# Patient Record
Sex: MALE | Race: WHITE | NOT HISPANIC OR LATINO | URBAN - METROPOLITAN AREA
[De-identification: names, ages, dates, MRNs, and addresses within clinical notes are randomized per-mention and may not be internally consistent; named-entity substitution may affect disease eponyms.]

---

## 2017-07-06 ENCOUNTER — INPATIENT (INPATIENT)
Facility: HOSPITAL | Age: 67
LOS: 1 days | Discharge: HOME | End: 2017-07-08
Attending: HOSPITALIST

## 2017-07-06 DIAGNOSIS — R56.9 UNSPECIFIED CONVULSIONS: ICD-10-CM

## 2017-07-11 DIAGNOSIS — F17.210 NICOTINE DEPENDENCE, CIGARETTES, UNCOMPLICATED: ICD-10-CM

## 2017-07-11 DIAGNOSIS — N40.0 BENIGN PROSTATIC HYPERPLASIA WITHOUT LOWER URINARY TRACT SYMPTOMS: ICD-10-CM

## 2017-07-11 DIAGNOSIS — G47.33 OBSTRUCTIVE SLEEP APNEA (ADULT) (PEDIATRIC): ICD-10-CM

## 2017-07-11 DIAGNOSIS — I10 ESSENTIAL (PRIMARY) HYPERTENSION: ICD-10-CM

## 2017-07-11 DIAGNOSIS — E11.9 TYPE 2 DIABETES MELLITUS WITHOUT COMPLICATIONS: ICD-10-CM

## 2017-07-11 DIAGNOSIS — Z86.73 PERSONAL HISTORY OF TRANSIENT ISCHEMIC ATTACK (TIA), AND CEREBRAL INFARCTION WITHOUT RESIDUAL DEFICITS: ICD-10-CM

## 2017-07-11 DIAGNOSIS — G40.909 EPILEPSY, UNSPECIFIED, NOT INTRACTABLE, WITHOUT STATUS EPILEPTICUS: ICD-10-CM

## 2017-07-11 DIAGNOSIS — Z79.84 LONG TERM (CURRENT) USE OF ORAL HYPOGLYCEMIC DRUGS: ICD-10-CM

## 2017-07-11 DIAGNOSIS — I47.1 SUPRAVENTRICULAR TACHYCARDIA: ICD-10-CM

## 2017-07-11 DIAGNOSIS — Z90.49 ACQUIRED ABSENCE OF OTHER SPECIFIED PARTS OF DIGESTIVE TRACT: ICD-10-CM

## 2019-01-22 NOTE — ASU PATIENT PROFILE, ADULT - PMH
Anxiety    Cerebrovascular accident (CVA), unspecified mechanism    Hypertension, unspecified type    Neuropathy    Nonintractable epilepsy without status epilepticus, unspecified epilepsy type    Type 2 diabetes mellitus with complication, without long-term current use of insulin

## 2019-01-23 ENCOUNTER — OUTPATIENT (OUTPATIENT)
Dept: OUTPATIENT SERVICES | Facility: HOSPITAL | Age: 69
LOS: 1 days | Discharge: HOME | End: 2019-01-23

## 2019-01-23 VITALS
OXYGEN SATURATION: 97 % | SYSTOLIC BLOOD PRESSURE: 174 MMHG | DIASTOLIC BLOOD PRESSURE: 87 MMHG | HEIGHT: 66 IN | WEIGHT: 242.95 LBS | TEMPERATURE: 96 F | RESPIRATION RATE: 17 BRPM | HEART RATE: 52 BPM

## 2019-01-23 VITALS — HEART RATE: 65 BPM | RESPIRATION RATE: 18 BRPM | DIASTOLIC BLOOD PRESSURE: 86 MMHG | SYSTOLIC BLOOD PRESSURE: 162 MMHG

## 2019-01-23 DIAGNOSIS — Z98.890 OTHER SPECIFIED POSTPROCEDURAL STATES: Chronic | ICD-10-CM

## 2019-01-23 LAB — GLUCOSE BLDC GLUCOMTR-MCNC: 122 MG/DL — HIGH (ref 70–99)

## 2019-01-23 NOTE — ASU PREOP CHECKLIST - ANTIBIOTIC
After Visit Summary   8/8/2018    Iris Lewis    MRN: [id number]           Patient Information     Date Of Birth          1940        Visit Information        Provider Department      8/8/2018 10:30 AM  24 ATC;  ONCOLOGY INFUSION [facility]        Today's Diagnoses     Malignant neoplasm of head of pancreas (H)    -  1      Care Instructions    Contact Numbers  [facility]: [phone]    After Hours:  [phone]  Triage: [phone]     Please call the [facility] Triage line if you experience a temperature greater than or equal to 100.5, shaking chills, have uncontrolled nausea, vomiting and/or diarrhea, dizziness, shortness of breath, chest pain, bleeding, unexplained bruising, or if you have any other new/concerning symptoms, questions or concerns.      If it is after hours, weekends, or holidays, please call the [phone] to speak to a nurse.      If you are having any concerning symptoms or wish to speak to a provider before your next infusion visit, please call your care coordinator or triage to notify them so we can adequately serve you.      If you need a refill on a narcotic prescription or other medication, please call triage before your infusion appointment.               August 2018 Sunday Monday Tuesday Wednesday Thursday Friday Saturday                  1     2     3     4       5     6     [facility] LAB DRAW    8:30 AM   (15 min.)   [facility] DRAW   [facility] Lab Draw     [facility] ONC INFUSION 180    9:00 AM   (180 min.)    ONCOLOGY INFUSION   [facility] 7     8     [facility] RETURN    9:15 AM   (30 min.)   José Antonio Ann MD   [facility] ONC INFUSION 60   10:30 AM   (60 min.)    ONCOLOGY INFUSION   [facility] 9     10     11       12     13     14     15     16     17     18       19     20     [facility] DRAW   10:00 AM   (15 min.)   [facility] LAB  DRAW   [facility] Lab Draw     P ONC INFUSION 180   10:30 AM   (180 min.)   UC ONCOLOGY INFUSION   [facility] 21     22     23     24     CT CHEST ABDOMEN PELVIS WWO    9:40 AM   (20 min.)   UCCT2   [facility] CT 25       26     27     [facility] DRAW   10:15 AM   (15 min.)   [facility] DRAW   [facility] Lab Draw     [facility] RETURN   10:30 AM   (30 min.)   Vinny Yu MD   [facility]     [facility] ONC INFUSION 180   11:30 AM   (180 min.)   UC ONCOLOGY INFUSION   [facility] 28     29     30     31 September 2018 Sunday Monday Tuesday Wednesday Thursday Friday Saturday                                 1       2     3     4     5     6     7     8       9     10     11     12     13     14     15       16     17     18     19     20     21     22       23     24     25     26     27     28     29       30                                                Lab Results:  No results found for this or any previous visit (from the past 12 hour(s)).            Follow-ups after your visit        Your next 10 appointments already scheduled     Aug 20, 2018 10:00 AM CDT   [facility] Lab Draw with  [facility] DRAW   [facility] ([facility]    [address]  [address]  [place] [address] [phone]            Aug 20, 2018 10:30 AM CDT   Infusion 180 with UC ONCOLOGY INFUSION, UC 22 ATC   [facility] ([facility])    [address]  [address]  [place] [address] [phone]            Aug 24, 2018  9:40 AM CDT   CT CHEST ABDOMEN PELVIS W/O & W CONTRAST with UCCT2   [facility] CT ([facility])    [address]  1st Floor  [place] [address] [phone]           Please bring any scans or X-rays taken at other hospitals, if similar tests were done.  Also bring a list of your medicines, including vitamins, minerals and over-the-counter drugs. It is safest to leave personal items at home.  Be sure to tell your doctor:   If you have any allergies.   If there s any chance you are pregnant.   If you are breastfeeding.  How to prepare:   Do not eat or drink for 2 hours before your exam. If you need to take medicine, you may take it with small sips of water. (We may ask you to take liquid medicine as well.)   Please wear loose clothing, such as a sweat suit or jogging clothes. Avoid snaps, zippers and other metal. We may ask you to undress and put on a hospital gown.  Please arrive 30 minutes early for your CT. Once in the department you might be asked to drink water 15-20 minutes prior to your exam.  If indicated you may be asked to drink an oral contrast in advance of your CT.  If this is the case, the imaging team will let you know or be in contact with you prior to your appointment  Patients over 70 or patients with diabetes or kidney problems:   If you haven t had a blood test (creatinine test) within the last 30 days, the Cardiologist/Radiologist may require you to get this test prior to your exam.  If you have diabetes:   Continue to take your metformin medication on the day of your exam  If you have any questions, please call the Imaging Department where you will have your exam.            Aug 27, 2018 10:15 AM CDT   [facility] Lab Draw with [facility] LAB DRAW   [facility] Lab Draw ([facility])    [address]  [address]  [place] [address]   [phone]            Aug 27, 2018 10:45 AM CDT   (Arrive by 10:30 AM)   Return Visit with Vinny Yu MD   [facility] ([facility])    [address]  [address]  [place] [address]   [phone]            Aug 27, 2018 11:30 AM CDT   Infusion 180 with  ONCOLOGY INFUSION,  31 ATC   [facility]  Clinic ([facility])    [address]  [address]  [place] [address] [phone]              Who to contact     If you have questions or need follow up information about today's clinic visit or your schedule please contact [facility] directly at [phone].  Normal or non-critical lab and imaging results will be communicated to you by MyChart, letter or phone within 4 business days after the clinic has received the results. If you do not hear from us within 7 days, please contact the clinic through [employer] or phone. If you have a critical or abnormal lab result, we will notify you by phone as soon as possible.  Submit refill requests through [employer] or call your pharmacy and they will forward the refill request to us. Please allow 3 business days for your refill to be completed.          Additional Information About Your Visit        [employer] Information     [employer] gives you secure access to your electronic health record. If you see a primary care provider, you can also send messages to your care team and make appointments. If you have questions, please call your primary care clinic.  If you do not have a primary care provider, please call [phone] and they will assist you.        Care EveryWhere ID     This is your Care EveryWhere ID. This could be used by other organizations to access your [place] medical records  [id number]         Blood Pressure from Last 3 Encounters:   08/08/18 118/68   08/06/18 121/74   07/26/18 120/62    Weight from Last 3 Encounters:   08/08/18 45.9 kg (101 lb 3 oz)   08/06/18 44.3 kg (97 lb 9.6 oz)   07/24/18 43.6 kg (96 lb 1.6 oz)              Today, you had the following     No orders found for display       Primary Care Provider Office Phone # Fax #    Neri Gipson [phone]       [address] [address]        Equal Access to Services     NESSA NICHOLS AH: Shaun simms  madison Martinez, waronnyda luqadaha, qaybta kaalerickson correia, lisa shresthasamira fernanda. So [facility] [phone].    ATENCIÓN: Si habla español, tiene a nava disposición servicios gratuitos de asistencia lingüística. Indu al [phone].    We comply with applicable federal civil rights laws and [place] laws. We do not discriminate on the basis of race, color, national origin, age, disability, sex, sexual orientation, or gender identity.            Thank you!     Thank you for choosing [facility] CLINIC  for your care. Our goal is always to provide you with excellent care. Hearing back from our patients is one way we can continue to improve our services. Please take a few minutes to complete the written survey that you may receive in the mail after your visit with us. Thank you!             Your Updated Medication List - Protect others around you: Learn how to safely use, store and throw away your medicines at www.disposemymeds.org.          This list is accurate as of 8/8/18  4:26 PM.  Always use your most recent med list.                   Brand Name Dispense Instructions for use Diagnosis    albuterol 108 (90 Base) MCG/ACT Inhaler    PROAIR HFA/PROVENTIL HFA/VENTOLIN HFA    1 Inhaler    Inhale 2 puffs into the lungs 4 times daily    Mild asthma, unspecified whether complicated, unspecified whether persistent       amylase-lipase-protease 29234 units Cpep    CREON    180 capsule    Take 2 capsules (72,000 Units) by mouth 3 times daily (with meals)    Malignant neoplasm of head of pancreas (H)       BREO ELLIPTA 100-25 MCG/INH oral inhaler   Generic drug:  fluticasone-vilanterol           CALCIUM PO      Take by mouth every morning Form/strength unknown. Powder formulation. Add to water and fruits/vegetables daily to promote bone health.        CARTIA  MG 24 hr capsule   Generic drug:  diltiazem     10 capsule    TK 1 C PO QD    Benign essential hypertension       cholecalciferol  1000 UNIT tablet    vitamin D3     Take 1 tablet by mouth 2 times daily        cyanocolbalamin 500 MCG tablet    vitamin  B-12     Take 500 mcg by mouth every morning        folic acid 400 MCG tablet    FOLVITE     Take 1 tablet by mouth every morning        hydrOXYzine 25 MG tablet    ATARAX    60 tablet    Take 1-2 tablets (25-50 mg) by mouth every 6 hours as needed for itching (adjuvant pain)    Obstructive jaundice       levothyroxine 125 MCG tablet    SYNTHROID/LEVOTHROID    90 tablet    Take 1 tablet (125 mcg) by mouth daily    Hypothyroidism, unspecified type       loperamide 2 MG capsule    IMODIUM    60 capsule    2 caps at 1st sign of diarrhea & 1 cap every 2hrs until 12hrs diarrhea free. During night, 2 caps at bedtime & 2 caps every 4hrs until AM    Malignant neoplasm of head of pancreas (H)       LORazepam 0.5 MG tablet    ATIVAN    30 tablet    Take 1 tablet (0.5 mg) by mouth every 4 hours as needed (Anxiety, Nausea/Vomiting or Sleep)    Malignant neoplasm of head of pancreas (H)       magic mouthwash suspension (diphenhydrAMINE, lidocaine, aluminum-magnesium & simethicone) Susp compounding kit     237 mL    Swish and swallow 5-10 mLs in mouth every 6 hours as needed for mouth sores    Malignant neoplasm of head of pancreas (H)       nystatin 125571 UNIT/ML suspension    MYCOSTATIN    473 mL    Take 5 mLs (500,000 Units) by mouth 4 times daily    Malignant neoplasm of head of pancreas (H), History of pulmonary embolism       omeprazole 40 MG capsule    priLOSEC          * ondansetron 4 MG tablet    ZOFRAN          * ondansetron 8 MG tablet    ZOFRAN    30 tablet    Take 1 tablet (8 mg) by mouth every 8 hours as needed (nausea/vomiting)    Malignant neoplasm of head of pancreas (H)       order for DME     2 Units    Equipment being ordered: Compression Stockings. Please dispense 2 pairs of knee high 20-30 mmHg    Lymphedema       pantoprazole 20 MG EC tablet    PROTONIX    30 tablet    Take 1 tablet (20  mg) by mouth daily    Other acute gastritis without hemorrhage       * prochlorperazine 5 MG tablet    COMPAZINE    30 tablet    Take 1 tablet (5 mg) by mouth every 6 hours as needed for nausea or vomiting    Malignant neoplasm of head of pancreas (H)       * prochlorperazine 10 MG tablet    COMPAZINE    30 tablet    Take 1 tablet (10 mg) by mouth every 6 hours as needed for nausea or vomiting    Malignant neoplasm of head of pancreas (H)       rivaroxaban ANTICOAGULANT 20 MG Tabs tablet    XARELTO    30 tablet    Take 1 tablet (20 mg) by mouth daily (with dinner)    Malignant neoplasm of head of pancreas (H), History of pulmonary embolism       * timolol maleate 0.5 % opthalmic solution    ISTALOL    1 Bottle    Place 1 drop into both eyes every morning Before placing contact lenses    Cataract, unspecified cataract type, unspecified laterality       * timolol 0.5 % ophthalmic solution    TIMOPTIC     INT 1 GTT OU IN THE MORNING        traMADol 50 MG tablet    ULTRAM    60 tablet    Take 1-2 tablets ( mg) by mouth every 4 hours as needed for breakthrough pain Maximum of 8 tablets daily.    Malignant neoplasm of head of pancreas (H)       TYLENOL PO      Take 325 mg by mouth every 4 hours as needed for mild pain or fever        * Notice:  This list has 6 medication(s) that are the same as other medications prescribed for you. Read the directions carefully, and ask your doctor or other care provider to review them with you.       n/a

## 2019-02-01 DIAGNOSIS — Z86.73 PERSONAL HISTORY OF TRANSIENT ISCHEMIC ATTACK (TIA), AND CEREBRAL INFARCTION WITHOUT RESIDUAL DEFICITS: ICD-10-CM

## 2019-02-01 DIAGNOSIS — H25.11 AGE-RELATED NUCLEAR CATARACT, RIGHT EYE: ICD-10-CM

## 2019-02-01 DIAGNOSIS — E11.40 TYPE 2 DIABETES MELLITUS WITH DIABETIC NEUROPATHY, UNSPECIFIED: ICD-10-CM

## 2019-02-01 DIAGNOSIS — F41.9 ANXIETY DISORDER, UNSPECIFIED: ICD-10-CM

## 2019-02-01 DIAGNOSIS — I10 ESSENTIAL (PRIMARY) HYPERTENSION: ICD-10-CM

## 2019-02-07 PROBLEM — F41.9 ANXIETY DISORDER, UNSPECIFIED: Chronic | Status: ACTIVE | Noted: 2019-01-23

## 2019-02-07 PROBLEM — I10 ESSENTIAL (PRIMARY) HYPERTENSION: Chronic | Status: ACTIVE | Noted: 2019-01-23

## 2019-02-07 PROBLEM — E11.8 TYPE 2 DIABETES MELLITUS WITH UNSPECIFIED COMPLICATIONS: Chronic | Status: ACTIVE | Noted: 2019-01-23

## 2019-02-07 PROBLEM — G62.9 POLYNEUROPATHY, UNSPECIFIED: Chronic | Status: ACTIVE | Noted: 2019-01-23

## 2019-02-20 ENCOUNTER — OUTPATIENT (OUTPATIENT)
Dept: OUTPATIENT SERVICES | Facility: HOSPITAL | Age: 69
LOS: 1 days | Discharge: HOME | End: 2019-02-20

## 2019-02-20 VITALS
DIASTOLIC BLOOD PRESSURE: 88 MMHG | OXYGEN SATURATION: 98 % | HEIGHT: 66 IN | RESPIRATION RATE: 16 BRPM | HEART RATE: 57 BPM | SYSTOLIC BLOOD PRESSURE: 160 MMHG | WEIGHT: 242.07 LBS | TEMPERATURE: 96 F

## 2019-02-20 VITALS — DIASTOLIC BLOOD PRESSURE: 93 MMHG | SYSTOLIC BLOOD PRESSURE: 183 MMHG

## 2019-02-20 DIAGNOSIS — H26.9 UNSPECIFIED CATARACT: Chronic | ICD-10-CM

## 2019-02-20 DIAGNOSIS — Z98.890 OTHER SPECIFIED POSTPROCEDURAL STATES: Chronic | ICD-10-CM

## 2019-02-20 LAB — GLUCOSE BLDC GLUCOMTR-MCNC: 128 MG/DL — HIGH (ref 70–99)

## 2019-03-05 DIAGNOSIS — G47.33 OBSTRUCTIVE SLEEP APNEA (ADULT) (PEDIATRIC): ICD-10-CM

## 2019-03-05 DIAGNOSIS — E11.9 TYPE 2 DIABETES MELLITUS WITHOUT COMPLICATIONS: ICD-10-CM

## 2019-03-05 DIAGNOSIS — Z79.84 LONG TERM (CURRENT) USE OF ORAL HYPOGLYCEMIC DRUGS: ICD-10-CM

## 2019-03-05 DIAGNOSIS — I10 ESSENTIAL (PRIMARY) HYPERTENSION: ICD-10-CM

## 2019-03-05 DIAGNOSIS — Z79.02 LONG TERM (CURRENT) USE OF ANTITHROMBOTICS/ANTIPLATELETS: ICD-10-CM

## 2019-03-05 DIAGNOSIS — Z79.82 LONG TERM (CURRENT) USE OF ASPIRIN: ICD-10-CM

## 2019-03-05 DIAGNOSIS — H25.12 AGE-RELATED NUCLEAR CATARACT, LEFT EYE: ICD-10-CM

## 2019-05-14 PROBLEM — Z00.00 ENCOUNTER FOR PREVENTIVE HEALTH EXAMINATION: Status: ACTIVE | Noted: 2019-05-14

## 2019-07-22 ENCOUNTER — APPOINTMENT (OUTPATIENT)
Dept: NEUROLOGY | Facility: CLINIC | Age: 69
End: 2019-07-22
Payer: MEDICARE

## 2019-07-22 VITALS
SYSTOLIC BLOOD PRESSURE: 140 MMHG | DIASTOLIC BLOOD PRESSURE: 70 MMHG | HEIGHT: 66 IN | TEMPERATURE: 96.3 F | OXYGEN SATURATION: 96 % | HEART RATE: 79 BPM | BODY MASS INDEX: 38.57 KG/M2 | WEIGHT: 240 LBS

## 2019-07-22 PROCEDURE — 99214 OFFICE O/P EST MOD 30 MIN: CPT

## 2019-07-22 NOTE — HISTORY OF PRESENT ILLNESS
[FreeTextEntry1] : Ric is here for followup he categorically denies having had any episodes of seizure however he says that things are not good. He says not having a job created a condition that is financially causing a lot of stress. He says he feels more depressed unfortunately he is not motivated to make changes as well. He is not using his CPAP and that also does not help with his level of energy and motivation.\par He denies having headache he saw his primary physician last month he did not see a cardiologist he did not loose weight and when he speaks in long sentences he appears to slightly short of breath.\par He did not have all but occasionally has difficulty walking on unpaid surface.\par He denies having significant neck pain or back pain.

## 2019-07-22 NOTE — PHYSICAL EXAM
[FreeTextEntry1] : Ric appears sleepy and slightly tired well-groomed. He is a slightly hard of hearing and occasionally slow in processing.\par He is able to follow for a step commands he crosses the midline well. Cranial nerve exam is normal motor exam shows no drift. The range of motion for the neck is normal Romberg is negative his gait is a stable however slightly hesitant and clumsy.

## 2019-07-22 NOTE — ASSESSMENT
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 He does also have history of obstructive sleep apnea hypertension and dyslipidemia. He is suffering from depression as well.\par We had discussed issues related to his mood and obstructive sleep apnea and their interaction and the impact on his seizure disorder. I have convinced him as he leaves off his toes go back to pulmonary and make an appointment for discussing his difficulties with using CPAP machine. I also gave him a referral for [facility] mental health I will see him in 4 months feet levels.

## 2019-11-05 ENCOUNTER — APPOINTMENT (OUTPATIENT)
Dept: NEUROLOGY | Facility: CLINIC | Age: 69
End: 2019-11-05
Payer: MEDICARE

## 2019-11-05 VITALS
BODY MASS INDEX: 38.57 KG/M2 | DIASTOLIC BLOOD PRESSURE: 80 MMHG | TEMPERATURE: 98.8 F | HEART RATE: 60 BPM | WEIGHT: 240 LBS | OXYGEN SATURATION: 99 % | HEIGHT: 66 IN | SYSTOLIC BLOOD PRESSURE: 138 MMHG

## 2019-11-05 PROCEDURE — 99214 OFFICE O/P EST MOD 30 MIN: CPT

## 2019-11-05 NOTE — HISTORY OF PRESENT ILLNESS
[FreeTextEntry1] : Ric is here for followup. He is doing well in regard to his seizures however he did have one event that made him slightly upset. According to him couple of weeks ago he bent to [place] on a [place]. He did a lot of walking to take pictures then went to the bathroom on his out he thought he can reach to the meeting point with the access a ride from different direction and doing that he had to walk a lot and to some extent he got lost. He clearly remembers every portion of the has no amnesia and has no reports of being confused. Initially he was helped by a nice lady and got back home with his access a ride.\par He says during this walk he felt significantly weak in his back and also his legs it was not painful. From there he saw his primary and a PT x-ray of the cervical and lumbar spine that shows the degenerative disease. We had established that in the past that he does have significant spine disease mainly mechanical in the form of a scoliosis and also stenoses and I believe the presentation of this event was also secondary to stenoses.\par He lost some weight he tries to keep himself busy clearly he does continue to have his a sleep disorder. Fortunately in regard to her seizure is doing better he also reports having a tendency to tilt to the left when he walks that again in my opinion is mechanical and related to the spine.

## 2019-11-05 NOTE — PHYSICAL EXAM
Telephone Encounter by Shannan Smith RN at 11/07/17 08:43 AM     Author:  Shannan Smith RN Service:  (none) Author Type:  Registered Nurse     Filed:  11/07/17 08:47 AM Encounter Date:  11/6/2017 Status:  Signed     :  Shannan Smith RN (Registered Nurse)            Called and advised patient of Dr. CONG Cruz message and recommendations. Patient states he already has appointment scheduled with Urology. Will place order. Cancelled order for general surgery per Dr. CONG Cruz.[AM1.1M]     Electronically Signed by:    Shannan Smith RN , 11/7/2017[AM1.2T]        Revision History        User Key Date/Time User Provider Type Action    > AM1.2 11/07/17 08:47 AM Shannan Smith RN Registered Nurse Sign     AM1.1 11/07/17 08:43 AM Shannan Smith, RN Registered Nurse     M - Manual, T - Template             [FreeTextEntry1] : Alert and awake oriented x3 follows commands crosses the midline well.\par He does appear slightly sleepy and tired but to some extent and relatively better compared to the past. He isn't neglectful\par His cranial nerve exam is normal. The saccadic eye movement to the left is fragmented.\par There is no drift when he walks he does have a slight limp on the right side and drops his shoulder on the left.\par His gait is a stable Romberg is negative\par The carotid exam shows no bruit\par The Romberg is negative

## 2020-01-29 ENCOUNTER — RX RENEWAL (OUTPATIENT)
Age: 70
End: 2020-01-29

## 2020-10-20 ENCOUNTER — APPOINTMENT (OUTPATIENT)
Dept: NEUROLOGY | Facility: CLINIC | Age: 70
End: 2020-10-20
Payer: MEDICARE

## 2020-10-20 VITALS
HEIGHT: 66 IN | HEART RATE: 62 BPM | TEMPERATURE: 97.9 F | SYSTOLIC BLOOD PRESSURE: 145 MMHG | DIASTOLIC BLOOD PRESSURE: 84 MMHG | OXYGEN SATURATION: 94 % | BODY MASS INDEX: 39.05 KG/M2 | WEIGHT: 243 LBS

## 2020-10-20 PROCEDURE — 99072 ADDL SUPL MATRL&STAF TM PHE: CPT

## 2020-10-20 PROCEDURE — 99214 OFFICE O/P EST MOD 30 MIN: CPT

## 2020-10-21 NOTE — PROCEDURE
[FreeTextEntry1] : left hemispheric symptomatic partial epilepsy secondary to old stroke\par \par ADAIR\par R/O spinal stenosis\par \par Plan\par continue medication\par levels\par F/U\par MRI of the l-spine and consider T-Spine\par

## 2020-10-21 NOTE — PHYSICAL EXAM
[FreeTextEntry1] : A/A/Ox3\par less tired and sleepy compared to some other prior visits\par No significant psychomotor slowing\par good attention\par walks in with a cane with slight stooped /scoliotic posture\par able to walk without the cane but hesitant and slow.\par 3/3 recall\par good executive behavior\par no drift\par no dysmetria\par Decreased DTR UE\par present ankle and knee 2/4

## 2020-10-21 NOTE — HISTORY OF PRESENT ILLNESS
[FreeTextEntry1] : Jae is here for the F/U. He is very much at the baseline except one  event  that happened yesterday. He rode on a bus  . came off and tried to walk a block to his home. felt weak in the legs to the point that could not walk and asked people to help him . He says he had no strength in the legs and at the same time was having pain in the lower back. No particular radiating pain to the legs.No change in the MS. No weakness in the UE.\par He says occasionally he has this pattern of symptoms price milder .He does have difficulty emptying his bladder in the morning has to try couple of times\par He gained few pounds.\par He now wears his C-Pap regularly and believes it is helping him significantly.\par No seizures.No events suggestive of seizure\par

## 2020-10-28 ENCOUNTER — APPOINTMENT (OUTPATIENT)
Dept: UROLOGY | Facility: CLINIC | Age: 70
End: 2020-10-28
Payer: MEDICARE

## 2020-10-28 DIAGNOSIS — Z78.9 OTHER SPECIFIED HEALTH STATUS: ICD-10-CM

## 2020-10-28 PROCEDURE — 99072 ADDL SUPL MATRL&STAF TM PHE: CPT

## 2020-10-28 PROCEDURE — 99205 OFFICE O/P NEW HI 60 MIN: CPT

## 2020-12-02 ENCOUNTER — APPOINTMENT (OUTPATIENT)
Dept: UROLOGY | Facility: CLINIC | Age: 70
End: 2020-12-02
Payer: MEDICARE

## 2020-12-02 PROCEDURE — 51798 US URINE CAPACITY MEASURE: CPT

## 2020-12-02 PROCEDURE — 99213 OFFICE O/P EST LOW 20 MIN: CPT

## 2020-12-02 PROCEDURE — 99072 ADDL SUPL MATRL&STAF TM PHE: CPT

## 2021-01-05 ENCOUNTER — APPOINTMENT (OUTPATIENT)
Dept: UROLOGY | Facility: CLINIC | Age: 71
End: 2021-01-05
Payer: MEDICARE

## 2021-01-05 VITALS — WEIGHT: 243 LBS | TEMPERATURE: 97.6 F | HEIGHT: 66 IN | BODY MASS INDEX: 39.05 KG/M2

## 2021-01-05 PROCEDURE — 99213 OFFICE O/P EST LOW 20 MIN: CPT

## 2021-01-05 PROCEDURE — 51798 US URINE CAPACITY MEASURE: CPT

## 2021-01-05 PROCEDURE — 99072 ADDL SUPL MATRL&STAF TM PHE: CPT

## 2021-01-08 ENCOUNTER — RX RENEWAL (OUTPATIENT)
Age: 71
End: 2021-01-08

## 2021-01-20 ENCOUNTER — RX RENEWAL (OUTPATIENT)
Age: 71
End: 2021-01-20

## 2021-02-12 ENCOUNTER — APPOINTMENT (OUTPATIENT)
Dept: UROLOGY | Facility: CLINIC | Age: 71
End: 2021-02-12
Payer: SELF-PAY

## 2021-02-12 VITALS — HEIGHT: 66 IN | BODY MASS INDEX: 39.05 KG/M2 | TEMPERATURE: 97.2 F | WEIGHT: 243 LBS

## 2021-02-12 PROCEDURE — 99072 ADDL SUPL MATRL&STAF TM PHE: CPT

## 2021-02-12 PROCEDURE — 51741 ELECTRO-UROFLOWMETRY FIRST: CPT

## 2021-02-12 PROCEDURE — 51784 ANAL/URINARY MUSCLE STUDY: CPT

## 2021-02-12 PROCEDURE — 51728 CYSTOMETROGRAM W/VP: CPT

## 2021-02-24 ENCOUNTER — RX RENEWAL (OUTPATIENT)
Age: 71
End: 2021-02-24

## 2021-04-02 ENCOUNTER — RX RENEWAL (OUTPATIENT)
Age: 71
End: 2021-04-02

## 2021-04-02 ENCOUNTER — APPOINTMENT (OUTPATIENT)
Dept: UROLOGY | Facility: CLINIC | Age: 71
End: 2021-04-02
Payer: MEDICARE

## 2021-04-02 VITALS — HEIGHT: 66 IN | BODY MASS INDEX: 39.37 KG/M2 | WEIGHT: 245 LBS | TEMPERATURE: 97.8 F

## 2021-04-02 PROCEDURE — 99213 OFFICE O/P EST LOW 20 MIN: CPT

## 2021-04-02 PROCEDURE — 99072 ADDL SUPL MATRL&STAF TM PHE: CPT

## 2021-04-12 ENCOUNTER — APPOINTMENT (OUTPATIENT)
Dept: NEUROLOGY | Facility: CLINIC | Age: 71
End: 2021-04-12
Payer: MEDICARE

## 2021-04-12 VITALS
SYSTOLIC BLOOD PRESSURE: 140 MMHG | WEIGHT: 250 LBS | HEIGHT: 66 IN | TEMPERATURE: 97.8 F | HEART RATE: 89 BPM | BODY MASS INDEX: 40.18 KG/M2 | DIASTOLIC BLOOD PRESSURE: 80 MMHG | OXYGEN SATURATION: 97 %

## 2021-04-12 PROCEDURE — 99072 ADDL SUPL MATRL&STAF TM PHE: CPT

## 2021-04-12 PROCEDURE — 99214 OFFICE O/P EST MOD 30 MIN: CPT

## 2021-04-12 NOTE — ASSESSMENT
[FreeTextEntry1] : Left MCA stroke\par Left partial epilepsy\par ADAIR\par DM\par DLD\par Gait disorder   --mainly mechanical\par \par Plan\par weight loss\par PT\par Hydrotherapy\par Asked him to ask his son to call me. Hopefully plans could be reinforced by them

## 2021-04-12 NOTE — HISTORY OF PRESENT ILLNESS
[FreeTextEntry1] : Ric is here for the F/U.\par He is doing well with the seizures. continues to use the cane and mainly complaining of his gait and fear of falling.\par Fortunately did not have a fall..\par He is using the C-PAP to the best that he can.\par He is not specifically complaining of back or cervical pain.\par No significant sensory symptoms like dysesthesia, numbness or tingling in his feet\par He feels much more safe by using the cane\par He gained about 5 pounds\par He is really very much stationary and watching TV  . Not exercising.\par He does have significant urinary issues . His last PSA is normal. His urologist recently saw him.\par He continues with his keppra and TLP\par His last blood test showed high cholesterol\par He did get his L-Spine MRI that shows multilevel disease with scolisis\par \par \par

## 2021-04-12 NOTE — PHYSICAL EXAM
[FreeTextEntry1] : A/A/Ox 3\par follows 4 step commands.\par slightly tired /sleepy\par good attention and normal language\par good range of motion of the neck\par no drift\par + scoliosis\par limps on the left\par stable gait

## 2021-05-25 ENCOUNTER — APPOINTMENT (OUTPATIENT)
Dept: UROLOGY | Facility: CLINIC | Age: 71
End: 2021-05-25

## 2021-05-26 ENCOUNTER — RX RENEWAL (OUTPATIENT)
Age: 71
End: 2021-05-26

## 2021-06-09 ENCOUNTER — RX RENEWAL (OUTPATIENT)
Age: 71
End: 2021-06-09

## 2021-06-28 ENCOUNTER — RX RENEWAL (OUTPATIENT)
Age: 71
End: 2021-06-28

## 2021-08-25 ENCOUNTER — RX RENEWAL (OUTPATIENT)
Age: 71
End: 2021-08-25

## 2021-08-25 ENCOUNTER — APPOINTMENT (OUTPATIENT)
Dept: UROLOGY | Facility: CLINIC | Age: 71
End: 2021-08-25
Payer: MEDICARE

## 2021-08-25 VITALS — HEIGHT: 66 IN | WEIGHT: 240 LBS | BODY MASS INDEX: 38.57 KG/M2

## 2021-08-25 PROCEDURE — 99213 OFFICE O/P EST LOW 20 MIN: CPT

## 2021-09-16 ENCOUNTER — NON-APPOINTMENT (OUTPATIENT)
Age: 71
End: 2021-09-16

## 2021-09-17 ENCOUNTER — NON-APPOINTMENT (OUTPATIENT)
Age: 71
End: 2021-09-17

## 2021-10-01 ENCOUNTER — INPATIENT (INPATIENT)
Facility: HOSPITAL | Age: 71
LOS: 10 days | Discharge: HOME HEALTH | End: 2021-10-12
Attending: INTERNAL MEDICINE | Admitting: INTERNAL MEDICINE
Payer: MEDICARE

## 2021-10-01 VITALS
HEART RATE: 80 BPM | RESPIRATION RATE: 20 BRPM | WEIGHT: 279.99 LBS | TEMPERATURE: 98 F | HEIGHT: 66 IN | SYSTOLIC BLOOD PRESSURE: 215 MMHG | DIASTOLIC BLOOD PRESSURE: 102 MMHG

## 2021-10-01 DIAGNOSIS — R39.15 URGENCY OF URINATION: ICD-10-CM

## 2021-10-01 DIAGNOSIS — R26.9 UNSPECIFIED ABNORMALITIES OF GAIT AND MOBILITY: ICD-10-CM

## 2021-10-01 DIAGNOSIS — H26.9 UNSPECIFIED CATARACT: Chronic | ICD-10-CM

## 2021-10-01 DIAGNOSIS — E11.40 TYPE 2 DIABETES MELLITUS WITH DIABETIC NEUROPATHY, UNSPECIFIED: ICD-10-CM

## 2021-10-01 DIAGNOSIS — E78.5 HYPERLIPIDEMIA, UNSPECIFIED: ICD-10-CM

## 2021-10-01 DIAGNOSIS — N40.1 BENIGN PROSTATIC HYPERPLASIA WITH LOWER URINARY TRACT SYMPTOMS: ICD-10-CM

## 2021-10-01 DIAGNOSIS — G47.33 OBSTRUCTIVE SLEEP APNEA (ADULT) (PEDIATRIC): ICD-10-CM

## 2021-10-01 DIAGNOSIS — M51.16 INTERVERTEBRAL DISC DISORDERS WITH RADICULOPATHY, LUMBAR REGION: ICD-10-CM

## 2021-10-01 DIAGNOSIS — G40.109 LOCALIZATION-RELATED (FOCAL) (PARTIAL) SYMPTOMATIC EPILEPSY AND EPILEPTIC SYNDROMES WITH SIMPLE PARTIAL SEIZURES, NOT INTRACTABLE, WITHOUT STATUS EPILEPTICUS: ICD-10-CM

## 2021-10-01 DIAGNOSIS — I10 ESSENTIAL (PRIMARY) HYPERTENSION: ICD-10-CM

## 2021-10-01 DIAGNOSIS — R32 UNSPECIFIED URINARY INCONTINENCE: ICD-10-CM

## 2021-10-01 DIAGNOSIS — R53.1 WEAKNESS: ICD-10-CM

## 2021-10-01 DIAGNOSIS — Z86.73 PERSONAL HISTORY OF TRANSIENT ISCHEMIC ATTACK (TIA), AND CEREBRAL INFARCTION WITHOUT RESIDUAL DEFICITS: ICD-10-CM

## 2021-10-01 DIAGNOSIS — Z98.890 OTHER SPECIFIED POSTPROCEDURAL STATES: Chronic | ICD-10-CM

## 2021-10-01 DIAGNOSIS — M41.86 OTHER FORMS OF SCOLIOSIS, LUMBAR REGION: ICD-10-CM

## 2021-10-01 PROCEDURE — 99285 EMERGENCY DEPT VISIT HI MDM: CPT

## 2021-10-01 PROCEDURE — 99053 MED SERV 10PM-8AM 24 HR FAC: CPT

## 2021-10-01 NOTE — ED ADULT TRIAGE NOTE - CHIEF COMPLAINT QUOTE
BIBA from home with complaints of weakness and fever but never took his temperature at home. Patient currently vaccinated with Moderna since January.

## 2021-10-02 LAB
ALBUMIN SERPL ELPH-MCNC: 4 G/DL — SIGNIFICANT CHANGE UP (ref 3.5–5.2)
ALP SERPL-CCNC: 65 U/L — SIGNIFICANT CHANGE UP (ref 30–115)
ALT FLD-CCNC: 15 U/L — SIGNIFICANT CHANGE UP (ref 0–41)
ANION GAP SERPL CALC-SCNC: 16 MMOL/L — HIGH (ref 7–14)
APPEARANCE UR: CLEAR — SIGNIFICANT CHANGE UP
AST SERPL-CCNC: 15 U/L — SIGNIFICANT CHANGE UP (ref 0–41)
BACTERIA # UR AUTO: NEGATIVE — SIGNIFICANT CHANGE UP
BASOPHILS # BLD AUTO: 0.04 K/UL — SIGNIFICANT CHANGE UP (ref 0–0.2)
BASOPHILS NFR BLD AUTO: 0.5 % — SIGNIFICANT CHANGE UP (ref 0–1)
BILIRUB SERPL-MCNC: 0.4 MG/DL — SIGNIFICANT CHANGE UP (ref 0.2–1.2)
BILIRUB UR-MCNC: NEGATIVE — SIGNIFICANT CHANGE UP
BUN SERPL-MCNC: 13 MG/DL — SIGNIFICANT CHANGE UP (ref 10–20)
CALCIUM SERPL-MCNC: 8.8 MG/DL — SIGNIFICANT CHANGE UP (ref 8.5–10.1)
CHLORIDE SERPL-SCNC: 101 MMOL/L — SIGNIFICANT CHANGE UP (ref 98–110)
CO2 SERPL-SCNC: 21 MMOL/L — SIGNIFICANT CHANGE UP (ref 17–32)
COLOR SPEC: YELLOW — SIGNIFICANT CHANGE UP
CREAT SERPL-MCNC: 0.9 MG/DL — SIGNIFICANT CHANGE UP (ref 0.7–1.5)
D DIMER BLD IA.RAPID-MCNC: 64 NG/ML DDU — SIGNIFICANT CHANGE UP (ref 0–230)
DIFF PNL FLD: NEGATIVE — SIGNIFICANT CHANGE UP
EOSINOPHIL # BLD AUTO: 0.03 K/UL — SIGNIFICANT CHANGE UP (ref 0–0.7)
EOSINOPHIL NFR BLD AUTO: 0.4 % — SIGNIFICANT CHANGE UP (ref 0–8)
EPI CELLS # UR: 1 /HPF — SIGNIFICANT CHANGE UP (ref 0–5)
GLUCOSE BLDC GLUCOMTR-MCNC: 129 MG/DL — HIGH (ref 70–99)
GLUCOSE BLDC GLUCOMTR-MCNC: 139 MG/DL — HIGH (ref 70–99)
GLUCOSE BLDC GLUCOMTR-MCNC: 178 MG/DL — HIGH (ref 70–99)
GLUCOSE SERPL-MCNC: 213 MG/DL — HIGH (ref 70–99)
GLUCOSE UR QL: ABNORMAL
HCT VFR BLD CALC: 46.6 % — SIGNIFICANT CHANGE UP (ref 42–52)
HGB BLD-MCNC: 15.4 G/DL — SIGNIFICANT CHANGE UP (ref 14–18)
HYALINE CASTS # UR AUTO: 1 /LPF — SIGNIFICANT CHANGE UP (ref 0–7)
IMM GRANULOCYTES NFR BLD AUTO: 0.4 % — HIGH (ref 0.1–0.3)
KETONES UR-MCNC: SIGNIFICANT CHANGE UP
LEUKOCYTE ESTERASE UR-ACNC: NEGATIVE — SIGNIFICANT CHANGE UP
LYMPHOCYTES # BLD AUTO: 1.17 K/UL — LOW (ref 1.2–3.4)
LYMPHOCYTES # BLD AUTO: 13.8 % — LOW (ref 20.5–51.1)
MAGNESIUM SERPL-MCNC: 1.8 MG/DL — SIGNIFICANT CHANGE UP (ref 1.8–2.4)
MCHC RBC-ENTMCNC: 30.3 PG — SIGNIFICANT CHANGE UP (ref 27–31)
MCHC RBC-ENTMCNC: 33 G/DL — SIGNIFICANT CHANGE UP (ref 32–37)
MCV RBC AUTO: 91.6 FL — SIGNIFICANT CHANGE UP (ref 80–94)
MONOCYTES # BLD AUTO: 1.38 K/UL — HIGH (ref 0.1–0.6)
MONOCYTES NFR BLD AUTO: 16.3 % — HIGH (ref 1.7–9.3)
NEUTROPHILS # BLD AUTO: 5.84 K/UL — SIGNIFICANT CHANGE UP (ref 1.4–6.5)
NEUTROPHILS NFR BLD AUTO: 68.6 % — SIGNIFICANT CHANGE UP (ref 42.2–75.2)
NITRITE UR-MCNC: NEGATIVE — SIGNIFICANT CHANGE UP
NRBC # BLD: 0 /100 WBCS — SIGNIFICANT CHANGE UP (ref 0–0)
NT-PROBNP SERPL-SCNC: 1142 PG/ML — HIGH (ref 0–300)
PH UR: 6.5 — SIGNIFICANT CHANGE UP (ref 5–8)
PHOSPHATE SERPL-MCNC: 3 MG/DL — SIGNIFICANT CHANGE UP (ref 2.1–4.9)
PLATELET # BLD AUTO: 164 K/UL — SIGNIFICANT CHANGE UP (ref 130–400)
POTASSIUM SERPL-MCNC: 4 MMOL/L — SIGNIFICANT CHANGE UP (ref 3.5–5)
POTASSIUM SERPL-SCNC: 4 MMOL/L — SIGNIFICANT CHANGE UP (ref 3.5–5)
PROT SERPL-MCNC: 6.8 G/DL — SIGNIFICANT CHANGE UP (ref 6–8)
PROT UR-MCNC: ABNORMAL
RAPID RVP RESULT: SIGNIFICANT CHANGE UP
RBC # BLD: 5.09 M/UL — SIGNIFICANT CHANGE UP (ref 4.7–6.1)
RBC # FLD: 12.5 % — SIGNIFICANT CHANGE UP (ref 11.5–14.5)
RBC CASTS # UR COMP ASSIST: 1 /HPF — SIGNIFICANT CHANGE UP (ref 0–4)
SARS-COV-2 RNA SPEC QL NAA+PROBE: SIGNIFICANT CHANGE UP
SODIUM SERPL-SCNC: 138 MMOL/L — SIGNIFICANT CHANGE UP (ref 135–146)
SP GR SPEC: 1.02 — SIGNIFICANT CHANGE UP (ref 1.01–1.03)
TROPONIN T SERPL-MCNC: 0.02 NG/ML — HIGH
TROPONIN T SERPL-MCNC: 0.02 NG/ML — HIGH
TROPONIN T SERPL-MCNC: 0.03 NG/ML — CRITICAL HIGH
UROBILINOGEN FLD QL: SIGNIFICANT CHANGE UP
WBC # BLD: 8.49 K/UL — SIGNIFICANT CHANGE UP (ref 4.8–10.8)
WBC # FLD AUTO: 8.49 K/UL — SIGNIFICANT CHANGE UP (ref 4.8–10.8)
WBC UR QL: 1 /HPF — SIGNIFICANT CHANGE UP (ref 0–5)

## 2021-10-02 PROCEDURE — 71045 X-RAY EXAM CHEST 1 VIEW: CPT | Mod: 26

## 2021-10-02 PROCEDURE — 70450 CT HEAD/BRAIN W/O DYE: CPT | Mod: 26

## 2021-10-02 PROCEDURE — 93010 ELECTROCARDIOGRAM REPORT: CPT | Mod: 76

## 2021-10-02 PROCEDURE — 99223 1ST HOSP IP/OBS HIGH 75: CPT

## 2021-10-02 RX ORDER — LIDOCAINE HCL 20 MG/ML
10 VIAL (ML) INJECTION ONCE
Refills: 0 | Status: DISCONTINUED | OUTPATIENT
Start: 2021-10-02 | End: 2021-10-12

## 2021-10-02 RX ORDER — LEVETIRACETAM 250 MG/1
1 TABLET, FILM COATED ORAL
Qty: 0 | Refills: 0 | DISCHARGE

## 2021-10-02 RX ORDER — OXCARBAZEPINE 300 MG/1
300 TABLET, FILM COATED ORAL
Refills: 0 | Status: DISCONTINUED | OUTPATIENT
Start: 2021-10-02 | End: 2021-10-12

## 2021-10-02 RX ORDER — ATORVASTATIN CALCIUM 80 MG/1
40 TABLET, FILM COATED ORAL AT BEDTIME
Refills: 0 | Status: DISCONTINUED | OUTPATIENT
Start: 2021-10-02 | End: 2021-10-12

## 2021-10-02 RX ORDER — CLOPIDOGREL BISULFATE 75 MG/1
75 TABLET, FILM COATED ORAL DAILY
Refills: 0 | Status: DISCONTINUED | OUTPATIENT
Start: 2021-10-02 | End: 2021-10-04

## 2021-10-02 RX ORDER — AMLODIPINE BESYLATE 2.5 MG/1
5 TABLET ORAL ONCE
Refills: 0 | Status: COMPLETED | OUTPATIENT
Start: 2021-10-02 | End: 2021-10-02

## 2021-10-02 RX ORDER — LEVETIRACETAM 250 MG/1
0 TABLET, FILM COATED ORAL
Qty: 0 | Refills: 0 | DISCHARGE

## 2021-10-02 RX ORDER — GABAPENTIN 400 MG/1
0 CAPSULE ORAL
Qty: 0 | Refills: 0 | DISCHARGE

## 2021-10-02 RX ORDER — HEPARIN SODIUM 5000 [USP'U]/ML
5000 INJECTION INTRAVENOUS; SUBCUTANEOUS EVERY 8 HOURS
Refills: 0 | Status: DISCONTINUED | OUTPATIENT
Start: 2021-10-02 | End: 2021-10-12

## 2021-10-02 RX ORDER — METFORMIN HYDROCHLORIDE 850 MG/1
1 TABLET ORAL
Qty: 0 | Refills: 0 | DISCHARGE

## 2021-10-02 RX ORDER — GABAPENTIN 400 MG/1
400 CAPSULE ORAL ONCE
Refills: 0 | Status: COMPLETED | OUTPATIENT
Start: 2021-10-02 | End: 2021-10-02

## 2021-10-02 RX ORDER — METOPROLOL TARTRATE 50 MG
50 TABLET ORAL DAILY
Refills: 0 | Status: DISCONTINUED | OUTPATIENT
Start: 2021-10-02 | End: 2021-10-02

## 2021-10-02 RX ORDER — DEXTROSE 50 % IN WATER 50 %
25 SYRINGE (ML) INTRAVENOUS ONCE
Refills: 0 | Status: DISCONTINUED | OUTPATIENT
Start: 2021-10-02 | End: 2021-10-12

## 2021-10-02 RX ORDER — LEVETIRACETAM 250 MG/1
750 TABLET, FILM COATED ORAL
Refills: 0 | Status: DISCONTINUED | OUTPATIENT
Start: 2021-10-02 | End: 2021-10-12

## 2021-10-02 RX ORDER — GLUCAGON INJECTION, SOLUTION 0.5 MG/.1ML
1 INJECTION, SOLUTION SUBCUTANEOUS ONCE
Refills: 0 | Status: DISCONTINUED | OUTPATIENT
Start: 2021-10-02 | End: 2021-10-12

## 2021-10-02 RX ORDER — METOPROLOL TARTRATE 50 MG
0 TABLET ORAL
Qty: 0 | Refills: 0 | DISCHARGE

## 2021-10-02 RX ORDER — DEXTROSE 50 % IN WATER 50 %
15 SYRINGE (ML) INTRAVENOUS ONCE
Refills: 0 | Status: DISCONTINUED | OUTPATIENT
Start: 2021-10-02 | End: 2021-10-12

## 2021-10-02 RX ORDER — LEVETIRACETAM 250 MG/1
750 TABLET, FILM COATED ORAL DAILY
Refills: 0 | Status: DISCONTINUED | OUTPATIENT
Start: 2021-10-02 | End: 2021-10-02

## 2021-10-02 RX ORDER — METOPROLOL TARTRATE 50 MG
50 TABLET ORAL
Refills: 0 | Status: DISCONTINUED | OUTPATIENT
Start: 2021-10-02 | End: 2021-10-12

## 2021-10-02 RX ORDER — SODIUM CHLORIDE 9 MG/ML
1000 INJECTION, SOLUTION INTRAVENOUS
Refills: 0 | Status: DISCONTINUED | OUTPATIENT
Start: 2021-10-02 | End: 2021-10-12

## 2021-10-02 RX ORDER — OXYBUTYNIN CHLORIDE 5 MG
15 TABLET ORAL ONCE
Refills: 0 | Status: COMPLETED | OUTPATIENT
Start: 2021-10-02 | End: 2021-10-02

## 2021-10-02 RX ORDER — INSULIN LISPRO 100/ML
VIAL (ML) SUBCUTANEOUS
Refills: 0 | Status: DISCONTINUED | OUTPATIENT
Start: 2021-10-02 | End: 2021-10-12

## 2021-10-02 RX ORDER — LANOLIN ALCOHOL/MO/W.PET/CERES
5 CREAM (GRAM) TOPICAL AT BEDTIME
Refills: 0 | Status: DISCONTINUED | OUTPATIENT
Start: 2021-10-02 | End: 2021-10-12

## 2021-10-02 RX ORDER — DEXTROSE 50 % IN WATER 50 %
12.5 SYRINGE (ML) INTRAVENOUS ONCE
Refills: 0 | Status: DISCONTINUED | OUTPATIENT
Start: 2021-10-02 | End: 2021-10-12

## 2021-10-02 RX ORDER — ESCITALOPRAM OXALATE 10 MG/1
10 TABLET, FILM COATED ORAL DAILY
Refills: 0 | Status: DISCONTINUED | OUTPATIENT
Start: 2021-10-02 | End: 2021-10-12

## 2021-10-02 RX ORDER — ASPIRIN/CALCIUM CARB/MAGNESIUM 324 MG
81 TABLET ORAL DAILY
Refills: 0 | Status: DISCONTINUED | OUTPATIENT
Start: 2021-10-02 | End: 2021-10-12

## 2021-10-02 RX ADMIN — Medication 81 MILLIGRAM(S): at 15:34

## 2021-10-02 RX ADMIN — CLOPIDOGREL BISULFATE 75 MILLIGRAM(S): 75 TABLET, FILM COATED ORAL at 15:33

## 2021-10-02 RX ADMIN — ESCITALOPRAM OXALATE 10 MILLIGRAM(S): 10 TABLET, FILM COATED ORAL at 15:33

## 2021-10-02 RX ADMIN — ATORVASTATIN CALCIUM 40 MILLIGRAM(S): 80 TABLET, FILM COATED ORAL at 21:57

## 2021-10-02 RX ADMIN — Medication 15 MILLIGRAM(S): at 15:34

## 2021-10-02 RX ADMIN — HEPARIN SODIUM 5000 UNIT(S): 5000 INJECTION INTRAVENOUS; SUBCUTANEOUS at 15:34

## 2021-10-02 RX ADMIN — GABAPENTIN 400 MILLIGRAM(S): 400 CAPSULE ORAL at 15:33

## 2021-10-02 RX ADMIN — Medication 15 MILLIGRAM(S): at 17:31

## 2021-10-02 RX ADMIN — Medication 50 MILLIGRAM(S): at 17:32

## 2021-10-02 RX ADMIN — LEVETIRACETAM 750 MILLIGRAM(S): 250 TABLET, FILM COATED ORAL at 17:31

## 2021-10-02 RX ADMIN — AMLODIPINE BESYLATE 5 MILLIGRAM(S): 2.5 TABLET ORAL at 07:33

## 2021-10-02 RX ADMIN — OXCARBAZEPINE 300 MILLIGRAM(S): 300 TABLET, FILM COATED ORAL at 17:31

## 2021-10-02 RX ADMIN — HEPARIN SODIUM 5000 UNIT(S): 5000 INJECTION INTRAVENOUS; SUBCUTANEOUS at 21:53

## 2021-10-02 RX ADMIN — Medication 20 MILLIGRAM(S): at 17:31

## 2021-10-02 NOTE — H&P ADULT - NSHPLABSRESULTS_GEN_ALL_CORE
15.4   8.49  )-----------( 164      ( 02 Oct 2021 01:09 )             46.6       10-02    138  |  101  |  13  ----------------------------<  213<H>  4.0   |  21  |  0.9    Ca    8.8      02 Oct 2021 01:09  Phos  3.0     10-02  Mg     1.8     10-    TPro  6.8  /  Alb  4.0  /  TBili  0.4  /  DBili  x   /  AST  15  /  ALT  15  /  AlkPhos  65  10-02              Urinalysis Basic - ( 02 Oct 2021 01:09 )    Color: Yellow / Appearance: Clear / S.021 / pH: x  Gluc: x / Ketone: Trace  / Bili: Negative / Urobili: <2 mg/dL   Blood: x / Protein: 100 mg/dL / Nitrite: Negative   Leuk Esterase: Negative / RBC: 1 /HPF / WBC 1 /HPF   Sq Epi: x / Non Sq Epi: 1 /HPF / Bacteria: Negative            Lactate Trend      CARDIAC MARKERS ( 02 Oct 2021 01:09 )  x     / 0.02 ng/mL / x     / x     / x            CAPILLARY BLOOD GLUCOSE

## 2021-10-02 NOTE — H&P ADULT - ATTENDING COMMENTS
Pt with partial memory loss, information obtained from chart review.    71 Y M with PMH of CVA, partial seizures ADAIR, DM, DLD, BPH, gait disorder (mainly mechanical) followed by neuro outpatient presenting to the ED for generalized weakness. Pt states over the past few weeks, he has been unable to "tie his shoelaces and put on his jeans, because it makes him feel SOB. Denies dyspnea when walking up the stairs or walking his dog. States that he feels a lot more wobbly than usual and has fallen multiple times, most recent episode last night at home.  States that he sees the neurologist Dr Bullock o/p. Says he is compliant with all his medications and lives with his son Rosalino. Has a cane for walking but does not use it.     On my interview, pt answers "I don't remember to most questions"    Denies chest pain, orthopnea, PND, SOB on exertion, abdominal pain, weight loss, loss of appetite, LOC, dizziness, syncope, trauma.     #Generalized weakness and gait disorder  more pronounced in lower extremities   reports having diarrhea a week ago   + DTR   s/p multiple falls   f/u PT consult   f/u CTH  f/u orthostatic vitals  would obtain LP if weakness worsening       #Episodic SOB  f/u rpt trops 0.02 on admission, denies chest pain   f/u rpt EKG   no b/l LE edema   f/u echo  f/u BNP         #Hx of CVA   No residual symptoms   cont ASA + Plavix     #Partial seizures  f/u keppra level  cont keppra @ home dose     #HTN   cont enalapril and metoprolol     #DM  f/u A1c in AM   cont SS    #ADAIR  cont CPAP @ night       #DLD  cont atorvastatin    #BPH  #Urge incontinence   cont oxybutinin and oxcarbazepine     *Resume all home medications except contraindicated    *Pt son Cale cna be reached at - [phone]    #DVT PPX : Heparin Suq      FULL CODE

## 2021-10-02 NOTE — H&P ADULT - NSHPPHYSICALEXAM_GEN_ALL_CORE
GENERAL: NAD, speaks in full sentences, no signs of respiratory distress  HEAD:  Atraumatic, Normocephalic  EYES: EOMI, PERRLA, conjunctiva and sclera clear  NECK: Supple, No JVD  CHEST/LUNG: Clear to auscultation bilaterally; No wheeze; No crackles; No accessory muscles used  HEART: Regular rate and rhythm; No murmurs;   ABDOMEN: Soft, Nontender, Nondistended; Bowel sounds present; No guarding  EXTREMITIES:  2+ Peripheral Pulses, No cyanosis or edema  PSYCH: AAOx3  NEUROLOGY: non-focal  SKIN: No rashes or lesions

## 2021-10-02 NOTE — ED PROVIDER NOTE - NS ED ROS FT
Eyes:  No visual changes, eye pain or discharge.  ENMT:  No hearing changes, pain, discharge or infections. No neck pain or stiffness.  Cardiac:  +SOB, no edema. No chest pain with exertion.  Respiratory:  No cough or respiratory distress. No hemoptysis.  GI:  No nausea, vomiting, diarrhea or abdominal pain.  :  No dysuria, frequency or burning.  MS:  No myalgia, muscle weakness, joint pain or back pain.  Neuro:  No headache or weakness.  No LOC.  Skin:  No skin rash.   Endocrine: No history of thyroid disease +diabetes.

## 2021-10-02 NOTE — ED PROVIDER NOTE - ATTENDING CONTRIBUTION TO CARE
70 yo m co gen weakness, fatigue, dec po intake. no cp or sob. no fever or chills. pt is poor historian.   pt in nad, ncat, perrl. ctab, rrr, ab soft, nt, nd. no focal def.    labs, imagign, supportive care   reassess

## 2021-10-02 NOTE — ED PROVIDER NOTE - OBJECTIVE STATEMENT
The patient is a 71 year old male with history of CVA,  DM, DLD, BPH, presents to the ED for generalized weakness. Pt states over the past few weeks, he has been feeling worsening shortness of breath. No fevers/chills, chest pain, nausea, vomiting, abdominal pain.

## 2021-10-02 NOTE — H&P ADULT - ASSESSMENT
Pt with partial memory loss, information obtained from chart review.    71 Y M with PMH of CVA, partial seizures ADAIR, DM, DLD, BPH, gait disorder (mainly mechanical) followed by neuro outpatient presenting to the ED for generalized weakness. Pt states over the past few weeks, he has been unable to "tie his shoelaces and put on his jeans, because it makes him feel SOB. Denies dyspnea when walking up the stairs or walking his dog. States that he feels a lot more wobbly than usual and has fallen multiple times, most recent episode last night at home.  States that he sees the neurologist Dr Bullock o/p. Says he is compliant with all his medications and lives with his son Rosalino.    On my interview, pt answers "I don't remember to most questions"    Denies chest pain, orthopnea, PND, SOB on exertion, abdominal pain, weight loss, loss of appetite, LOC, syncope, trauma.     #Generalized weakness and gait disorder  -s/p multiple falls   -f/u PT consult   -f/u CTH      #Episodic SOB  -f/u rpt trops 0.02 on admission, denies chest pain   -f/u rpt EKG   -no b/l LE edema   -f/u echo  -f/u BNP       #Hx of CVA   No residual symptoms   cont ASA + Plavix     #Partial seizures  -f/u keppra level  -cont keppra @ home dose     #HTN   cont enalapril and metoprolol     #DM  f/u A1c in AM   cont SS    #ADAIR  cont CPAP @ night       #DLD  cont atorvastatin    #BPH  #Urge incontinence   cont oxybutinin     *Resume all home medications except contraindicated    *Unable to reach number on chart, pt does not recall his sons phone number    #DVT PPX : Heparin Suq  #Diet:DASH/TLC  #GI PPX: Not indicated  #Activity:IAT  FULL CODE  Pt with partial memory loss, information obtained from chart review.    71 Y M with PMH of CVA, partial seizures ADAIR, DM, DLD, BPH, gait disorder (mainly mechanical) followed by neuro outpatient presenting to the ED for generalized weakness. Pt states over the past few weeks, he has been unable to "tie his shoelaces and put on his jeans, because it makes him feel SOB. Denies dyspnea when walking up the stairs or walking his dog. States that he feels a lot more wobbly than usual and has fallen multiple times, most recent episode last night at home.  States that he sees the neurologist Dr Bullock o/p. Says he is compliant with all his medications and lives with his son Rosalino. Has a cane for walking but does not use it.     On my interview, pt answers "I don't remember to most questions"    Denies chest pain, orthopnea, PND, SOB on exertion, abdominal pain, weight loss, loss of appetite, LOC, dizziness, syncope, trauma.     #Generalized weakness and gait disorder  -s/p multiple falls   -f/u PT consult   -f/u CTH  -f/u orthostatic vitals      #Episodic SOB  -f/u rpt trops 0.02 on admission, denies chest pain   -f/u rpt EKG   -no b/l LE edema   -f/u echo  -f/u BNP         #Hx of CVA   No residual symptoms   cont ASA + Plavix     #Partial seizures  -f/u keppra level  -cont keppra @ home dose     #HTN   cont enalapril and metoprolol     #DM  f/u A1c in AM   cont SS    #ADAIR  cont CPAP @ night       #DLD  cont atorvastatin    #BPH  #Urge incontinence   cont oxybutinin and oxcarbazepine     *Resume all home medications except contraindicated    *Pt son Cale cna be reached at - [phone]    #DVT PPX : Heparin Suq  #Diet:DASH/TLC  #GI PPX: Not indicated  #Activity:IAT  FULL CODE

## 2021-10-02 NOTE — ED PROVIDER NOTE - CLINICAL SUMMARY MEDICAL DECISION MAKING FREE TEXT BOX
pt with gen weakness.  labs, imaging, ekg, supportive care, reassessment.  slight bump in trop. admit to hosp for further eval.

## 2021-10-02 NOTE — ED PROVIDER NOTE - PHYSICAL EXAMINATION
CONSTITUTIONAL: laying in stretcher; in no acute distress.   SKIN: warm, dry  HEAD: Normocephalic; atraumatic.  EYES: normal sclera and conjunctiva   ENT: No nasal discharge; airway clear.  NECK: Supple; non tender.  CARD: S1, S2 normal; no murmurs, gallops, or rubs. Regular rate and rhythm.   RESP: No wheezes, rales or rhonchi.  ABD: soft ntnd  EXT: Normal ROM.  No clubbing, cyanosis or edema.   LYMPH: No acute cervical adenopathy.  NEURO: Alert, oriented, grossly unremarkable  PSYCH: Cooperative, appropriate.

## 2021-10-02 NOTE — ED ADULT NURSE NOTE - NSICDXPASTSURGICALHX_GEN_ALL_CORE_FT
PAST SURGICAL HISTORY:  Capsular cataract of right eye     History of surgery BROKEN LEG SURGERY

## 2021-10-02 NOTE — ED ADULT NURSE NOTE - OBJECTIVE STATEMENT
BIBA from home with complaints of weakness and fever but never took his temperature at home. Patient currently vaccinated with Moderna since January

## 2021-10-02 NOTE — H&P ADULT - HISTORY OF PRESENT ILLNESS
Pt with partial memory loss, information obtained from chart review.    71 Y M with PMH of CVA, partial seizures ADAIR, DM, DLD, BPH, gait disorder (mainly mechanical) followed by neuro outpatient presenting to the ED for generalized weakness. Pt states over the past few weeks, he has been unable to "tie his shoelaces and put on his jeans, because it makes him feel SOB. Denies dyspnea when walking up the stairs or walking his dog. States that he feels a lot more wobbly than usual and has fallen multiple times, most recent episode last night at home.  States that he sees the neurologist Dr Bullock o/p. Says he is compliant with all his medications and lives with his son Rosalino.    On my interview, pt answers "I don't remember to most questions"    Denies chest pain, orthopnea, PND, SOB on exertion, abdominal pain, weight loss, loss of appetite, LOC, syncope, trauma.     Vitals signifcant for elevated BP in the ED likely due to medication interruption. Labs significant for trop 0.02. EKG with LVH, RBBB, T wave inversions. Pt states he has seen a cardiologist before but does not recall who or when.

## 2021-10-02 NOTE — ED ADULT NURSE NOTE - NSICDXPASTMEDICALHX_GEN_ALL_CORE_FT
PAST MEDICAL HISTORY:  Anxiety     Cerebrovascular accident (CVA), unspecified mechanism     Hypertension, unspecified type     Neuropathy     Nonintractable epilepsy without status epilepticus, unspecified epilepsy type     Type 2 diabetes mellitus with complication, without long-term current use of insulin

## 2021-10-03 LAB
ALBUMIN SERPL ELPH-MCNC: 3.9 G/DL — SIGNIFICANT CHANGE UP (ref 3.5–5.2)
ALP SERPL-CCNC: 58 U/L — SIGNIFICANT CHANGE UP (ref 30–115)
ALT FLD-CCNC: 15 U/L — SIGNIFICANT CHANGE UP (ref 0–41)
ANION GAP SERPL CALC-SCNC: 20 MMOL/L — HIGH (ref 7–14)
AST SERPL-CCNC: 25 U/L — SIGNIFICANT CHANGE UP (ref 0–41)
BILIRUB SERPL-MCNC: 1 MG/DL — SIGNIFICANT CHANGE UP (ref 0.2–1.2)
BUN SERPL-MCNC: 12 MG/DL — SIGNIFICANT CHANGE UP (ref 10–20)
CALCIUM SERPL-MCNC: 8.7 MG/DL — SIGNIFICANT CHANGE UP (ref 8.5–10.1)
CHLORIDE SERPL-SCNC: 94 MMOL/L — LOW (ref 98–110)
CO2 SERPL-SCNC: 18 MMOL/L — SIGNIFICANT CHANGE UP (ref 17–32)
CREAT SERPL-MCNC: 0.8 MG/DL — SIGNIFICANT CHANGE UP (ref 0.7–1.5)
CULTURE RESULTS: SIGNIFICANT CHANGE UP
GLUCOSE BLDC GLUCOMTR-MCNC: 152 MG/DL — HIGH (ref 70–99)
GLUCOSE BLDC GLUCOMTR-MCNC: 166 MG/DL — HIGH (ref 70–99)
GLUCOSE BLDC GLUCOMTR-MCNC: 168 MG/DL — HIGH (ref 70–99)
GLUCOSE BLDC GLUCOMTR-MCNC: 179 MG/DL — HIGH (ref 70–99)
GLUCOSE SERPL-MCNC: 142 MG/DL — HIGH (ref 70–99)
HCT VFR BLD CALC: 44.5 % — SIGNIFICANT CHANGE UP (ref 42–52)
HGB BLD-MCNC: 14.9 G/DL — SIGNIFICANT CHANGE UP (ref 14–18)
MCHC RBC-ENTMCNC: 30.2 PG — SIGNIFICANT CHANGE UP (ref 27–31)
MCHC RBC-ENTMCNC: 33.5 G/DL — SIGNIFICANT CHANGE UP (ref 32–37)
MCV RBC AUTO: 90.1 FL — SIGNIFICANT CHANGE UP (ref 80–94)
NRBC # BLD: 0 /100 WBCS — SIGNIFICANT CHANGE UP (ref 0–0)
PLATELET # BLD AUTO: 150 K/UL — SIGNIFICANT CHANGE UP (ref 130–400)
POTASSIUM SERPL-MCNC: 4.1 MMOL/L — SIGNIFICANT CHANGE UP (ref 3.5–5)
POTASSIUM SERPL-SCNC: 4.1 MMOL/L — SIGNIFICANT CHANGE UP (ref 3.5–5)
PROT SERPL-MCNC: 6.7 G/DL — SIGNIFICANT CHANGE UP (ref 6–8)
RBC # BLD: 4.94 M/UL — SIGNIFICANT CHANGE UP (ref 4.7–6.1)
RBC # FLD: 12.4 % — SIGNIFICANT CHANGE UP (ref 11.5–14.5)
SODIUM SERPL-SCNC: 132 MMOL/L — LOW (ref 135–146)
SPECIMEN SOURCE: SIGNIFICANT CHANGE UP
TROPONIN T SERPL-MCNC: 0.01 NG/ML — SIGNIFICANT CHANGE UP
WBC # BLD: 9.61 K/UL — SIGNIFICANT CHANGE UP (ref 4.8–10.8)
WBC # FLD AUTO: 9.61 K/UL — SIGNIFICANT CHANGE UP (ref 4.8–10.8)

## 2021-10-03 PROCEDURE — 93306 TTE W/DOPPLER COMPLETE: CPT | Mod: 26

## 2021-10-03 PROCEDURE — 99233 SBSQ HOSP IP/OBS HIGH 50: CPT

## 2021-10-03 RX ORDER — HALOPERIDOL DECANOATE 100 MG/ML
2 INJECTION INTRAMUSCULAR ONCE
Refills: 0 | Status: COMPLETED | OUTPATIENT
Start: 2021-10-03 | End: 2021-10-03

## 2021-10-03 RX ADMIN — OXCARBAZEPINE 300 MILLIGRAM(S): 300 TABLET, FILM COATED ORAL at 17:33

## 2021-10-03 RX ADMIN — Medication 2: at 12:09

## 2021-10-03 RX ADMIN — ESCITALOPRAM OXALATE 10 MILLIGRAM(S): 10 TABLET, FILM COATED ORAL at 11:23

## 2021-10-03 RX ADMIN — ATORVASTATIN CALCIUM 40 MILLIGRAM(S): 80 TABLET, FILM COATED ORAL at 21:39

## 2021-10-03 RX ADMIN — Medication 2: at 17:32

## 2021-10-03 RX ADMIN — HEPARIN SODIUM 5000 UNIT(S): 5000 INJECTION INTRAVENOUS; SUBCUTANEOUS at 14:16

## 2021-10-03 RX ADMIN — OXCARBAZEPINE 300 MILLIGRAM(S): 300 TABLET, FILM COATED ORAL at 05:30

## 2021-10-03 RX ADMIN — Medication 20 MILLIGRAM(S): at 05:30

## 2021-10-03 RX ADMIN — HEPARIN SODIUM 5000 UNIT(S): 5000 INJECTION INTRAVENOUS; SUBCUTANEOUS at 21:39

## 2021-10-03 RX ADMIN — LEVETIRACETAM 750 MILLIGRAM(S): 250 TABLET, FILM COATED ORAL at 05:30

## 2021-10-03 RX ADMIN — Medication 15 MILLIGRAM(S): at 05:30

## 2021-10-03 RX ADMIN — HALOPERIDOL DECANOATE 2 MILLIGRAM(S): 100 INJECTION INTRAMUSCULAR at 18:19

## 2021-10-03 RX ADMIN — CLOPIDOGREL BISULFATE 75 MILLIGRAM(S): 75 TABLET, FILM COATED ORAL at 11:24

## 2021-10-03 RX ADMIN — Medication 5 MILLIGRAM(S): at 21:39

## 2021-10-03 RX ADMIN — Medication 2: at 08:21

## 2021-10-03 RX ADMIN — Medication 50 MILLIGRAM(S): at 17:33

## 2021-10-03 RX ADMIN — Medication 50 MILLIGRAM(S): at 05:30

## 2021-10-03 RX ADMIN — Medication 81 MILLIGRAM(S): at 11:23

## 2021-10-03 RX ADMIN — HEPARIN SODIUM 5000 UNIT(S): 5000 INJECTION INTRAVENOUS; SUBCUTANEOUS at 05:30

## 2021-10-03 RX ADMIN — Medication 20 MILLIGRAM(S): at 17:33

## 2021-10-03 RX ADMIN — LEVETIRACETAM 750 MILLIGRAM(S): 250 TABLET, FILM COATED ORAL at 17:33

## 2021-10-03 NOTE — PROGRESS NOTE ADULT - ATTENDING COMMENTS
71 Y M with PMH of CVA, partial seizures ADAIR, DM, DLD, BPH, gait disorder (mainly mechanical) followed by neuro outpatient presenting to the ED for generalized weakness. Pt states over the past few weeks, he has been unable to "tie his shoelaces and put on his jeans, because it makes him feel SOB. Denies dyspnea when walking up the stairs or walking his dog. States that he feels a lot more wobbly than usual and has fallen multiple times, most recent episode last night at home.  States that he sees the neurologist Dr Bullock o/p. Says he is compliant with all his medications and lives with his son Rosalino. Has a cane for walking but does not use it.     On my interview, pt answers "I don't remember to most questions"    Denies chest pain, orthopnea, PND, SOB on exertion, abdominal pain, weight loss, loss of appetite, LOC, dizziness, syncope, trauma.     #Generalized weakness and gait disorder  -s/p multiple falls   -f/u PT consult   -CTH Negative  orthostatic vitals - neg   Lumbar puncture to r/o GBS   Pt has a history of peripheral neuropathy but weakness significantly worse dale after having a diarrhea   f/u MRI Thoracolumbar spine    #Episodic SOB  -f/u rpt trops 0.02 > 0.02 > 0.03  -no b/l LE edema   echo - EF 50-55%, GIDD   BNP 11k  CXR in ED negative for acute cardiopulmonary disease       #Hx of CVA   No residual symptoms   cont ASA + Plavix     #Partial seizures  -f/u keppra level  -cont keppra @ home dose     #HTN   cont enalapril and metoprolol     #DM  f/u A1c   cont SS    #ADAIR  cont CPAP @ night       #DLD  cont atorvastatin    #BPH  #Urge incontinence   cont oxybutinin and oxcarbazepine     *Resume all home medications except contraindicated    *Pt son Cale cna be reached at - [phone]    #DVT PPX : Heparin Suq  FULL CODE    Progress Note Handoff:  Pending: LP and MRI   Dispo: Acute

## 2021-10-03 NOTE — PROGRESS NOTE ADULT - SUBJECTIVE AND OBJECTIVE BOX
ANN BLANCO 71y Male  MRN#: [id number]   CODE STATUS: Full code    Hospital Day: 1d    Pt is currently admitted with the primary diagnosis of generalized weakness    SUBJECTIVE  Hospital Course    Pt with partial memory loss, information obtained from chart review.    71 Y M with PMH of CVA, partial seizures ADAIR, DM, DLD, BPH, gait disorder (mainly mechanical) followed by neuro outpatient presenting to the ED for generalized weakness. Pt states over the past few weeks, he has been unable to "tie his shoelaces and put on his jeans, because it makes him feel SOB. Denies dyspnea when walking up the stairs or walking his dog. States that he feels a lot more wobbly than usual and has fallen multiple times, most recent episode last night at home.  States that he sees the neurologist Dr Bullock o/gómez. Says he is compliant with all his medications and lives with his son Rosalino.    On my interview, pt answers "I don't remember to most questions"    Denies chest pain, orthopnea, PND, SOB on exertion, abdominal pain, weight loss, loss of appetite, LOC, syncope, trauma.     Vitals significant for elevated BP in the ED likely due to medication interruption. Labs significant for trop 0.02. EKG with LVH, RBBB, T wave inversions. Pt states he has seen a cardiologist before but does not recall who or when.    Patient admitted to medicine for further evaluation and management     Overnight events   None  Subjective complaints   Weakness                                          ----------------------------------------------------------  OBJECTIVE  PAST MEDICAL & SURGICAL HISTORY  Nonintractable epilepsy without status epilepticus, unspecified epilepsy type    Neuropathy    Hypertension, unspecified type    Type 2 diabetes mellitus with complication, without long-term current use of insulin    Anxiety    Cerebrovascular accident (CVA), unspecified mechanism    History of surgery  BROKEN LEG SURGERY    Capsular cataract of right eye                                              -----------------------------------------------------------  ALLERGIES:  No Known Allergies                                            ------------------------------------------------------------    HOME MEDICATIONS  Home Medications:  aspirin 81 mg oral tablet: 1 tab(s) orally once a day (2019 07:03)  busPIRone 15 mg oral tablet: 1 tab(s) orally once a day (02 Oct 2021 12:34)  enalapril 20 mg oral tablet: 1 tab(s) orally 2 times a day (02 Oct 2021 13:00)  escitalopram 20 mg oral tablet: 1 tab(s) orally 2 times a day (02 Oct 2021 13:01)  gabapentin 400 mg oral tablet: 1 tab(s) orally once a day (02 Oct 2021 12:59)  Keppra 750 mg oral tablet: 1 tab(s) orally 2 times a day (02 Oct 2021 13:03)  Lipitor 40 mg oral tablet: 1 tab(s) orally once a day (2019 07:03)  metFORMIN 750 mg oral tablet, extended release: 1 tab(s) orally once a day (02 Oct 2021 12:31)  metoprolol succinate 50 mg oral tablet, extended release: 1 tab(s) orally once a day (02 Oct 2021 12:33)  OXcarbazepine 300 mg oral tablet, extended release: 1 tab(s) orally once a day (02 Oct 2021 13:04)  oxybutynin 15 mg/24 hr oral tablet, extended release: 1 tab(s) orally once a day (02 Oct 2021 12:34)  Plavix 75 mg oral tablet: 1 tab(s) orally once a day (2019 07:03)                           MEDICATIONS:  STANDING MEDICATIONS  aspirin  chewable 81 milliGRAM(s) Oral daily  atorvastatin 40 milliGRAM(s) Oral at bedtime  busPIRone 15 milliGRAM(s) Oral <User Schedule>  clopidogrel Tablet 75 milliGRAM(s) Oral daily  dextrose 40% Gel 15 Gram(s) Oral once  dextrose 5%. 1000 milliLiter(s) IV Continuous <Continuous>  dextrose 5%. 1000 milliLiter(s) IV Continuous <Continuous>  dextrose 50% Injectable 25 Gram(s) IV Push once  dextrose 50% Injectable 12.5 Gram(s) IV Push once  dextrose 50% Injectable 25 Gram(s) IV Push once  enalapril 20 milliGRAM(s) Oral two times a day  escitalopram 10 milliGRAM(s) Oral daily  glucagon  Injectable 1 milliGRAM(s) IntraMuscular once  heparin   Injectable 5000 Unit(s) SubCutaneous every 8 hours  insulin lispro (ADMELOG) corrective regimen sliding scale   SubCutaneous three times a day before meals  levETIRAcetam 750 milliGRAM(s) Oral two times a day  lidocaine 1% Injectable 10 milliLiter(s) Local Injection once  melatonin 5 milliGRAM(s) Oral at bedtime  metoprolol tartrate 50 milliGRAM(s) Oral two times a day  OXcarbazepine 300 milliGRAM(s) Oral two times a day    PRN MEDICATIONS                                            ------------------------------------------------------------  VITAL SIGNS: Last 24 Hours  T(C): 36.7 (03 Oct 2021 05:00), Max: 37 (02 Oct 2021 21:05)  T(F): 98.1 (03 Oct 2021 05:00), Max: 98.6 (02 Oct 2021 21:05)  HR: 95 (03 Oct 2021 05:00) (84 - 95)  BP: 176/83 (03 Oct 2021 06:47) (154/79 - 182/95)  BP(mean): --  RR: 18 (03 Oct 2021 06:47) (18 - 18)  SpO2: --      10-02-21 @ 07:01  -  10-03-21 @ 07:00  --------------------------------------------------------  IN: 0 mL / OUT: 500 mL / NET: -500 mL                                             --------------------------------------------------------------  LABS:                        15.4   8.49  )-----------( 164      ( 02 Oct 2021 01:09 )             46.6     10    138  |  101  |  13  ----------------------------<  213<H>  4.0   |  21  |  0.9    Ca    8.8      02 Oct 2021 01:09  Phos  3.0     10-02  Mg     1.8     10-02    TPro  6.8  /  Alb  4.0  /  TBili  0.4  /  DBili  x   /  AST  15  /  ALT  15  /  AlkPhos  65  10-02      Urinalysis Basic - ( 02 Oct 2021 01:09 )    Color: Yellow / Appearance: Clear / S.021 / pH: x  Gluc: x / Ketone: Trace  / Bili: Negative / Urobili: <2 mg/dL   Blood: x / Protein: 100 mg/dL / Nitrite: Negative   Leuk Esterase: Negative / RBC: 1 /HPF / WBC 1 /HPF   Sq Epi: x / Non Sq Epi: 1 /HPF / Bacteria: Negative        Troponin T, Serum: 0.03 ng/mL *HH* (10-02-21 @ 16:00)  Troponin T, Serum: 0.02 ng/mL *H* (10-02-21 @ 11:00)          CARDIAC MARKERS ( 02 Oct 2021 16:00 )  x     / 0.03 ng/mL / x     / x     / x      CARDIAC MARKERS ( 02 Oct 2021 11:00 )  x     / 0.02 ng/mL / x     / x     / x      CARDIAC MARKERS ( 02 Oct 2021 01:09 )  x     / 0.02 ng/mL / x     / x     / x                                                  -------------------------------------------------------------  RADIOLOGY:  < from: CT Head No Cont (10.02.21 @ 11:35) >  1.  No evidence of acute intracranial hemorrhage.    2.  Lacunar infarcts within the right frontal lobe and genu of the corpus callosum of indeterminate age.    3.  Chronic microvascular changes and a chronic lacunar infarct within the right corona radiata.    < end of copied text >  < from: Xray Chest 1 View- PORTABLE-Urgent (10.02.21 @ 03:49) >  No acute abnormality on frontal chest radiograph.    < end of copied text >                                            --------------------------------------------------------------    PHYSICAL EXAM:  HEAD:  Atraumatic, Normocephalic  EYES: EOMI, PERRLA, conjunctiva and sclera clear  NECK: Supple, No JVD  CHEST/LUNG: Clear to auscultation bilaterally; No wheeze; No crackles; No accessory muscles used  HEART: Regular rate and rhythm; No murmurs;   ABDOMEN: Soft, Nontender, Nondistended; Bowel sounds present; No guarding  EXTREMITIES:  2+ Peripheral Pulses, No cyanosis or edema  PSYCH: AAOx3  NEUROLOGY: non-focal  SKIN: No rashes or lesions                                             --------------------------------------------------------------    ASSESSMENT & PLAN    71 Y M with PMH of CVA, partial seizures ADAIR, DM, DLD, BPH, gait disorder (mainly mechanical) followed by neuro outpatient presenting to the ED for generalized weakness. Pt states over the past few weeks, he has been unable to "tie his shoelaces and put on his jeans, because it makes him feel SOB. Denies dyspnea when walking up the stairs or walking his dog. States that he feels a lot more wobbly than usual and has fallen multiple times, most recent episode last night at home.  States that he sees the neurologist Dr Bullock o/p. Says he is compliant with all his medications and lives with his son Rosalino. Has a cane for walking but does not use it.     On my interview, pt answers "I don't remember to most questions"    Denies chest pain, orthopnea, PND, SOB on exertion, abdominal pain, weight loss, loss of appetite, LOC, dizziness, syncope, trauma.     #Generalized weakness and gait disorder  -s/p multiple falls   -f/u PT consult   -CTH Negative  -f/u orthostatic vitals    #Episodic SOB  -f/u rpt trops 0.02 on admission, denies chest pain   -f/u rpt EKG   -no b/l LE edema   -f/u echo  -f/u BNP   - CXR in ED negative for acute cardiopulmonary disease       #Hx of CVA   No residual symptoms   cont ASA + Plavix     #Partial seizures  -f/u keppra level  -cont keppra @ home dose     #HTN   cont enalapril and metoprolol     #DM  f/u A1c in AM   cont SS    #ADAIR  cont CPAP @ night       #DLD  cont atorvastatin    #BPH  #Urge incontinence   cont oxybutinin and oxcarbazepine     *Resume all home medications except contraindicated    *Pt winnie Madsen cna be reached at - [phone]    #DVT PPX : Heparin Suq  #Diet:DASH/TLC  #GI PPX: Not indicated  #Activity:IAT  FULL CODE

## 2021-10-04 LAB
A1C WITH ESTIMATED AVERAGE GLUCOSE RESULT: 6.9 % — HIGH (ref 4–5.6)
A1C WITH ESTIMATED AVERAGE GLUCOSE RESULT: 7 % — HIGH (ref 4–5.6)
ALBUMIN SERPL ELPH-MCNC: 3.8 G/DL — SIGNIFICANT CHANGE UP (ref 3.5–5.2)
ALP SERPL-CCNC: 59 U/L — SIGNIFICANT CHANGE UP (ref 30–115)
ALT FLD-CCNC: 21 U/L — SIGNIFICANT CHANGE UP (ref 0–41)
ANION GAP SERPL CALC-SCNC: 15 MMOL/L — HIGH (ref 7–14)
APTT BLD: 33.8 SEC — SIGNIFICANT CHANGE UP (ref 27–39.2)
AST SERPL-CCNC: 32 U/L — SIGNIFICANT CHANGE UP (ref 0–41)
BASOPHILS # BLD AUTO: 0.03 K/UL — SIGNIFICANT CHANGE UP (ref 0–0.2)
BASOPHILS NFR BLD AUTO: 0.3 % — SIGNIFICANT CHANGE UP (ref 0–1)
BILIRUB SERPL-MCNC: 0.8 MG/DL — SIGNIFICANT CHANGE UP (ref 0.2–1.2)
BUN SERPL-MCNC: 18 MG/DL — SIGNIFICANT CHANGE UP (ref 10–20)
CALCIUM SERPL-MCNC: 8.9 MG/DL — SIGNIFICANT CHANGE UP (ref 8.5–10.1)
CHLORIDE SERPL-SCNC: 97 MMOL/L — LOW (ref 98–110)
CO2 SERPL-SCNC: 21 MMOL/L — SIGNIFICANT CHANGE UP (ref 17–32)
COVID-19 SPIKE DOMAIN AB INTERP: POSITIVE
COVID-19 SPIKE DOMAIN ANTIBODY RESULT: 171 U/ML — HIGH
CREAT SERPL-MCNC: 0.8 MG/DL — SIGNIFICANT CHANGE UP (ref 0.7–1.5)
EOSINOPHIL # BLD AUTO: 0.03 K/UL — SIGNIFICANT CHANGE UP (ref 0–0.7)
EOSINOPHIL NFR BLD AUTO: 0.3 % — SIGNIFICANT CHANGE UP (ref 0–8)
ESTIMATED AVERAGE GLUCOSE: 151 MG/DL — HIGH (ref 68–114)
ESTIMATED AVERAGE GLUCOSE: 154 MG/DL — HIGH (ref 68–114)
GLUCOSE BLDC GLUCOMTR-MCNC: 150 MG/DL — HIGH (ref 70–99)
GLUCOSE BLDC GLUCOMTR-MCNC: 178 MG/DL — HIGH (ref 70–99)
GLUCOSE BLDC GLUCOMTR-MCNC: 179 MG/DL — HIGH (ref 70–99)
GLUCOSE BLDC GLUCOMTR-MCNC: 197 MG/DL — HIGH (ref 70–99)
GLUCOSE SERPL-MCNC: 181 MG/DL — HIGH (ref 70–99)
HCT VFR BLD CALC: 44 % — SIGNIFICANT CHANGE UP (ref 42–52)
HCV AB S/CO SERPL IA: 0.04 COI — SIGNIFICANT CHANGE UP
HCV AB SERPL-IMP: SIGNIFICANT CHANGE UP
HGB BLD-MCNC: 15 G/DL — SIGNIFICANT CHANGE UP (ref 14–18)
IMM GRANULOCYTES NFR BLD AUTO: 0.5 % — HIGH (ref 0.1–0.3)
INR BLD: 1.11 RATIO — SIGNIFICANT CHANGE UP (ref 0.65–1.3)
LYMPHOCYTES # BLD AUTO: 1.03 K/UL — LOW (ref 1.2–3.4)
LYMPHOCYTES # BLD AUTO: 11.6 % — LOW (ref 20.5–51.1)
MAGNESIUM SERPL-MCNC: 2.1 MG/DL — SIGNIFICANT CHANGE UP (ref 1.8–2.4)
MCHC RBC-ENTMCNC: 30.3 PG — SIGNIFICANT CHANGE UP (ref 27–31)
MCHC RBC-ENTMCNC: 34.1 G/DL — SIGNIFICANT CHANGE UP (ref 32–37)
MCV RBC AUTO: 88.9 FL — SIGNIFICANT CHANGE UP (ref 80–94)
MONOCYTES # BLD AUTO: 1.57 K/UL — HIGH (ref 0.1–0.6)
MONOCYTES NFR BLD AUTO: 17.7 % — HIGH (ref 1.7–9.3)
NEUTROPHILS # BLD AUTO: 6.18 K/UL — SIGNIFICANT CHANGE UP (ref 1.4–6.5)
NEUTROPHILS NFR BLD AUTO: 69.6 % — SIGNIFICANT CHANGE UP (ref 42.2–75.2)
NRBC # BLD: 0 /100 WBCS — SIGNIFICANT CHANGE UP (ref 0–0)
PLATELET # BLD AUTO: 168 K/UL — SIGNIFICANT CHANGE UP (ref 130–400)
POTASSIUM SERPL-MCNC: 3.9 MMOL/L — SIGNIFICANT CHANGE UP (ref 3.5–5)
POTASSIUM SERPL-SCNC: 3.9 MMOL/L — SIGNIFICANT CHANGE UP (ref 3.5–5)
PROT SERPL-MCNC: 6.8 G/DL — SIGNIFICANT CHANGE UP (ref 6–8)
PROTHROM AB SERPL-ACNC: 12.8 SEC — SIGNIFICANT CHANGE UP (ref 9.95–12.87)
RBC # BLD: 4.95 M/UL — SIGNIFICANT CHANGE UP (ref 4.7–6.1)
RBC # FLD: 12.3 % — SIGNIFICANT CHANGE UP (ref 11.5–14.5)
SARS-COV-2 IGG+IGM SERPL QL IA: 171 U/ML — HIGH
SARS-COV-2 IGG+IGM SERPL QL IA: POSITIVE
SODIUM SERPL-SCNC: 133 MMOL/L — LOW (ref 135–146)
TSH SERPL-MCNC: 2.26 UIU/ML — SIGNIFICANT CHANGE UP (ref 0.27–4.2)
WBC # BLD: 8.88 K/UL — SIGNIFICANT CHANGE UP (ref 4.8–10.8)
WBC # FLD AUTO: 8.88 K/UL — SIGNIFICANT CHANGE UP (ref 4.8–10.8)

## 2021-10-04 PROCEDURE — 99232 SBSQ HOSP IP/OBS MODERATE 35: CPT

## 2021-10-04 RX ORDER — HYDRALAZINE HCL 50 MG
25 TABLET ORAL
Refills: 0 | Status: DISCONTINUED | OUTPATIENT
Start: 2021-10-04 | End: 2021-10-12

## 2021-10-04 RX ADMIN — Medication 81 MILLIGRAM(S): at 12:08

## 2021-10-04 RX ADMIN — Medication 50 MILLIGRAM(S): at 17:09

## 2021-10-04 RX ADMIN — Medication 20 MILLIGRAM(S): at 05:38

## 2021-10-04 RX ADMIN — ESCITALOPRAM OXALATE 10 MILLIGRAM(S): 10 TABLET, FILM COATED ORAL at 12:08

## 2021-10-04 RX ADMIN — OXCARBAZEPINE 300 MILLIGRAM(S): 300 TABLET, FILM COATED ORAL at 17:09

## 2021-10-04 RX ADMIN — HEPARIN SODIUM 5000 UNIT(S): 5000 INJECTION INTRAVENOUS; SUBCUTANEOUS at 21:38

## 2021-10-04 RX ADMIN — Medication 2: at 17:10

## 2021-10-04 RX ADMIN — CLOPIDOGREL BISULFATE 75 MILLIGRAM(S): 75 TABLET, FILM COATED ORAL at 12:08

## 2021-10-04 RX ADMIN — LEVETIRACETAM 750 MILLIGRAM(S): 250 TABLET, FILM COATED ORAL at 05:38

## 2021-10-04 RX ADMIN — HEPARIN SODIUM 5000 UNIT(S): 5000 INJECTION INTRAVENOUS; SUBCUTANEOUS at 13:20

## 2021-10-04 RX ADMIN — Medication 2: at 12:08

## 2021-10-04 RX ADMIN — Medication 50 MILLIGRAM(S): at 05:38

## 2021-10-04 RX ADMIN — OXCARBAZEPINE 300 MILLIGRAM(S): 300 TABLET, FILM COATED ORAL at 05:38

## 2021-10-04 RX ADMIN — Medication 2: at 07:53

## 2021-10-04 RX ADMIN — ATORVASTATIN CALCIUM 40 MILLIGRAM(S): 80 TABLET, FILM COATED ORAL at 21:38

## 2021-10-04 RX ADMIN — Medication 15 MILLIGRAM(S): at 05:38

## 2021-10-04 RX ADMIN — LEVETIRACETAM 750 MILLIGRAM(S): 250 TABLET, FILM COATED ORAL at 17:09

## 2021-10-04 RX ADMIN — Medication 5 MILLIGRAM(S): at 21:38

## 2021-10-04 RX ADMIN — Medication 25 MILLIGRAM(S): at 17:09

## 2021-10-04 RX ADMIN — Medication 20 MILLIGRAM(S): at 17:09

## 2021-10-04 RX ADMIN — HEPARIN SODIUM 5000 UNIT(S): 5000 INJECTION INTRAVENOUS; SUBCUTANEOUS at 05:38

## 2021-10-04 NOTE — PROGRESS NOTE ADULT - ATTENDING COMMENTS
71 Y M with PMH of CVA, partial seizures ADAIR, DM, DLD, BPH, gait disorder (mainly mechanical) followed by neuro outpatient presenting to the ED for generalized weakness. Pt states over the past few weeks, he has been unable to "tie his shoelaces and put on his jeans, because it makes him feel SOB. Denies dyspnea when walking up the stairs or walking his dog. States that he feels a lot more wobbly than usual and has fallen multiple times, most recent episode last night at home.  States that he sees the neurologist Dr Bullock o/p. Says he is compliant with all his medications and lives with his son Rosalino. Has a cane for walking but does not use it.     #Generalized weakness and gait disorder  #r/o Guillain-Barré  CTH Negative  Orthostatic NG  b/l LE weakness  recent hx of diarrhea  LP attempted 10/04 by Medical team as well as Dr. Brannon Neurology Attending- unsuccessful, IR consulted for LP  f/u B12, TSH, DR/MO  PT recs Rehab    #Episodic SOB- ACS ruled out  trops 0.02 on admission->0.03--> 0.01 / BNP 1142  Pt denies chest pain   rpt EKG   no b/l LE edema   TTE 10/03: Left ventricular ejection fraction, by visual estimation, is 50 to 55%   CXR in ED negative for acute cardiopulmonary disease       #Hx of CVA   No residual symptoms   cont ASA + Plavix     #Partial seizures  f/u keppra level AM  cont keppra @ home dose     #HTN   cont enalapril and metoprolol     #DM  A1c 6.9  -cont SS    #ADAIR  -cont CPAP @ night       #DLD  -cont atorvastatin    #BPH  #Urge incontinence   -cont oxybutinin and oxcarbazepine     *Pt son Cale cna be reached at - [phone]    #DVT PPX : Heparin Sq  FULL CODE     Progress Note handoff:   Pending: LP, vit B12, TSH follow up, neurology  Dispo: STR once work up is negative

## 2021-10-04 NOTE — PHYSICAL THERAPY INITIAL EVALUATION ADULT - SPECIFY REASON(S)
Chart reviewed. Pt. currently without activity orders. PT evaluation on hold pending OOB orders as appropriate. Will follow.
1100 am Attempted to see pt for PT however pt appears very somnolent , uanble to fully enagage/participate with PT. Pt. currently has a PCA sit in the room due to high risk for falls and confused MS.

## 2021-10-04 NOTE — PROGRESS NOTE ADULT - SUBJECTIVE AND OBJECTIVE BOX
Hospital Day:  2d    Subjective: Patient is a 71y old  Male who presents with a chief complaint of Generalized weakness (03 Oct 2021 08:10)    Hospital Course:    Pt with partial memory loss, information obtained from chart review.    71 Y M with PMH of CVA, partial seizures ADAIR, DM, DLD, BPH, gait disorder (mainly mechanical) followed by neuro outpatient presenting to the ED for generalized weakness. Pt states over the past few weeks, he has been unable to "tie his shoelaces and put on his jeans, because it makes him feel SOB. Denies dyspnea when walking up the stairs or walking his dog. States that he feels a lot more wobbly than usual and has fallen multiple times, most recent episode last night at home.  States that he sees the neurologist Dr Bullock o/gómez. Says he is compliant with all his medications and lives with his son Rosalino.  Denies chest pain, orthopnea, PND, SOB on exertion, abdominal pain, weight loss, loss of appetite, LOC, syncope, trauma.   Vitals significant for elevated BP in the ED likely due to medication interruption. Labs significant for trop 0.02. EKG with LVH, RBBB, T wave inversions. Pt states he has seen a cardiologist before but does not recall who or when.  Patient admitted to medicine for further evaluation and management     Pt seen and evaluated at bedside. Still admits to pain.    Complaints: weakness  Over the night Events: None    Past Medical Hx:   Nonintractable epilepsy without status epilepticus, unspecified epilepsy type    Neuropathy    Hypertension, unspecified type    Type 2 diabetes mellitus with complication, without long-term current use of insulin    Anxiety    Cerebrovascular accident (CVA), unspecified mechanism      Past Sx:  History of surgery    Capsular cataract of right eye      Allergies:  No Known Allergies    Current Meds:   Standng Meds:  aspirin  chewable 81 milliGRAM(s) Oral daily  atorvastatin 40 milliGRAM(s) Oral at bedtime  busPIRone 15 milliGRAM(s) Oral <User Schedule>  clopidogrel Tablet 75 milliGRAM(s) Oral daily  dextrose 40% Gel 15 Gram(s) Oral once  dextrose 5%. 1000 milliLiter(s) (50 mL/Hr) IV Continuous <Continuous>  dextrose 5%. 1000 milliLiter(s) (100 mL/Hr) IV Continuous <Continuous>  dextrose 50% Injectable 25 Gram(s) IV Push once  dextrose 50% Injectable 12.5 Gram(s) IV Push once  dextrose 50% Injectable 25 Gram(s) IV Push once  enalapril 20 milliGRAM(s) Oral two times a day  escitalopram 10 milliGRAM(s) Oral daily  glucagon  Injectable 1 milliGRAM(s) IntraMuscular once  heparin   Injectable 5000 Unit(s) SubCutaneous every 8 hours  hydrALAZINE 25 milliGRAM(s) Oral two times a day  insulin lispro (ADMELOG) corrective regimen sliding scale   SubCutaneous three times a day before meals  levETIRAcetam 750 milliGRAM(s) Oral two times a day  lidocaine 1% Injectable 10 milliLiter(s) Local Injection once  melatonin 5 milliGRAM(s) Oral at bedtime  metoprolol tartrate 50 milliGRAM(s) Oral two times a day  OXcarbazepine 300 milliGRAM(s) Oral two times a day    PRN Meds:      Vital Signs:   T(F): 99.3 (10-04-21 @ 12:30), Max: 99.3 (10-04-21 @ 12:30)  HR: 92 (10-04-21 @ 12:30) (64 - 99)  BP: 159/85 (10-04-21 @ 12:30) (159/85 - 176/84)  RR: 18 (10-04-21 @ 12:30) (18 - 18)  SpO2: 96% (10-04-21 @ 05:22) (96% - 96%)    Physical Exam:   GENERAL: NAD, Resting in bed  HEENT: NCAT  CHEST/LUNG: Clear to auscultation bilaterally; No wheezing or rubs.   HEART: Regular rate and rhythm; No murmurs, rubs, or gallops  ABDOMEN: Bowel sounds present; Soft, Nontender, Nondistended.   EXTREMITIES:  No clubbing, cyanosis, or edema  NERVOUS SYSTEM:  Alert & Oriented X3    FLUID BALANCE    10-02-21 @ 07:01  -  10-03-21 @ 07:00  --------------------------------------------------------  IN: 0 mL / OUT: 500 mL / NET: -500 mL    10-03-21 @ 07:01  -  10-04-21 @ 07:00  --------------------------------------------------------  IN: 0 mL / OUT: 300 mL / NET: -300 mL        Labs:                         15.0   8.88  )-----------( 168      ( 04 Oct 2021 11:44 )             44.0     Neutophil% 69.6, Lymphocyte% 11.6, Monocyte% 17.7, Bands% 0.5 10-04-21 @ 11:44    04 Oct 2021 11:44    133    |  97     |  18     ----------------------------<  181    3.9     |  21     |  0.8      Ca    8.9        04 Oct 2021 11:44  Mg     2.1       04 Oct 2021 11:44    TPro  6.8    /  Alb  3.8    /  TBili  0.8    /  DBili  x      /  AST  32     /  ALT  21     /  AlkPhos  59     04 Oct 2021 11:44       pTT    33.8             ----< 1.11 INR  (10-04-21 @ 11:44)    12.80        PT        Serum Pro-Brain Natriuretic Peptide: 1142 pg/mL (10-02-21 @ 11:00)    Troponin 0.01, CKMB --, CK -- 10-03-21 @ 20:00  Troponin 0.03, CKMB --, CK -- 10-02-21 @ 16:00  Troponin 0.02, CKMB --, CK -- 10-02-21 @ 11:00  Troponin 0.02, CKMB --, CK -- 10-02-21 @ 01:09        Urinalysis Basic - ( 02 Oct 2021 01:09 )    Color: Yellow / Appearance: Clear / S.021 / pH: x  Gluc: x / Ketone: Trace  / Bili: Negative / Urobili: <2 mg/dL   Blood: x / Protein: 100 mg/dL / Nitrite: Negative   Leuk Esterase: Negative / RBC: 1 /HPF / WBC 1 /HPF   Sq Epi: x / Non Sq Epi: 1 /HPF / Bacteria: Negative          D-Dimer Assay, Quantitative: 64 ng/mL DDU (10-02-21 @ 11:00)        Radiology:     < from: CT Head No Cont (10.02.21 @ 11:35) >  IMPRESSION:    1.  No evidence of acute intracranial hemorrhage.    2.  Lacunar infarcts within the right frontal lobe and genu of the corpus callosum of indeterminate age.    3.  Chronic microvascular changes and a chronic lacunar infarct within the right corona radiata.    --- End of Report ---      < end of copied text >    < from: Xray Chest 1 View- PORTABLE-Urgent (10.02.21 @ 03:49) >  Impression:    No acute abnormality on frontal chest radiograph.      < end of copied text >

## 2021-10-04 NOTE — PROGRESS NOTE ADULT - ASSESSMENT
71 Y M with PMH of CVA, partial seizures ADAIR, DM, DLD, BPH, gait disorder (mainly mechanical) followed by neuro outpatient presenting to the ED for generalized weakness. Pt states over the past few weeks, he has been unable to "tie his shoelaces and put on his jeans, because it makes him feel SOB. Denies dyspnea when walking up the stairs or walking his dog. States that he feels a lot more wobbly than usual and has fallen multiple times, most recent episode last night at home.  States that he sees the neurologist Dr Bullock o/p. Says he is compliant with all his medications and lives with his son Rosalino. Has a cane for walking but does not use it.     #Generalized weakness and gait disorder  #r/o Guillain-Barré  -CTH Negative  -Orthostatic NG  -b/l LE weakness  -recent hx of diarrhea  -LP attempted 10/04 by Medical team and Dr. Brannon Neurology Attending- unsuccessful, IR consulted   - f/u B12, TSH, DR/WA    #Episodic SOB- ACS ruled out  -trops 0.02 on admission->0.03--> 0.01 / BNP 1142  -Pt denies chest pain   -rpt EKG   -no b/l LE edema   -TTE 10/03: Left ventricular ejection fraction, by visual estimation, is 50 to 55%   -CXR in ED negative for acute cardiopulmonary disease       #Hx of CVA   -No residual symptoms   -cont ASA + Plavix     #Partial seizures  -f/u keppra level  -cont keppra @ home dose     #HTN   cont enalapril and metoprolol     #DM  f/u A1c in AM   cont SS    #ADAIR  cont CPAP @ night       #DLD  cont atorvastatin    #BPH  #Urge incontinence   cont oxybutinin and oxcarbazepine     *Resume all home medications except contraindicated    *Pt son Cale cna be reached at - [phone]    #DVT PPX : Heparin Suq  #Diet:DASH/TLC  #GI PPX: Not indicated  #Activity:IAT  FULL CODE      71 Y M with PMH of CVA, partial seizures ADAIR, DM, DLD, BPH, gait disorder (mainly mechanical) followed by neuro outpatient presenting to the ED for generalized weakness. Pt states over the past few weeks, he has been unable to "tie his shoelaces and put on his jeans, because it makes him feel SOB. Denies dyspnea when walking up the stairs or walking his dog. States that he feels a lot more wobbly than usual and has fallen multiple times, most recent episode last night at home.  States that he sees the neurologist Dr Bullock o/p. Says he is compliant with all his medications and lives with his son Rosalino. Has a cane for walking but does not use it.     #Generalized weakness and gait disorder  #r/o Guillain-Barré  -CTH Negative  -Orthostatic NG  -b/l LE weakness  -recent hx of diarrhea  -LP attempted 10/04 by Medical team as well as Dr. Brannon Neurology Attending- unsuccessful, IR consulted for LP  - f/u B12, TSH, DR/AR  - PT recs Rehab    #Episodic SOB- ACS ruled out  -trops 0.02 on admission->0.03--> 0.01 / BNP 1142  -Pt denies chest pain   -rpt EKG   -no b/l LE edema   -TTE 10/03: Left ventricular ejection fraction, by visual estimation, is 50 to 55%   -CXR in ED negative for acute cardiopulmonary disease       #Hx of CVA   -No residual symptoms   -cont ASA + Plavix     #Partial seizures  -f/u keppra level AM  -cont keppra @ home dose     #HTN   -cont enalapril and metoprolol     #DM  -f/u A1c   -cont SS    #ADAIR  -cont CPAP @ night       #DLD  -cont atorvastatin    #BPH  #Urge incontinence   -cont oxybutinin and oxcarbazepine     *Pt son Cale cna be reached at - [phone]    #DVT PPX : Heparin Sq  #Diet:DASH/TLC  #GI PPX: Not indicated  #Activity:IAT  FULL CODE   #Dispo: Pending LP, vit B12, TSH follow up     71 Y M with PMH of CVA, partial seizures ADAIR, DM, DLD, BPH, gait disorder (mainly mechanical) followed by neuro outpatient presenting to the ED for generalized weakness. Pt states over the past few weeks, he has been unable to "tie his shoelaces and put on his jeans, because it makes him feel SOB. Denies dyspnea when walking up the stairs or walking his dog. States that he feels a lot more wobbly than usual and has fallen multiple times, most recent episode last night at home.  States that he sees the neurologist Dr Bullock o/p. Says he is compliant with all his medications and lives with his son Rosalino. Has a cane for walking but does not use it.     #Generalized weakness and gait disorder  #r/o Guillain-Barré  -CTH Negative  -Orthostatic NG  -b/l LE weakness  -recent hx of diarrhea  -LP attempted 10/04 by Medical team as well as Dr. Brannon Neurology Attending- unsuccessful, IR consulted for LP  - f/u B12, TSH, DR/ME  - PT recs Rehab  - LP scheduled by IR on Friday 10/8, Plavix on hold 10/4, make sure u hold Heparin 24 hrs prior and order XR lumbar and CSF required labs for 10/8    #Episodic SOB- ACS ruled out  -trops 0.02 on admission->0.03--> 0.01 / BNP 1142  -Pt denies chest pain   -rpt EKG   -no b/l LE edema   -TTE 10/03: Left ventricular ejection fraction, by visual estimation, is 50 to 55%   -CXR in ED negative for acute cardiopulmonary disease       #Hx of CVA   -No residual symptoms   -cont ASA + Plavix     #Partial seizures  -f/u keppra level AM  -cont keppra @ home dose     #HTN   -cont enalapril and metoprolol     #DM  -f/u A1c   -cont SS    #ADAIR  -cont CPAP @ night       #DLD  -cont atorvastatin    #BPH  #Urge incontinence   -cont oxybutinin and oxcarbazepine     *Pt winnie Madsen cna be reached at - [phone]    #DVT PPX : Heparin Sq  #Diet:DASH/TLC  #GI PPX: Not indicated  #Activity:IAT  FULL CODE   #Dispo: Pending LP, vit B12, TSH follow up

## 2021-10-04 NOTE — PHYSICAL THERAPY INITIAL EVALUATION ADULT - GENERAL OBSERVATIONS, REHAB EVAL
110-145 pm Chart reviewed. Pt. seen semirecline in bed , in No apparent distress , appears very somnolent but arousable, inconsistently following instructions, oriented X 2 , PCA sit at bedside , pt's son also present.

## 2021-10-04 NOTE — PHYSICAL THERAPY INITIAL EVALUATION ADULT - GAIT TRAINING, PT EVAL
EOAE (evoked otoacoustic emission)
Increase ambulation using a rolling walker, X 75  feet    with  supervision    assist by d/c.

## 2021-10-04 NOTE — PHYSICAL THERAPY INITIAL EVALUATION ADULT - PERTINENT HX OF CURRENT PROBLEM, REHAB EVAL
71 Y M with PMH of CVA, partial seizures ADAIR, DM, DLD, BPH, gait disorder (mainly mechanical) followed by neuro outpatient presenting to the ED for generalized weakness; multiple falls, referred to PT for eval and tx .

## 2021-10-05 LAB
ALBUMIN SERPL ELPH-MCNC: 3.4 G/DL — LOW (ref 3.5–5.2)
ALP SERPL-CCNC: 56 U/L — SIGNIFICANT CHANGE UP (ref 30–115)
ALT FLD-CCNC: 23 U/L — SIGNIFICANT CHANGE UP (ref 0–41)
ANION GAP SERPL CALC-SCNC: 18 MMOL/L — HIGH (ref 7–14)
AST SERPL-CCNC: 35 U/L — SIGNIFICANT CHANGE UP (ref 0–41)
BASOPHILS # BLD AUTO: 0.04 K/UL — SIGNIFICANT CHANGE UP (ref 0–0.2)
BASOPHILS NFR BLD AUTO: 0.5 % — SIGNIFICANT CHANGE UP (ref 0–1)
BILIRUB SERPL-MCNC: 1 MG/DL — SIGNIFICANT CHANGE UP (ref 0.2–1.2)
BUN SERPL-MCNC: 19 MG/DL — SIGNIFICANT CHANGE UP (ref 10–20)
CALCIUM SERPL-MCNC: 8.5 MG/DL — SIGNIFICANT CHANGE UP (ref 8.5–10.1)
CHLORIDE SERPL-SCNC: 96 MMOL/L — LOW (ref 98–110)
CO2 SERPL-SCNC: 19 MMOL/L — SIGNIFICANT CHANGE UP (ref 17–32)
CREAT SERPL-MCNC: 0.8 MG/DL — SIGNIFICANT CHANGE UP (ref 0.7–1.5)
EOSINOPHIL # BLD AUTO: 0.12 K/UL — SIGNIFICANT CHANGE UP (ref 0–0.7)
EOSINOPHIL NFR BLD AUTO: 1.6 % — SIGNIFICANT CHANGE UP (ref 0–8)
GLUCOSE BLDC GLUCOMTR-MCNC: 158 MG/DL — HIGH (ref 70–99)
GLUCOSE BLDC GLUCOMTR-MCNC: 159 MG/DL — HIGH (ref 70–99)
GLUCOSE BLDC GLUCOMTR-MCNC: 188 MG/DL — HIGH (ref 70–99)
GLUCOSE BLDC GLUCOMTR-MCNC: 191 MG/DL — HIGH (ref 70–99)
GLUCOSE SERPL-MCNC: 158 MG/DL — HIGH (ref 70–99)
HCT VFR BLD CALC: 42.5 % — SIGNIFICANT CHANGE UP (ref 42–52)
HGB BLD-MCNC: 14.6 G/DL — SIGNIFICANT CHANGE UP (ref 14–18)
IMM GRANULOCYTES NFR BLD AUTO: 0.4 % — HIGH (ref 0.1–0.3)
LYMPHOCYTES # BLD AUTO: 1.26 K/UL — SIGNIFICANT CHANGE UP (ref 1.2–3.4)
LYMPHOCYTES # BLD AUTO: 17.1 % — LOW (ref 20.5–51.1)
MAGNESIUM SERPL-MCNC: 2.1 MG/DL — SIGNIFICANT CHANGE UP (ref 1.8–2.4)
MCHC RBC-ENTMCNC: 30.6 PG — SIGNIFICANT CHANGE UP (ref 27–31)
MCHC RBC-ENTMCNC: 34.4 G/DL — SIGNIFICANT CHANGE UP (ref 32–37)
MCV RBC AUTO: 89.1 FL — SIGNIFICANT CHANGE UP (ref 80–94)
MONOCYTES # BLD AUTO: 1.39 K/UL — HIGH (ref 0.1–0.6)
MONOCYTES NFR BLD AUTO: 18.9 % — HIGH (ref 1.7–9.3)
NEUTROPHILS # BLD AUTO: 4.53 K/UL — SIGNIFICANT CHANGE UP (ref 1.4–6.5)
NEUTROPHILS NFR BLD AUTO: 61.5 % — SIGNIFICANT CHANGE UP (ref 42.2–75.2)
NRBC # BLD: 0 /100 WBCS — SIGNIFICANT CHANGE UP (ref 0–0)
PLATELET # BLD AUTO: 155 K/UL — SIGNIFICANT CHANGE UP (ref 130–400)
POTASSIUM SERPL-MCNC: 3.5 MMOL/L — SIGNIFICANT CHANGE UP (ref 3.5–5)
POTASSIUM SERPL-SCNC: 3.5 MMOL/L — SIGNIFICANT CHANGE UP (ref 3.5–5)
PROT SERPL-MCNC: 6.5 G/DL — SIGNIFICANT CHANGE UP (ref 6–8)
RBC # BLD: 4.77 M/UL — SIGNIFICANT CHANGE UP (ref 4.7–6.1)
RBC # FLD: 12.3 % — SIGNIFICANT CHANGE UP (ref 11.5–14.5)
SODIUM SERPL-SCNC: 133 MMOL/L — LOW (ref 135–146)
T PALLIDUM AB TITR SER: NEGATIVE — SIGNIFICANT CHANGE UP
VIT B12 SERPL-MCNC: 227 PG/ML — LOW (ref 232–1245)
WBC # BLD: 7.37 K/UL — SIGNIFICANT CHANGE UP (ref 4.8–10.8)
WBC # FLD AUTO: 7.37 K/UL — SIGNIFICANT CHANGE UP (ref 4.8–10.8)

## 2021-10-05 PROCEDURE — 99232 SBSQ HOSP IP/OBS MODERATE 35: CPT

## 2021-10-05 RX ORDER — PREGABALIN 225 MG/1
1000 CAPSULE ORAL
Refills: 0 | Status: DISCONTINUED | OUTPATIENT
Start: 2021-10-05 | End: 2021-10-12

## 2021-10-05 RX ADMIN — Medication 50 MILLIGRAM(S): at 17:35

## 2021-10-05 RX ADMIN — Medication 81 MILLIGRAM(S): at 11:26

## 2021-10-05 RX ADMIN — LEVETIRACETAM 750 MILLIGRAM(S): 250 TABLET, FILM COATED ORAL at 05:53

## 2021-10-05 RX ADMIN — OXCARBAZEPINE 300 MILLIGRAM(S): 300 TABLET, FILM COATED ORAL at 05:53

## 2021-10-05 RX ADMIN — PREGABALIN 1000 MICROGRAM(S): 225 CAPSULE ORAL at 21:20

## 2021-10-05 RX ADMIN — ESCITALOPRAM OXALATE 10 MILLIGRAM(S): 10 TABLET, FILM COATED ORAL at 11:26

## 2021-10-05 RX ADMIN — Medication 50 MILLIGRAM(S): at 05:53

## 2021-10-05 RX ADMIN — Medication 2: at 11:36

## 2021-10-05 RX ADMIN — Medication 20 MILLIGRAM(S): at 17:35

## 2021-10-05 RX ADMIN — Medication 2: at 08:19

## 2021-10-05 RX ADMIN — Medication 5 MILLIGRAM(S): at 21:20

## 2021-10-05 RX ADMIN — HEPARIN SODIUM 5000 UNIT(S): 5000 INJECTION INTRAVENOUS; SUBCUTANEOUS at 05:54

## 2021-10-05 RX ADMIN — Medication 25 MILLIGRAM(S): at 05:53

## 2021-10-05 RX ADMIN — Medication 2: at 16:21

## 2021-10-05 RX ADMIN — LEVETIRACETAM 750 MILLIGRAM(S): 250 TABLET, FILM COATED ORAL at 17:35

## 2021-10-05 RX ADMIN — ATORVASTATIN CALCIUM 40 MILLIGRAM(S): 80 TABLET, FILM COATED ORAL at 21:20

## 2021-10-05 RX ADMIN — HEPARIN SODIUM 5000 UNIT(S): 5000 INJECTION INTRAVENOUS; SUBCUTANEOUS at 21:20

## 2021-10-05 RX ADMIN — Medication 15 MILLIGRAM(S): at 05:54

## 2021-10-05 RX ADMIN — OXCARBAZEPINE 300 MILLIGRAM(S): 300 TABLET, FILM COATED ORAL at 17:35

## 2021-10-05 RX ADMIN — Medication 25 MILLIGRAM(S): at 17:35

## 2021-10-05 RX ADMIN — Medication 20 MILLIGRAM(S): at 05:54

## 2021-10-05 RX ADMIN — HEPARIN SODIUM 5000 UNIT(S): 5000 INJECTION INTRAVENOUS; SUBCUTANEOUS at 13:08

## 2021-10-05 NOTE — PROGRESS NOTE ADULT - SUBJECTIVE AND OBJECTIVE BOX
ANN BLANCO 71y Male  MRN#: [id number]   CODE STATUS: Full  Hospital Day: 3d  Pt is currently admitted with the primary diagnosis of: Generalized Weakness/Gait Disorder.    SUBJECTIVE  Overnight events: Patient still weak/fatigued. Otherwise no events, resting comfortably.  Subjective complaints: Denies pain, shortness of breath.                                          ----------------------------------------------------------  OBJECTIVE  PAST MEDICAL & SURGICAL HISTORY  Non-intractable epilepsy without status epilepticus, unspecified epilepsy type    Neuropathy    Hypertension, unspecified type    Type 2 diabetes mellitus with complication, without long-term current use of insulin    Anxiety    Cerebrovascular accident (CVA), unspecified mechanism    History of surgery  BROKEN LEG SURGERY    Capsular cataract of right eye                                          -----------------------------------------------------------  ALLERGIES:  No Known Allergies                                          ------------------------------------------------------------  HOME MEDICATIONS  Home Medications:  aspirin 81 mg oral tablet: 1 tab(s) orally once a day (20 Feb 2019 07:03)  busPIRone 15 mg oral tablet: 1 tab(s) orally once a day (02 Oct 2021 12:34)  enalapril 20 mg oral tablet: 1 tab(s) orally 2 times a day (02 Oct 2021 13:00)  escitalopram 20 mg oral tablet: 1 tab(s) orally 2 times a day (02 Oct 2021 13:01)  gabapentin 400 mg oral tablet: 1 tab(s) orally once a day (02 Oct 2021 12:59)  Keppra 750 mg oral tablet: 1 tab(s) orally 2 times a day (02 Oct 2021 13:03)  Lipitor 40 mg oral tablet: 1 tab(s) orally once a day (20 Feb 2019 07:03)  metFORMIN 750 mg oral tablet, extended release: 1 tab(s) orally once a day (02 Oct 2021 12:31)  metoprolol succinate 50 mg oral tablet, extended release: 1 tab(s) orally once a day (02 Oct 2021 12:33)  OXcarbazepine 300 mg oral tablet, extended release: 1 tab(s) orally once a day (02 Oct 2021 13:04)  oxybutynin 15 mg/24 hr oral tablet, extended release: 1 tab(s) orally once a day (02 Oct 2021 12:34)  Plavix 75 mg oral tablet: 1 tab(s) orally once a day (20 Feb 2019 07:03)              MEDICATIONS:  STANDING MEDICATIONS  aspirin  chewable 81 milliGRAM(s) Oral daily  atorvastatin 40 milliGRAM(s) Oral at bedtime  busPIRone 15 milliGRAM(s) Oral <User Schedule>  dextrose 40% Gel 15 Gram(s) Oral once  dextrose 5%. 1000 milliLiter(s) IV Continuous <Continuous>  dextrose 5%. 1000 milliLiter(s) IV Continuous <Continuous>  dextrose 50% Injectable 25 Gram(s) IV Push once  dextrose 50% Injectable 12.5 Gram(s) IV Push once  dextrose 50% Injectable 25 Gram(s) IV Push once  enalapril 20 milliGRAM(s) Oral two times a day  escitalopram 10 milliGRAM(s) Oral daily  glucagon  Injectable 1 milliGRAM(s) IntraMuscular once  heparin   Injectable 5000 Unit(s) SubCutaneous every 8 hours  hydrALAZINE 25 milliGRAM(s) Oral two times a day  insulin lispro (ADMELOG) corrective regimen sliding scale   SubCutaneous three times a day before meals  levETIRAcetam 750 milliGRAM(s) Oral two times a day  lidocaine 1% Injectable 10 milliLiter(s) Local Injection once  melatonin 5 milliGRAM(s) Oral at bedtime  metoprolol tartrate 50 milliGRAM(s) Oral two times a day  OXcarbazepine 300 milliGRAM(s) Oral two times a day    PRN MEDICATIONS                                          ------------------------------------------------------------  VITAL SIGNS: Last 24 Hours  T(C): 37.8 (04 Oct 2021 22:32), Max: 37.8 (04 Oct 2021 22:32)  T(F): 100.1 (04 Oct 2021 22:32), Max: 100.1 (04 Oct 2021 22:32)  HR: 82 (04 Oct 2021 22:32) (82 - 105)  BP: 139/73 (04 Oct 2021 22:32) (139/73 - 195/99)  BP(mean): --  RR: 18 (04 Oct 2021 22:32) (18 - 18)  SpO2: --    10-04-21 @ 07:01  -  10-05-21 @ 07:00  --------------------------------------------------------  IN: 120 mL / OUT: 300 mL / NET: -180 mL                                         --------------------------------------------------------------  LABS:             14.6   7.37  )-----------( 155      ( 05 Oct 2021 06:44 )             42.5     10-05    133<L>  |  96<L>  |  19  ----------------------------<  158<H>  3.5   |  19  |  0.8    Ca    8.5      05 Oct 2021 06:44  Mg     2.1     10-05    TPro  6.5  /  Alb  3.4<L>  /  TBili  1.0  /  DBili  x   /  AST  35  /  ALT  23  /  AlkPhos  56  10-05    PT/INR - ( 04 Oct 2021 11:44 )   PT: 12.80 sec;   INR: 1.11 ratio         PTT - ( 04 Oct 2021 11:44 )  PTT:33.8 sec    CARDIAC MARKERS ( 03 Oct 2021 20:00 )  x     / 0.01 ng/mL / x     / x     / x                                              -------------------------------------------------------------  RADIOLOGY:                                          --------------------------------------------------------------  PHYSICAL EXAM:  General: alert, awake, no acute distress  HEENT: atraumatic  LUNGS: clear to auscultation bilaterally, no wheezes noted  HEART: regular rate/rhythm, no murmurs/gallops  ABDOMEN: soft, nontender, mildly distended, normoactive bowel sounds  EXT: 2+ radial pulses, 4-5/5 strength in UEs, 3/5 strength in LEs  NEURO: aaox2, able to follow simple commands  SKIN: intact, no new lesions noted                                         --------------------------------------------------------------  ASSESSMENT & PLAN  Past medical history and hospital course:  71 year old male with PMHx of CVA, partial seizures ADAIR, DM, DLD, BPH, gait disorder (mainly mechanical) followed by neuro outpatient presenting to the ED for generalized weakness. Pt states over the past few weeks, he has been unable to "tie his shoelaces and put on his jeans, because it makes him feel SOB. Denies dyspnea when walking up the stairs or walking his dog. States that he feels a lot more wobbly than usual and has fallen multiple times, most recent episode last night at home.  States that he sees the neurologist Dr Bullock o/p. Says he is compliant with all his medications and lives with his son Rosalino. Has a cane for walking but does not use it.     #Generalized weakness and gait disorder  #r/o Guillain-Barré - bilateral LE weakness, recent of diarrhea  - CT Head negative on 10/2, orthostatic vitals WNL  - LP attempted 10/4 by medical team as well as Dr. Brannon (Neurology Attending), unsuccessful  - IR consulted for LP, IR team requesting at least 3-5d off Plavix prior to LP (last dose 10/4), per phone call willing to do it on Thursday, please reorder all relevant labwork/imaging for then (CSF studies, PT/INR + Platelets Wednesday PM, Lumbar X-ray)  - B12 low, TSH WNL, DR/WI  PT recs Rehab    #Episodic SOB- ACS ruled out  trops 0.02 on admission->0.03--> 0.01 / BNP 1142  Pt denies chest pain   rpt EKG   no b/l LE edema   TTE 10/03: Left ventricular ejection fraction, by visual estimation, is 50 to 55%   CXR in ED negative for acute cardiopulmonary disease       #Hx of CVA   No residual symptoms   cont ASA + Plavix     #Partial seizures  f/u keppra level AM  cont keppra @ home dose     #HTN   cont enalapril and metoprolol     #DM  A1c 6.9  -cont SS    #ADAIR  -cont CPAP @ night     #DLD  -cont atorvastatin    #BPH  #Urge incontinence   -cont oxybutinin and oxcarbazepine     *Pt winnie Madsen cna be reached at - [phone]  Pending: LP, vit B12, TSH follow up, neurology                                                                            ----------------------------------------------------  # DVT prophylaxis: Heparin subQ 5000U subQ q8h  # GI prophylaxis: N/A  # Diet: Consistent Carbs + DASH/TLC  # Activity: OOB to chair  # Code status: Full  # Disposition: remain on floor                                                                           --------------------------------------------------------  # Handoff: pending LP by IR, f/u with Neuro, MRI T/L-spine ANN BLANCO 71y Male  MRN#: [id number]   CODE STATUS: Full  Hospital Day: 3d  Pt is currently admitted with the primary diagnosis of: Generalized Weakness/Gait Disorder.    SUBJECTIVE  Overnight events: Patient still weak/fatigued. Otherwise no events, resting comfortably.  Subjective complaints: Denies pain, shortness of breath.                                          ----------------------------------------------------------  OBJECTIVE  PAST MEDICAL & SURGICAL HISTORY  Non-intractable epilepsy without status epilepticus, unspecified epilepsy type    Neuropathy    Hypertension, unspecified type    Type 2 diabetes mellitus with complication, without long-term current use of insulin    Anxiety    Cerebrovascular accident (CVA), unspecified mechanism    History of surgery  BROKEN LEG SURGERY    Capsular cataract of right eye                                          -----------------------------------------------------------  ALLERGIES:  No Known Allergies                                          ------------------------------------------------------------  HOME MEDICATIONS  Home Medications:  aspirin 81 mg oral tablet: 1 tab(s) orally once a day (20 Feb 2019 07:03)  busPIRone 15 mg oral tablet: 1 tab(s) orally once a day (02 Oct 2021 12:34)  enalapril 20 mg oral tablet: 1 tab(s) orally 2 times a day (02 Oct 2021 13:00)  escitalopram 20 mg oral tablet: 1 tab(s) orally 2 times a day (02 Oct 2021 13:01)  gabapentin 400 mg oral tablet: 1 tab(s) orally once a day (02 Oct 2021 12:59)  Keppra 750 mg oral tablet: 1 tab(s) orally 2 times a day (02 Oct 2021 13:03)  Lipitor 40 mg oral tablet: 1 tab(s) orally once a day (20 Feb 2019 07:03)  metFORMIN 750 mg oral tablet, extended release: 1 tab(s) orally once a day (02 Oct 2021 12:31)  metoprolol succinate 50 mg oral tablet, extended release: 1 tab(s) orally once a day (02 Oct 2021 12:33)  OXcarbazepine 300 mg oral tablet, extended release: 1 tab(s) orally once a day (02 Oct 2021 13:04)  oxybutynin 15 mg/24 hr oral tablet, extended release: 1 tab(s) orally once a day (02 Oct 2021 12:34)  Plavix 75 mg oral tablet: 1 tab(s) orally once a day (20 Feb 2019 07:03)              MEDICATIONS:  STANDING MEDICATIONS  aspirin  chewable 81 milliGRAM(s) Oral daily  atorvastatin 40 milliGRAM(s) Oral at bedtime  busPIRone 15 milliGRAM(s) Oral <User Schedule>  dextrose 40% Gel 15 Gram(s) Oral once  dextrose 5%. 1000 milliLiter(s) IV Continuous <Continuous>  dextrose 5%. 1000 milliLiter(s) IV Continuous <Continuous>  dextrose 50% Injectable 25 Gram(s) IV Push once  dextrose 50% Injectable 12.5 Gram(s) IV Push once  dextrose 50% Injectable 25 Gram(s) IV Push once  enalapril 20 milliGRAM(s) Oral two times a day  escitalopram 10 milliGRAM(s) Oral daily  glucagon  Injectable 1 milliGRAM(s) IntraMuscular once  heparin   Injectable 5000 Unit(s) SubCutaneous every 8 hours  hydrALAZINE 25 milliGRAM(s) Oral two times a day  insulin lispro (ADMELOG) corrective regimen sliding scale   SubCutaneous three times a day before meals  levETIRAcetam 750 milliGRAM(s) Oral two times a day  lidocaine 1% Injectable 10 milliLiter(s) Local Injection once  melatonin 5 milliGRAM(s) Oral at bedtime  metoprolol tartrate 50 milliGRAM(s) Oral two times a day  OXcarbazepine 300 milliGRAM(s) Oral two times a day    PRN MEDICATIONS                                          ------------------------------------------------------------  VITAL SIGNS: Last 24 Hours  T(C): 37.8 (04 Oct 2021 22:32), Max: 37.8 (04 Oct 2021 22:32)  T(F): 100.1 (04 Oct 2021 22:32), Max: 100.1 (04 Oct 2021 22:32)  HR: 82 (04 Oct 2021 22:32) (82 - 105)  BP: 139/73 (04 Oct 2021 22:32) (139/73 - 195/99)  BP(mean): --  RR: 18 (04 Oct 2021 22:32) (18 - 18)  SpO2: --    10-04-21 @ 07:01  -  10-05-21 @ 07:00  --------------------------------------------------------  IN: 120 mL / OUT: 300 mL / NET: -180 mL                                         --------------------------------------------------------------  LABS:             14.6   7.37  )-----------( 155      ( 05 Oct 2021 06:44 )             42.5     10-05    133<L>  |  96<L>  |  19  ----------------------------<  158<H>  3.5   |  19  |  0.8    Ca    8.5      05 Oct 2021 06:44  Mg     2.1     10-05    TPro  6.5  /  Alb  3.4<L>  /  TBili  1.0  /  DBili  x   /  AST  35  /  ALT  23  /  AlkPhos  56  10-05    PT/INR - ( 04 Oct 2021 11:44 )   PT: 12.80 sec;   INR: 1.11 ratio         PTT - ( 04 Oct 2021 11:44 )  PTT:33.8 sec    CARDIAC MARKERS ( 03 Oct 2021 20:00 )  x     / 0.01 ng/mL / x     / x     / x                                              -------------------------------------------------------------  RADIOLOGY:                                          --------------------------------------------------------------  PHYSICAL EXAM:  General: alert, awake, no acute distress  HEENT: atraumatic  LUNGS: clear to auscultation bilaterally, no wheezes noted  HEART: regular rate/rhythm, no murmurs/gallops  ABDOMEN: soft, nontender, mildly distended, normoactive bowel sounds  EXT: 2+ radial pulses, 4-5/5 strength in UEs, 3/5 strength in LEs  NEURO: aaox2, able to follow simple commands  SKIN: intact, no new lesions noted                                         --------------------------------------------------------------  ASSESSMENT & PLAN  Past medical history and hospital course:  71 year old male with PMHx of CVA, partial seizures ADAIR, DM, DLD, BPH, gait disorder (mainly mechanical) followed by neuro outpatient presenting to the ED for generalized weakness. Pt states over the past few weeks, he has been unable to "tie his shoelaces and put on his jeans, because it makes him feel SOB. Denies dyspnea when walking up the stairs or walking his dog. States that he feels a lot more wobbly than usual and has fallen multiple times, most recent episode last night at home.  States that he sees the neurologist Dr Bullock o/p. Says he is compliant with all his medications and lives with his son Rosalino. Has a cane for walking but does not use it.     #Generalized weakness and gait disorder  #r/o Guillain-Barré - bilateral LE weakness, recent of diarrhea  - CT Head negative on 10/2, orthostatic vitals WNL  - LP attempted 10/4 by medical team as well as Dr. Brannon (Neurology Attending), unsuccessful  - IR consulted for LP, IR team requesting at least 3-5d off Plavix prior to LP (last dose 10/4), per phone call willing to do it on Thursday, please reorder all relevant labwork/imaging for then (CSF studies, PT/INR + Platelets Wednesday PM, Lumbar X-ray)  - B12 low, will ordered MMA then replenish, TSH WNL, RPR pending  - PT consulted, recommending rehab    #Episodic shortness of breath - ACS ruled out  - trops 0.02 on admission->0.03--> 0.01 / BNP 1142  - patient continues to deny chest pain   - no b/l LE edema  - TTE 10/03: Left ventricular ejection fraction, by visual estimation, is 50 to 55%   - CXR in ED negative for acute cardiopulmonary disease     #Hx of CVA   No residual symptoms   cont ASA + Plavix     #Partial seizures  f/u keppra level AM  cont keppra @ home dose     #HTN   cont enalapril and metoprolol     #DM  A1c 6.9  -cont SS    #ADAIR  -cont CPAP @ night     #DLD  -cont atorvastatin    #BPH  #Urge incontinence   -cont oxybutinin and oxcarbazepine     *Pt winnie Madsen cna be reached at - [phone]  Pending: LP, vit B12, TSH follow up, neurology                                                                            ----------------------------------------------------  # DVT prophylaxis: Heparin subQ 5000U subQ q8h  # GI prophylaxis: N/A  # Diet: Consistent Carbs + DASH/TLC  # Activity: OOB to chair  # Code status: Full  # Disposition: remain on floor                                                                           --------------------------------------------------------  # Handoff: pending LP by IR, f/u with Neuro, MRI T/L-spine ANN BLANCO 71y Male  MRN#: [id number]   CODE STATUS: Full  Hospital Day: 3d  Pt is currently admitted with the primary diagnosis of: Generalized Weakness/Gait Disorder.    SUBJECTIVE  Overnight events: Patient still weak/fatigued. Otherwise no events, resting comfortably.  Subjective complaints: Denies pain, shortness of breath.                                          ----------------------------------------------------------  OBJECTIVE  PAST MEDICAL & SURGICAL HISTORY  Non-intractable epilepsy without status epilepticus, unspecified epilepsy type    Neuropathy    Hypertension, unspecified type    Type 2 diabetes mellitus with complication, without long-term current use of insulin    Anxiety    Cerebrovascular accident (CVA), unspecified mechanism    History of surgery  BROKEN LEG SURGERY    Capsular cataract of right eye                                          -----------------------------------------------------------  ALLERGIES:  No Known Allergies                                          ------------------------------------------------------------  HOME MEDICATIONS  Home Medications:  aspirin 81 mg oral tablet: 1 tab(s) orally once a day (20 Feb 2019 07:03)  busPIRone 15 mg oral tablet: 1 tab(s) orally once a day (02 Oct 2021 12:34)  enalapril 20 mg oral tablet: 1 tab(s) orally 2 times a day (02 Oct 2021 13:00)  escitalopram 20 mg oral tablet: 1 tab(s) orally 2 times a day (02 Oct 2021 13:01)  gabapentin 400 mg oral tablet: 1 tab(s) orally once a day (02 Oct 2021 12:59)  Keppra 750 mg oral tablet: 1 tab(s) orally 2 times a day (02 Oct 2021 13:03)  Lipitor 40 mg oral tablet: 1 tab(s) orally once a day (20 Feb 2019 07:03)  metFORMIN 750 mg oral tablet, extended release: 1 tab(s) orally once a day (02 Oct 2021 12:31)  metoprolol succinate 50 mg oral tablet, extended release: 1 tab(s) orally once a day (02 Oct 2021 12:33)  OXcarbazepine 300 mg oral tablet, extended release: 1 tab(s) orally once a day (02 Oct 2021 13:04)  oxybutynin 15 mg/24 hr oral tablet, extended release: 1 tab(s) orally once a day (02 Oct 2021 12:34)  Plavix 75 mg oral tablet: 1 tab(s) orally once a day (20 Feb 2019 07:03)              MEDICATIONS:  STANDING MEDICATIONS  aspirin  chewable 81 milliGRAM(s) Oral daily  atorvastatin 40 milliGRAM(s) Oral at bedtime  busPIRone 15 milliGRAM(s) Oral <User Schedule>  dextrose 40% Gel 15 Gram(s) Oral once  dextrose 5%. 1000 milliLiter(s) IV Continuous <Continuous>  dextrose 5%. 1000 milliLiter(s) IV Continuous <Continuous>  dextrose 50% Injectable 25 Gram(s) IV Push once  dextrose 50% Injectable 12.5 Gram(s) IV Push once  dextrose 50% Injectable 25 Gram(s) IV Push once  enalapril 20 milliGRAM(s) Oral two times a day  escitalopram 10 milliGRAM(s) Oral daily  glucagon  Injectable 1 milliGRAM(s) IntraMuscular once  heparin   Injectable 5000 Unit(s) SubCutaneous every 8 hours  hydrALAZINE 25 milliGRAM(s) Oral two times a day  insulin lispro (ADMELOG) corrective regimen sliding scale   SubCutaneous three times a day before meals  levETIRAcetam 750 milliGRAM(s) Oral two times a day  lidocaine 1% Injectable 10 milliLiter(s) Local Injection once  melatonin 5 milliGRAM(s) Oral at bedtime  metoprolol tartrate 50 milliGRAM(s) Oral two times a day  OXcarbazepine 300 milliGRAM(s) Oral two times a day    PRN MEDICATIONS                                          ------------------------------------------------------------  VITAL SIGNS: Last 24 Hours  T(C): 37.8 (04 Oct 2021 22:32), Max: 37.8 (04 Oct 2021 22:32)  T(F): 100.1 (04 Oct 2021 22:32), Max: 100.1 (04 Oct 2021 22:32)  HR: 82 (04 Oct 2021 22:32) (82 - 105)  BP: 139/73 (04 Oct 2021 22:32) (139/73 - 195/99)  BP(mean): --  RR: 18 (04 Oct 2021 22:32) (18 - 18)  SpO2: --    10-04-21 @ 07:01  -  10-05-21 @ 07:00  --------------------------------------------------------  IN: 120 mL / OUT: 300 mL / NET: -180 mL                                         --------------------------------------------------------------  LABS:             14.6   7.37  )-----------( 155      ( 05 Oct 2021 06:44 )             42.5     10-05    133<L>  |  96<L>  |  19  ----------------------------<  158<H>  3.5   |  19  |  0.8    Ca    8.5      05 Oct 2021 06:44  Mg     2.1     10-05    TPro  6.5  /  Alb  3.4<L>  /  TBili  1.0  /  DBili  x   /  AST  35  /  ALT  23  /  AlkPhos  56  10-05    PT/INR - ( 04 Oct 2021 11:44 )   PT: 12.80 sec;   INR: 1.11 ratio         PTT - ( 04 Oct 2021 11:44 )  PTT:33.8 sec    CARDIAC MARKERS ( 03 Oct 2021 20:00 )  x     / 0.01 ng/mL / x     / x     / x                                              -------------------------------------------------------------  RADIOLOGY:                                          --------------------------------------------------------------  PHYSICAL EXAM:  General: alert, awake, no acute distress  HEENT: atraumatic  LUNGS: clear to auscultation bilaterally, no wheezes noted  HEART: regular rate/rhythm, no murmurs/gallops  ABDOMEN: soft, nontender, mildly distended, normoactive bowel sounds  EXT: 2+ radial pulses, 4-5/5 strength in UEs, 3/5 strength in LEs  NEURO: aaox2, able to follow simple commands  SKIN: intact, no new lesions noted                                         --------------------------------------------------------------  ASSESSMENT & PLAN  Past medical history and hospital course:  71 year old male with PMHx of CVA, partial seizures ADAIR, DM, DLD, BPH, gait disorder (mainly mechanical) followed by neuro outpatient presenting to the ED for generalized weakness. Pt states over the past few weeks, he has been unable to "tie his shoelaces and put on his jeans, because it makes him feel SOB. Denies dyspnea when walking up the stairs or walking his dog. States that he feels a lot more wobbly than usual and has fallen multiple times, most recent episode last night at home.  States that he sees the neurologist Dr Bullock o/p. Says he is compliant with all his medications and lives with his son Rosalino. Has a cane for walking but does not use it.     #Generalized weakness and gait disorder  #r/o Guillain-Barré - bilateral LE weakness, recent of diarrhea  - CT Head negative on 10/2, orthostatic vitals WNL  - LP attempted 10/4 by medical team as well as Dr. Brannon (Neurology Attending), unsuccessful  - IR consulted for LP, IR team requesting at least 3-5d off Plavix prior to LP (last dose 10/4), per phone call willing to do it on Thursday, please reorder all relevant labwork/imaging for then (CSF studies, PT/INR + Platelets Wednesday PM, Lumbar X-ray)  - B12 low, will ordered MMA then replenish, TSH WNL, RPR pending  - PT consulted, recommending rehab  - ordered MRI C/T/L Spine today, patient previously refused but was amenable today, will f/u  - will f/u with Neuro    #Episodic shortness of breath - ACS ruled out  - Troponin 0.02 on admission ==> 0.03 ==> 0.01, BNP 1142  - patient continues to deny chest pain, no b/l LE edema  - TTE 10/03: Left ventricular ejection fraction, by visual estimation, is 50 to 55%   - CXR in ED negative for acute cardiopulmonary disease     #PMHx of CVA   - no residual symptoms, on Aspirin/Plavix, holding Plavix as per IR for LP    #Partial Seizures  - will f/u keppra level from this AM, continuing home dose for now    #HTN   - continuing with enalapril and metoprolol     #DM - A1c 7.0 on 10/4  - SSI for now    #ADAIR  - CPAP @ night     #DLD  - continuing with atorvastatin    #BPH  #Urge Incontinence   - continuing with oxybutynin and oxcarbazepine                                                                             ----------------------------------------------------  # DVT prophylaxis: Heparin subQ 5000U subQ q8h  # GI prophylaxis: N/A  # Diet: Consistent Carbs + DASH/TLC  # Activity: OOB to chair  # Code status: Full  # Disposition: remain on floor                                                                           --------------------------------------------------------  # Handoff: pending LP by IR, f/u with Neuro, MRI C/T/L-spine, MMA level then start aggressive B12 regimen ANN BLANCO 71y Male  MRN#: [id number]   CODE STATUS: Full  Hospital Day: 3d  Pt is currently admitted with the primary diagnosis of: Generalized Weakness/Gait Disorder.    SUBJECTIVE  Overnight events: Patient still weak/fatigued. Otherwise no events, resting comfortably.  Subjective complaints: Denies pain, shortness of breath.                                          ----------------------------------------------------------  OBJECTIVE  PAST MEDICAL & SURGICAL HISTORY  Non-intractable epilepsy without status epilepticus, unspecified epilepsy type    Neuropathy    Hypertension, unspecified type    Type 2 diabetes mellitus with complication, without long-term current use of insulin    Anxiety    Cerebrovascular accident (CVA), unspecified mechanism    History of surgery  BROKEN LEG SURGERY    Capsular cataract of right eye                                          -----------------------------------------------------------  ALLERGIES:  No Known Allergies                                          ------------------------------------------------------------  HOME MEDICATIONS  Home Medications:  aspirin 81 mg oral tablet: 1 tab(s) orally once a day (20 Feb 2019 07:03)  busPIRone 15 mg oral tablet: 1 tab(s) orally once a day (02 Oct 2021 12:34)  enalapril 20 mg oral tablet: 1 tab(s) orally 2 times a day (02 Oct 2021 13:00)  escitalopram 20 mg oral tablet: 1 tab(s) orally 2 times a day (02 Oct 2021 13:01)  gabapentin 400 mg oral tablet: 1 tab(s) orally once a day (02 Oct 2021 12:59)  Keppra 750 mg oral tablet: 1 tab(s) orally 2 times a day (02 Oct 2021 13:03)  Lipitor 40 mg oral tablet: 1 tab(s) orally once a day (20 Feb 2019 07:03)  metFORMIN 750 mg oral tablet, extended release: 1 tab(s) orally once a day (02 Oct 2021 12:31)  metoprolol succinate 50 mg oral tablet, extended release: 1 tab(s) orally once a day (02 Oct 2021 12:33)  OXcarbazepine 300 mg oral tablet, extended release: 1 tab(s) orally once a day (02 Oct 2021 13:04)  oxybutynin 15 mg/24 hr oral tablet, extended release: 1 tab(s) orally once a day (02 Oct 2021 12:34)  Plavix 75 mg oral tablet: 1 tab(s) orally once a day (20 Feb 2019 07:03)              MEDICATIONS:  STANDING MEDICATIONS  aspirin  chewable 81 milliGRAM(s) Oral daily  atorvastatin 40 milliGRAM(s) Oral at bedtime  busPIRone 15 milliGRAM(s) Oral <User Schedule>  dextrose 40% Gel 15 Gram(s) Oral once  dextrose 5%. 1000 milliLiter(s) IV Continuous <Continuous>  dextrose 5%. 1000 milliLiter(s) IV Continuous <Continuous>  dextrose 50% Injectable 25 Gram(s) IV Push once  dextrose 50% Injectable 12.5 Gram(s) IV Push once  dextrose 50% Injectable 25 Gram(s) IV Push once  enalapril 20 milliGRAM(s) Oral two times a day  escitalopram 10 milliGRAM(s) Oral daily  glucagon  Injectable 1 milliGRAM(s) IntraMuscular once  heparin   Injectable 5000 Unit(s) SubCutaneous every 8 hours  hydrALAZINE 25 milliGRAM(s) Oral two times a day  insulin lispro (ADMELOG) corrective regimen sliding scale   SubCutaneous three times a day before meals  levETIRAcetam 750 milliGRAM(s) Oral two times a day  lidocaine 1% Injectable 10 milliLiter(s) Local Injection once  melatonin 5 milliGRAM(s) Oral at bedtime  metoprolol tartrate 50 milliGRAM(s) Oral two times a day  OXcarbazepine 300 milliGRAM(s) Oral two times a day    PRN MEDICATIONS                                          ------------------------------------------------------------  VITAL SIGNS: Last 24 Hours  T(C): 37.8 (04 Oct 2021 22:32), Max: 37.8 (04 Oct 2021 22:32)  T(F): 100.1 (04 Oct 2021 22:32), Max: 100.1 (04 Oct 2021 22:32)  HR: 82 (04 Oct 2021 22:32) (82 - 105)  BP: 139/73 (04 Oct 2021 22:32) (139/73 - 195/99)  BP(mean): --  RR: 18 (04 Oct 2021 22:32) (18 - 18)  SpO2: --    10-04-21 @ 07:01  -  10-05-21 @ 07:00  --------------------------------------------------------  IN: 120 mL / OUT: 300 mL / NET: -180 mL                                         --------------------------------------------------------------  LABS:             14.6   7.37  )-----------( 155      ( 05 Oct 2021 06:44 )             42.5     10-05    133<L>  |  96<L>  |  19  ----------------------------<  158<H>  3.5   |  19  |  0.8    Ca    8.5      05 Oct 2021 06:44  Mg     2.1     10-05    TPro  6.5  /  Alb  3.4<L>  /  TBili  1.0  /  DBili  x   /  AST  35  /  ALT  23  /  AlkPhos  56  10-05    PT/INR - ( 04 Oct 2021 11:44 )   PT: 12.80 sec;   INR: 1.11 ratio         PTT - ( 04 Oct 2021 11:44 )  PTT:33.8 sec    CARDIAC MARKERS ( 03 Oct 2021 20:00 )  x     / 0.01 ng/mL / x     / x     / x                                              -------------------------------------------------------------  RADIOLOGY:                                          --------------------------------------------------------------  PHYSICAL EXAM:  General: alert, awake, no acute distress  HEENT: atraumatic  LUNGS: clear to auscultation bilaterally, no wheezes noted  HEART: regular rate/rhythm, no murmurs/gallops  ABDOMEN: soft, nontender, mildly distended, normoactive bowel sounds  EXT: 2+ radial pulses, 4-5/5 strength in UEs, 3/5 strength in LEs, +2/4 patellar reflex bilaterally  NEURO: aaox2, able to follow simple commands  SKIN: intact, no new lesions noted                                         --------------------------------------------------------------  ASSESSMENT & PLAN  Past medical history and hospital course:  71 year old male with PMHx of CVA, partial seizures ADAIR, DM, DLD, BPH, gait disorder (mainly mechanical) followed by neuro outpatient presenting to the ED for generalized weakness. Pt states over the past few weeks, he has been unable to "tie his shoelaces and put on his jeans, because it makes him feel SOB. Denies dyspnea when walking up the stairs or walking his dog. States that he feels a lot more wobbly than usual and has fallen multiple times, most recent episode last night at home.  States that he sees the neurologist Dr Bullock o/p. Says he is compliant with all his medications and lives with his son Rosalino. Has a cane for walking but does not use it.     #Generalized weakness and gait disorder  #r/o Guillain-Barré - bilateral LE weakness, recent of diarrhea  - CT Head negative on 10/2, orthostatic vitals WNL  - LP attempted 10/4 by medical team as well as Dr. Brannon (Neurology Attending), unsuccessful  - IR consulted for LP, IR team requesting at least 3-5d off Plavix prior to LP (last dose 10/4), per phone call willing to do it on Thursday now, please reorder all relevant labwork/imaging for then (CSF studies, PT/INR + Platelets Wednesday PM, Lumbar X-ray)  - B12 low, will order MMA then replenish, TSH WNL, RPR pending  - PT consulted, recommending rehab  - ordered MRI C/T/L Spine today, patient previously refused but was amenable today, will f/u  - will f/u with Neuro    #Episodic shortness of breath - ACS ruled out  - Troponin 0.02 on admission ==> 0.03 ==> 0.01, BNP 1142  - patient continues to deny chest pain, no b/l LE edema  - TTE 10/03: Left ventricular ejection fraction, by visual estimation, is 50 to 55%   - CXR in ED negative for acute cardiopulmonary disease     #PMHx of CVA   - no residual symptoms, on Aspirin/Plavix, holding Plavix as per IR for LP    #Partial Seizures  - will f/u keppra level from this AM, continuing home dose for now    #HTN   - continuing with enalapril and metoprolol     #DM - A1c 7.0 on 10/4  - SSI for now    #ADAIR  - CPAP @ night     #DLD  - continuing with atorvastatin    #BPH  #Urge Incontinence   - continuing with oxybutynin and oxcarbazepine                                                                             ----------------------------------------------------  # DVT prophylaxis: Heparin subQ 5000U subQ q8h  # GI prophylaxis: N/A  # Diet: Consistent Carbs + DASH/TLC  # Activity: OOB to chair  # Code status: Full  # Disposition: remain on floor                                                                           --------------------------------------------------------  # Handoff: pending LP by IR, f/u with Neuro, MRI C/T/L-spine, MMA level then start aggressive B12 regimen

## 2021-10-05 NOTE — PROGRESS NOTE ADULT - ATTENDING COMMENTS
71 year old male with PMHx of CVA, partial seizures ADAIR, DM, DLD, BPH, gait disorder (mainly mechanical) followed by neuro outpatient presenting to the ED for generalized weakness. Pt states over the past few weeks, he has been unable to "tie his shoelaces and put on his jeans, because it makes him feel SOB. Denies dyspnea when walking up the stairs or walking his dog. States that he feels a lot more wobbly than usual and has fallen multiple times, most recent episode last night at home.  States that he sees the neurologist Dr Bullock o/p. Says he is compliant with all his medications and lives with his son Rosalino. Has a cane for walking but does not use it.     #Generalized weakness and gait disorder  #r/o Guillain-Barré - bilateral LE weakness, recent of diarrhea  - CT Head negative on 10/2, orthostatic vitals WNL  - LP attempted 10/4 by medical team as well as Dr. Brannon (Neurology Attending), unsuccessful  - IR consulted for LP, IR team requesting at least 3-5d off Plavix prior to LP (last dose 10/4), per phone call willing to do it on Thursday now, please reorder all relevant labwork/imaging for then (CSF studies, PT/INR + Platelets Wednesday PM, Lumbar X-ray)  - B12 low, will order MMA then replenish, TSH WNL, RPR pending  - PT consulted, recommending rehab  - ordered MRI C/T/L Spine today, patient previously refused but was amenable today, will f/u  - will f/u with Neuro    #Episodic shortness of breath - ACS ruled out  - Troponin 0.02 on admission ==> 0.03 ==> 0.01, BNP 1142  - patient continues to deny chest pain, no b/l LE edema  - TTE 10/03: Left ventricular ejection fraction, by visual estimation, is 50 to 55%   - CXR in ED negative for acute cardiopulmonary disease     #PMHx of CVA   - no residual symptoms, on Aspirin/Plavix, holding Plavix as per IR for LP    #Partial Seizures  - will f/u keppra level from this AM, continuing home dose for now    #HTN   - continuing with enalapril and metoprolol     #DM - A1c 7.0 on 10/4  - SSI for now    #ADAIR  - CPAP @ night     #DLD  - continuing with atorvastatin    #BPH  #Urge Incontinence   - continuing with oxybutynin and oxcarbazepine                                                                     DVT prophylaxis: Heparin subQ 5000U subQ q8h  Diet: Consistent Carbs + DASH/TLC  Code status: Full    Progress Note Handoff:                                                                          pending LP by IR, f/u with Neuro, MRI C/T/L-spine, MMA level then start aggressive B12 regimen  Dispo: STR once work up negative 71 year old male with PMHx of CVA, partial seizures ADAIR, DM, DLD, BPH, gait disorder (mainly mechanical) followed by neuro outpatient presenting to the ED for generalized weakness. Pt states over the past few weeks, he has been unable to "tie his shoelaces and put on his jeans, because it makes him feel SOB. Denies dyspnea when walking up the stairs or walking his dog. States that he feels a lot more wobbly than usual and has fallen multiple times, most recent episode last night at home.  States that he sees the neurologist Dr Bullock o/p. Says he is compliant with all his medications and lives with his son Rosalino. Has a cane for walking but does not use it.     #Generalized weakness and gait disorder  #r/o Guillain-Barré - bilateral LE weakness, recent of diarrhea  - CT Head negative on 10/2, orthostatic vitals WNL  - LP attempted 10/4 by medical team as well as Dr. Brannon (Neurology Attending), unsuccessful  - IR consulted for LP, IR team requesting at least 3-5d off Plavix prior to LP (last dose 10/4), per phone call willing to do it on Thursday now, please reorder all relevant labwork/imaging for then (CSF studies, PT/INR + Platelets Wednesday PM, Lumbar X-ray)  - B12 low, will order MMA then replenish, TSH WNL, RPR pending  - PT consulted, recommending rehab  - ordered MRI C/T/L Spine today, patient previously refused but was amenable today, will f/u  - will f/u with Neuro    #Episodic shortness of breath - ACS ruled out  - Troponin 0.02 on admission ==> 0.03 ==> 0.01, BNP 1142  - patient continues to deny chest pain, no b/l LE edema  - TTE 10/03: Left ventricular ejection fraction, by visual estimation, is 50 to 55%   - CXR in ED negative for acute cardiopulmonary disease     #PMHx of CVA   - no residual symptoms, on Aspirin/Plavix, holding Plavix as per IR for LP    #Partial Seizures  - will f/u keppra level from this AM, continuing home dose for now    #HTN   - continuing with enalapril and metoprolol     #DM - A1c 7.0 on 10/4  - SSI for now    #ADAIR  - CPAP @ night     #DLD  - continuing with atorvastatin    #BPH  #Urge Incontinence   - continuing with oxybutynin and oxcarbazepine                                                                     DVT prophylaxis: Heparin subQ 5000U subQ q8h  Diet: Consistent Carbs + DASH/TLC  Code status: Full    Progress Note Handoff:                                                                          pending LP by IR, f/u with Neuro, MRI C/T/L-spine, MMA level then start aggressive B12 regimen  Dispo: STR once work up complete

## 2021-10-06 LAB
ALBUMIN SERPL ELPH-MCNC: 3.4 G/DL — LOW (ref 3.5–5.2)
ALP SERPL-CCNC: 57 U/L — SIGNIFICANT CHANGE UP (ref 30–115)
ALT FLD-CCNC: 47 U/L — HIGH (ref 0–41)
ANION GAP SERPL CALC-SCNC: 16 MMOL/L — HIGH (ref 7–14)
APTT BLD: 31.4 SEC — SIGNIFICANT CHANGE UP (ref 27–39.2)
AST SERPL-CCNC: 51 U/L — HIGH (ref 0–41)
BASOPHILS # BLD AUTO: 0.04 K/UL — SIGNIFICANT CHANGE UP (ref 0–0.2)
BASOPHILS NFR BLD AUTO: 0.5 % — SIGNIFICANT CHANGE UP (ref 0–1)
BILIRUB SERPL-MCNC: 0.8 MG/DL — SIGNIFICANT CHANGE UP (ref 0.2–1.2)
BUN SERPL-MCNC: 19 MG/DL — SIGNIFICANT CHANGE UP (ref 10–20)
CALCIUM SERPL-MCNC: 8.4 MG/DL — LOW (ref 8.5–10.1)
CHLORIDE SERPL-SCNC: 99 MMOL/L — SIGNIFICANT CHANGE UP (ref 98–110)
CO2 SERPL-SCNC: 20 MMOL/L — SIGNIFICANT CHANGE UP (ref 17–32)
CREAT SERPL-MCNC: 0.8 MG/DL — SIGNIFICANT CHANGE UP (ref 0.7–1.5)
EOSINOPHIL # BLD AUTO: 0.22 K/UL — SIGNIFICANT CHANGE UP (ref 0–0.7)
EOSINOPHIL NFR BLD AUTO: 3 % — SIGNIFICANT CHANGE UP (ref 0–8)
GLUCOSE BLDC GLUCOMTR-MCNC: 118 MG/DL — HIGH (ref 70–99)
GLUCOSE BLDC GLUCOMTR-MCNC: 159 MG/DL — HIGH (ref 70–99)
GLUCOSE BLDC GLUCOMTR-MCNC: 190 MG/DL — HIGH (ref 70–99)
GLUCOSE BLDC GLUCOMTR-MCNC: 210 MG/DL — HIGH (ref 70–99)
GLUCOSE SERPL-MCNC: 183 MG/DL — HIGH (ref 70–99)
HCT VFR BLD CALC: 44.1 % — SIGNIFICANT CHANGE UP (ref 42–52)
HGB BLD-MCNC: 14.9 G/DL — SIGNIFICANT CHANGE UP (ref 14–18)
IMM GRANULOCYTES NFR BLD AUTO: 0.4 % — HIGH (ref 0.1–0.3)
INR BLD: 0.94 RATIO — SIGNIFICANT CHANGE UP (ref 0.65–1.3)
LEVETIRACETAM SERPL-MCNC: 5.8 UG/ML — LOW (ref 10–40)
LYMPHOCYTES # BLD AUTO: 1.42 K/UL — SIGNIFICANT CHANGE UP (ref 1.2–3.4)
LYMPHOCYTES # BLD AUTO: 19.1 % — LOW (ref 20.5–51.1)
MAGNESIUM SERPL-MCNC: 2.1 MG/DL — SIGNIFICANT CHANGE UP (ref 1.8–2.4)
MCHC RBC-ENTMCNC: 30.7 PG — SIGNIFICANT CHANGE UP (ref 27–31)
MCHC RBC-ENTMCNC: 33.8 G/DL — SIGNIFICANT CHANGE UP (ref 32–37)
MCV RBC AUTO: 90.9 FL — SIGNIFICANT CHANGE UP (ref 80–94)
MONOCYTES # BLD AUTO: 1.28 K/UL — HIGH (ref 0.1–0.6)
MONOCYTES NFR BLD AUTO: 17.2 % — HIGH (ref 1.7–9.3)
NEUTROPHILS # BLD AUTO: 4.44 K/UL — SIGNIFICANT CHANGE UP (ref 1.4–6.5)
NEUTROPHILS NFR BLD AUTO: 59.8 % — SIGNIFICANT CHANGE UP (ref 42.2–75.2)
NRBC # BLD: 0 /100 WBCS — SIGNIFICANT CHANGE UP (ref 0–0)
PLATELET # BLD AUTO: 176 K/UL — SIGNIFICANT CHANGE UP (ref 130–400)
POTASSIUM SERPL-MCNC: 3.7 MMOL/L — SIGNIFICANT CHANGE UP (ref 3.5–5)
POTASSIUM SERPL-SCNC: 3.7 MMOL/L — SIGNIFICANT CHANGE UP (ref 3.5–5)
PROT SERPL-MCNC: 6.5 G/DL — SIGNIFICANT CHANGE UP (ref 6–8)
PROTHROM AB SERPL-ACNC: 10.8 SEC — SIGNIFICANT CHANGE UP (ref 9.95–12.87)
RBC # BLD: 4.85 M/UL — SIGNIFICANT CHANGE UP (ref 4.7–6.1)
RBC # FLD: 12.5 % — SIGNIFICANT CHANGE UP (ref 11.5–14.5)
SODIUM SERPL-SCNC: 135 MMOL/L — SIGNIFICANT CHANGE UP (ref 135–146)
WBC # BLD: 7.43 K/UL — SIGNIFICANT CHANGE UP (ref 4.8–10.8)
WBC # FLD AUTO: 7.43 K/UL — SIGNIFICANT CHANGE UP (ref 4.8–10.8)

## 2021-10-06 PROCEDURE — 72100 X-RAY EXAM L-S SPINE 2/3 VWS: CPT | Mod: 26

## 2021-10-06 PROCEDURE — 72141 MRI NECK SPINE W/O DYE: CPT | Mod: 26

## 2021-10-06 PROCEDURE — 99222 1ST HOSP IP/OBS MODERATE 55: CPT

## 2021-10-06 PROCEDURE — 72146 MRI CHEST SPINE W/O DYE: CPT | Mod: 26

## 2021-10-06 PROCEDURE — 99232 SBSQ HOSP IP/OBS MODERATE 35: CPT

## 2021-10-06 PROCEDURE — 93010 ELECTROCARDIOGRAM REPORT: CPT

## 2021-10-06 RX ADMIN — Medication 20 MILLIGRAM(S): at 06:11

## 2021-10-06 RX ADMIN — Medication 25 MILLIGRAM(S): at 06:11

## 2021-10-06 RX ADMIN — OXCARBAZEPINE 300 MILLIGRAM(S): 300 TABLET, FILM COATED ORAL at 18:15

## 2021-10-06 RX ADMIN — HEPARIN SODIUM 5000 UNIT(S): 5000 INJECTION INTRAVENOUS; SUBCUTANEOUS at 21:02

## 2021-10-06 RX ADMIN — Medication 0: at 18:16

## 2021-10-06 RX ADMIN — Medication 5 MILLIGRAM(S): at 21:02

## 2021-10-06 RX ADMIN — Medication 15 MILLIGRAM(S): at 06:11

## 2021-10-06 RX ADMIN — Medication 4: at 12:04

## 2021-10-06 RX ADMIN — Medication 50 MILLIGRAM(S): at 06:11

## 2021-10-06 RX ADMIN — LEVETIRACETAM 750 MILLIGRAM(S): 250 TABLET, FILM COATED ORAL at 18:15

## 2021-10-06 RX ADMIN — Medication 50 MILLIGRAM(S): at 18:17

## 2021-10-06 RX ADMIN — LEVETIRACETAM 750 MILLIGRAM(S): 250 TABLET, FILM COATED ORAL at 06:11

## 2021-10-06 RX ADMIN — OXCARBAZEPINE 300 MILLIGRAM(S): 300 TABLET, FILM COATED ORAL at 06:11

## 2021-10-06 RX ADMIN — ATORVASTATIN CALCIUM 40 MILLIGRAM(S): 80 TABLET, FILM COATED ORAL at 21:02

## 2021-10-06 RX ADMIN — HEPARIN SODIUM 5000 UNIT(S): 5000 INJECTION INTRAVENOUS; SUBCUTANEOUS at 06:11

## 2021-10-06 RX ADMIN — Medication 25 MILLIGRAM(S): at 18:15

## 2021-10-06 RX ADMIN — ESCITALOPRAM OXALATE 10 MILLIGRAM(S): 10 TABLET, FILM COATED ORAL at 12:05

## 2021-10-06 RX ADMIN — HEPARIN SODIUM 5000 UNIT(S): 5000 INJECTION INTRAVENOUS; SUBCUTANEOUS at 15:14

## 2021-10-06 RX ADMIN — PREGABALIN 1000 MICROGRAM(S): 225 CAPSULE ORAL at 21:02

## 2021-10-06 RX ADMIN — Medication 81 MILLIGRAM(S): at 12:05

## 2021-10-06 RX ADMIN — Medication 2: at 08:03

## 2021-10-06 RX ADMIN — Medication 20 MILLIGRAM(S): at 18:17

## 2021-10-06 NOTE — CONSULT NOTE ADULT - ASSESSMENT
impression:     71 Y M with PMH of CVA, partial seizures ADAIR, DM, DLD, BPH, gait disorder (mainly mechanical) followed by neuro outpatient presenting to the ED for generalized weakness. PE significant for mild weakness in lower extremities       plan:  physical therapy    MRI spine pending   LP pending for friday   attending note to follow

## 2021-10-06 NOTE — PROGRESS NOTE ADULT - ASSESSMENT
71 year old male with PMHx of CVA, partial seizures ADAIR, DM, DLD, BPH, gait disorder (mainly mechanical) followed by neuro outpatient presenting to the ED for generalized weakness. Pt states over the past few weeks, he has been unable to "tie his shoelaces and put on his jeans, because it makes him feel SOB. Denies dyspnea when walking up the stairs or walking his dog. States that he feels a lot more wobbly than usual and has fallen multiple times, most recent episode last night at home.  States that he sees the neurologist Dr Bullock o/p. Says he is compliant with all his medications and lives with his son Rosalino. Has a cane for walking but does not use it.     #Generalized weakness and gait disorder  #r/o Guillain-Barré - bilateral LE weakness, recent of diarrhea  - CT Head negative on 10/2, orthostatic vitals WNL  - LP attempted 10/4 by medical team as well as Dr. Brannon (Neurology Attending), unsuccessful  - IR consulted for LP, IR team requesting at least 3-5d off Plavix prior to LP (last dose 10/4), per phone call willing to do it on Thursday now, please reorder all relevant labwork/imaging for then (CSF studies, PT/INR + Platelets Wednesday PM, Lumbar X-ray)- ordered  - B12 low,f/u MMA then replenish, TSH WNL, RPR NG  - PT consulted, recommending rehab  - f/u MRI C/T/L Spine w/o Con  - will f/u with Neuro    #Episodic shortness of breath - ACS ruled out  - Troponin 0.02 on admission ==> 0.03 ==> 0.01, BNP 1142  - patient continues to deny chest pain, no b/l LE edema  - TTE 10/03: Left ventricular ejection fraction, by visual estimation, is 50 to 55%   - CXR in ED negative for acute cardiopulmonary disease     #PMHx of CVA   - no residual symptoms, on Aspirin/Plavix, holding Plavix as per IR for LP    #Partial Seizures  - keppra level 5.8  - f/u Neuro recs  - continuing home dose for now    #HTN   - continuing with enalapril and metoprolol     #DM   - A1c 7.0 on 10/4  - SSI for now    #ADAIR  - CPAP @ night     #DLD  - continuing with atorvastatin    #BPH  #Urge Incontinence   - continuing with oxybutynin and oxcarbazepine                                                                             ----------------------------------------------------  # DVT prophylaxis: Heparin subQ 5000U subQ q8h  # GI prophylaxis: N/A  # Diet: Consistent Carbs + DASH/TLC  # Activity: OOB to chair  # Code status: Full  # Disposition: remain on floor                                                                           --------------------------------------------------------  # Handoff: pending LP by IR, f/u with Neuro, MRI C/T/L-spine, MMA level then start aggressive B12 regimen, f/u 8 pm labs for IR LP tm and lumbar xr

## 2021-10-06 NOTE — CONSULT NOTE ADULT - ATTENDING COMMENTS
Patient seen and examined and agree with above except as noted. Patients history, notes, labs, imaging, vitals and meds reviewed personally.  Patent appears better today then on prior days.  He is able to give his history with Dr. Bullock his neurologist.  I reviewed notes form his out patient neurologist and patient was complaining of gait difficulties in April 2021 and using a cane for ambulation.  His exam shows weakness and some sensory loss in the LE b/l which is likely related to his known lumbar spine disease.  No further imaging is needed and would have PT/OT/Physiatry see him    Plan  1. No further neurological imaging needed at this time  2. PT/OT/ Rehab  3. Continue AED's  4. Follow up with neurology as out patient (Dr. Bullock)

## 2021-10-06 NOTE — CONSULT NOTE ADULT - SUBJECTIVE AND OBJECTIVE BOX
Neurology Consult    Patient is a 71y old  Male who presents with a chief complaint of Generalized Weakness, Gait Disorder (05 Oct 2021 11:13)      HPI:  Pt with partial memory loss, information obtained from chart review.    71 Y M with PMH of CVA, partial seizures ADAIR, DM, DLD, BPH, gait disorder (mainly mechanical) followed by neuro outpatient presenting to the ED for generalized weakness. Pt states over the past few weeks, he has been unable to "tie his shoelaces and put on his jeans, because it makes him feel SOB. Denies dyspnea when walking up the stairs or walking his dog. States that he feels a lot more wobbly than usual and has fallen multiple times, most recent episode last night at home.  States that he sees the neurologist Dr Bullock o/gómez. Says he is compliant with all his medications and lives with his son Rosalino.    On my interview, pt answers "I don't remember to most questions"    Denies chest pain, orthopnea, PND, SOB on exertion, abdominal pain, weight loss, loss of appetite, LOC, syncope, trauma.     Vitals signifcant for elevated BP in the ED likely due to medication interruption. Labs significant for trop 0.02. EKG with LVH, RBBB, T wave inversions. Pt states he has seen a cardiologist before but does not recall who or when. (02 Oct 2021 10:07)      PAST MEDICAL & SURGICAL HISTORY:  Nonintractable epilepsy without status epilepticus, unspecified epilepsy type    Neuropathy    Hypertension, unspecified type    Type 2 diabetes mellitus with complication, without long-term current use of insulin    Anxiety    Cerebrovascular accident (CVA), unspecified mechanism    History of surgery  BROKEN LEG SURGERY    Capsular cataract of right eye        Allergies    No Known Allergies    Intolerances        MEDICATIONS  (STANDING):  aspirin  chewable 81 milliGRAM(s) Oral daily  atorvastatin 40 milliGRAM(s) Oral at bedtime  busPIRone 15 milliGRAM(s) Oral <User Schedule>  cyanocobalamin 1000 MICROGram(s) Oral <User Schedule>  dextrose 40% Gel 15 Gram(s) Oral once  dextrose 5%. 1000 milliLiter(s) (50 mL/Hr) IV Continuous <Continuous>  dextrose 5%. 1000 milliLiter(s) (100 mL/Hr) IV Continuous <Continuous>  dextrose 50% Injectable 25 Gram(s) IV Push once  dextrose 50% Injectable 12.5 Gram(s) IV Push once  dextrose 50% Injectable 25 Gram(s) IV Push once  enalapril 20 milliGRAM(s) Oral two times a day  escitalopram 10 milliGRAM(s) Oral daily  glucagon  Injectable 1 milliGRAM(s) IntraMuscular once  heparin   Injectable 5000 Unit(s) SubCutaneous every 8 hours  hydrALAZINE 25 milliGRAM(s) Oral two times a day  insulin lispro (ADMELOG) corrective regimen sliding scale   SubCutaneous three times a day before meals  levETIRAcetam 750 milliGRAM(s) Oral two times a day  lidocaine 1% Injectable 10 milliLiter(s) Local Injection once  melatonin 5 milliGRAM(s) Oral at bedtime  metoprolol tartrate 50 milliGRAM(s) Oral two times a day  OXcarbazepine 300 milliGRAM(s) Oral two times a day    MEDICATIONS  (PRN):      Review of systems:    Constitutional: as per HPI  Neurological: As per HPI      Vital Signs Last 24 Hrs  T(C): 37.2 (05 Oct 2021 20:43), Max: 37.2 (05 Oct 2021 20:43)  T(F): 99 (05 Oct 2021 20:43), Max: 99 (05 Oct 2021 20:43)  HR: 88 (05 Oct 2021 20:43) (88 - 96)  BP: 156/81 (05 Oct 2021 20:43) (156/81 - 166/83)  RR: 18 (05 Oct 2021 20:43) (18 - 18)      Examination:  General:  Appearance is consistent with chronologic age.  No abnormal facies.  Gross skin survey within normal limits.    Cognitive/Language:  The patient is oriented to person, place, time and date.  Recent and remote memory intact.  Fund of knowledge is intact and normal.  Language with normal repetition, comprehension and naming.  Nondysarthric.    Eyes: intact VA, VFF.  EOMI w/o nystagmus, skew or reported double vision.   No ptosis/weakness of eyelid closure.    Face:  Facial sensation normal V1 - 3, no facial asymmetry.    Ears/Nose/Throat:  Hearing grossly intact b/l.  Palate elevates midline.  Tongue and uvula midline.   Motor examination:   Normal tone, bulk and range of motion.  No tenderness, twitching, tremors or involuntary movements.  Formal Muscle Strength Testin/5 LLE; 4+/5 RLE.  5/5 BLE.  No observable drift.  Sensory examination:   Intact to light touch in all extremities.  Cerebellum:   FTN intact with normal SANTI in all limbs.  gait deferred       Labs:   CBC Full  -  ( 05 Oct 2021 06:44 )  WBC Count : 7.37 K/uL  RBC Count : 4.77 M/uL  Hemoglobin : 14.6 g/dL  Hematocrit : 42.5 %  Platelet Count - Automated : 155 K/uL  Mean Cell Volume : 89.1 fL  Mean Cell Hemoglobin : 30.6 pg  Mean Cell Hemoglobin Concentration : 34.4 g/dL  Auto Neutrophil # : 4.53 K/uL  Auto Lymphocyte # : 1.26 K/uL  Auto Monocyte # : 1.39 K/uL  Auto Eosinophil # : 0.12 K/uL  Auto Basophil # : 0.04 K/uL  Auto Neutrophil % : 61.5 %  Auto Lymphocyte % : 17.1 %  Auto Monocyte % : 18.9 %  Auto Eosinophil % : 1.6 %  Auto Basophil % : 0.5 %    10-05    133<L>  |  96<L>  |  19  ----------------------------<  158<H>  3.5   |  19  |  0.8    Ca    8.5      05 Oct 2021 06:44  Mg     2.1     10-05    TPro  6.5  /  Alb  3.4<L>  /  TBili  1.0  /  DBili  x   /  AST  35  /  ALT  23  /  AlkPhos  56  10-05    LIVER FUNCTIONS - ( 05 Oct 2021 06:44 )  Alb: 3.4 g/dL / Pro: 6.5 g/dL / ALK PHOS: 56 U/L / ALT: 23 U/L / AST: 35 U/L / GGT: x           PT/INR - ( 04 Oct 2021 11:44 )   PT: 12.80 sec;   INR: 1.11 ratio         PTT - ( 04 Oct 2021 11:44 )  PTT:33.8 sec        [facility]  [facility]     10-06-21 @ 00:39    < from: CT Head No Cont (10.02.21 @ 11:35) >  IMPRESSION:    1.  No evidence of acute intracranial hemorrhage.    2.  Lacunar infarcts within the right frontal lobe and genu of the corpus callosum of indeterminate age.    3.  Chronic microvascular changes and a chronic lacunar infarct within the right corona radiata.    < end of copied text >

## 2021-10-07 LAB
ALBUMIN SERPL ELPH-MCNC: 3.4 G/DL — LOW (ref 3.5–5.2)
ALP SERPL-CCNC: 67 U/L — SIGNIFICANT CHANGE UP (ref 30–115)
ALT FLD-CCNC: 58 U/L — HIGH (ref 0–41)
ANION GAP SERPL CALC-SCNC: 17 MMOL/L — HIGH (ref 7–14)
AST SERPL-CCNC: 55 U/L — HIGH (ref 0–41)
BASOPHILS # BLD AUTO: 0.04 K/UL — SIGNIFICANT CHANGE UP (ref 0–0.2)
BASOPHILS NFR BLD AUTO: 0.6 % — SIGNIFICANT CHANGE UP (ref 0–1)
BILIRUB SERPL-MCNC: 0.7 MG/DL — SIGNIFICANT CHANGE UP (ref 0.2–1.2)
BUN SERPL-MCNC: 21 MG/DL — HIGH (ref 10–20)
CALCIUM SERPL-MCNC: 8.7 MG/DL — SIGNIFICANT CHANGE UP (ref 8.5–10.1)
CHLORIDE SERPL-SCNC: 99 MMOL/L — SIGNIFICANT CHANGE UP (ref 98–110)
CO2 SERPL-SCNC: 20 MMOL/L — SIGNIFICANT CHANGE UP (ref 17–32)
CREAT SERPL-MCNC: 0.8 MG/DL — SIGNIFICANT CHANGE UP (ref 0.7–1.5)
EOSINOPHIL # BLD AUTO: 0.22 K/UL — SIGNIFICANT CHANGE UP (ref 0–0.7)
EOSINOPHIL NFR BLD AUTO: 3.5 % — SIGNIFICANT CHANGE UP (ref 0–8)
GLUCOSE BLDC GLUCOMTR-MCNC: 119 MG/DL — HIGH (ref 70–99)
GLUCOSE BLDC GLUCOMTR-MCNC: 151 MG/DL — HIGH (ref 70–99)
GLUCOSE BLDC GLUCOMTR-MCNC: 162 MG/DL — HIGH (ref 70–99)
GLUCOSE BLDC GLUCOMTR-MCNC: 199 MG/DL — HIGH (ref 70–99)
GLUCOSE SERPL-MCNC: 163 MG/DL — HIGH (ref 70–99)
HCT VFR BLD CALC: 42.8 % — SIGNIFICANT CHANGE UP (ref 42–52)
HGB BLD-MCNC: 14.3 G/DL — SIGNIFICANT CHANGE UP (ref 14–18)
IMM GRANULOCYTES NFR BLD AUTO: 0.5 % — HIGH (ref 0.1–0.3)
LYMPHOCYTES # BLD AUTO: 1.49 K/UL — SIGNIFICANT CHANGE UP (ref 1.2–3.4)
LYMPHOCYTES # BLD AUTO: 23.8 % — SIGNIFICANT CHANGE UP (ref 20.5–51.1)
MAGNESIUM SERPL-MCNC: 2.1 MG/DL — SIGNIFICANT CHANGE UP (ref 1.8–2.4)
MCHC RBC-ENTMCNC: 30.4 PG — SIGNIFICANT CHANGE UP (ref 27–31)
MCHC RBC-ENTMCNC: 33.4 G/DL — SIGNIFICANT CHANGE UP (ref 32–37)
MCV RBC AUTO: 91.1 FL — SIGNIFICANT CHANGE UP (ref 80–94)
MONOCYTES # BLD AUTO: 0.91 K/UL — HIGH (ref 0.1–0.6)
MONOCYTES NFR BLD AUTO: 14.6 % — HIGH (ref 1.7–9.3)
NEUTROPHILS # BLD AUTO: 3.56 K/UL — SIGNIFICANT CHANGE UP (ref 1.4–6.5)
NEUTROPHILS NFR BLD AUTO: 57 % — SIGNIFICANT CHANGE UP (ref 42.2–75.2)
NRBC # BLD: 0 /100 WBCS — SIGNIFICANT CHANGE UP (ref 0–0)
PLATELET # BLD AUTO: 194 K/UL — SIGNIFICANT CHANGE UP (ref 130–400)
POTASSIUM SERPL-MCNC: 3.9 MMOL/L — SIGNIFICANT CHANGE UP (ref 3.5–5)
POTASSIUM SERPL-SCNC: 3.9 MMOL/L — SIGNIFICANT CHANGE UP (ref 3.5–5)
PROT SERPL-MCNC: 6.7 G/DL — SIGNIFICANT CHANGE UP (ref 6–8)
RBC # BLD: 4.7 M/UL — SIGNIFICANT CHANGE UP (ref 4.7–6.1)
RBC # FLD: 12.5 % — SIGNIFICANT CHANGE UP (ref 11.5–14.5)
SODIUM SERPL-SCNC: 136 MMOL/L — SIGNIFICANT CHANGE UP (ref 135–146)
WBC # BLD: 6.25 K/UL — SIGNIFICANT CHANGE UP (ref 4.8–10.8)
WBC # FLD AUTO: 6.25 K/UL — SIGNIFICANT CHANGE UP (ref 4.8–10.8)

## 2021-10-07 PROCEDURE — 99232 SBSQ HOSP IP/OBS MODERATE 35: CPT

## 2021-10-07 RX ORDER — CLOPIDOGREL BISULFATE 75 MG/1
75 TABLET, FILM COATED ORAL DAILY
Refills: 0 | Status: DISCONTINUED | OUTPATIENT
Start: 2021-10-07 | End: 2021-10-12

## 2021-10-07 RX ADMIN — Medication 25 MILLIGRAM(S): at 06:10

## 2021-10-07 RX ADMIN — Medication 2: at 07:53

## 2021-10-07 RX ADMIN — OXCARBAZEPINE 300 MILLIGRAM(S): 300 TABLET, FILM COATED ORAL at 06:09

## 2021-10-07 RX ADMIN — Medication 15 MILLIGRAM(S): at 06:10

## 2021-10-07 RX ADMIN — Medication 20 MILLIGRAM(S): at 17:24

## 2021-10-07 RX ADMIN — Medication 81 MILLIGRAM(S): at 11:46

## 2021-10-07 RX ADMIN — HEPARIN SODIUM 5000 UNIT(S): 5000 INJECTION INTRAVENOUS; SUBCUTANEOUS at 22:21

## 2021-10-07 RX ADMIN — Medication 5 MILLIGRAM(S): at 22:21

## 2021-10-07 RX ADMIN — Medication 50 MILLIGRAM(S): at 06:10

## 2021-10-07 RX ADMIN — PREGABALIN 1000 MICROGRAM(S): 225 CAPSULE ORAL at 22:22

## 2021-10-07 RX ADMIN — LEVETIRACETAM 750 MILLIGRAM(S): 250 TABLET, FILM COATED ORAL at 06:09

## 2021-10-07 RX ADMIN — ESCITALOPRAM OXALATE 10 MILLIGRAM(S): 10 TABLET, FILM COATED ORAL at 11:46

## 2021-10-07 RX ADMIN — Medication 25 MILLIGRAM(S): at 17:24

## 2021-10-07 RX ADMIN — OXCARBAZEPINE 300 MILLIGRAM(S): 300 TABLET, FILM COATED ORAL at 17:24

## 2021-10-07 RX ADMIN — Medication 2: at 17:22

## 2021-10-07 RX ADMIN — Medication 50 MILLIGRAM(S): at 17:21

## 2021-10-07 RX ADMIN — Medication 20 MILLIGRAM(S): at 06:10

## 2021-10-07 RX ADMIN — LEVETIRACETAM 750 MILLIGRAM(S): 250 TABLET, FILM COATED ORAL at 17:24

## 2021-10-07 RX ADMIN — ATORVASTATIN CALCIUM 40 MILLIGRAM(S): 80 TABLET, FILM COATED ORAL at 22:21

## 2021-10-07 RX ADMIN — Medication 2: at 11:44

## 2021-10-07 NOTE — PROGRESS NOTE ADULT - ASSESSMENT
71 year old male with PMHx of CVA, partial seizures ADAIR, DM, DLD, BPH, gait disorder (mainly mechanical) followed by neuro outpatient presenting to the ED for generalized weakness. Pt states over the past few weeks, he has been unable to "tie his shoelaces and put on his jeans, because it makes him feel SOB. Denies dyspnea when walking up the stairs or walking his dog. States that he feels a lot more wobbly than usual and has fallen multiple times, most recent episode last night at home.  States that he sees the neurologist Dr Bullock o/p. Says he is compliant with all his medications and lives with his son Rosalino. Has a cane for walking but does not use it.     #Generalized weakness and gait disorder  #r/o Guillain-Barré - bilateral LE weakness, recent of diarrhea  - CT Head negative on 10/2, orthostatic vitals WNL  - LP attempted 10/4 by medical team as well as Dr. Brannon (Neurology Attending), unsuccessful  - IR consulted for LP- today consult canceled since pt LE weakness improved and its reasonable to forgo LP as likelihood of Guillain bare is extremely low  - B12 low,f/u MMA then replenish, TSH WNL, RPR NG  - PT consulted, recommending rehab  -MRI C/T/L Spine w/o Con- reviewd  -f/u with Neuro    #Episodic shortness of breath - ACS ruled out  - Troponin 0.02 on admission ==> 0.03 ==> 0.01, BNP 1142  - patient continues to deny chest pain, no b/l LE edema  - TTE 10/03: Left ventricular ejection fraction, by visual estimation, is 50 to 55%   - CXR in ED negative for acute cardiopulmonary disease     #PMHx of CVA   - no residual symptoms, on Aspirin/Plavix, holding Plavix as per IR for LP    #Partial Seizures  - keppra level 5.8  - f/u Neuro recs  - continuing home dose for now    #HTN   - continuing with enalapril and metoprolol     #DM   - A1c 7.0 on 10/4  - SSI for now    #ADAIR  - CPAP @ night     #DLD  - continuing with atorvastatin    #BPH  #Urge Incontinence   - continuing with oxybutynin and oxcarbazepine                                                                             ----------------------------------------------------  # DVT prophylaxis: Heparin subQ 5000U subQ q8h  # GI prophylaxis: N/A  # Diet: Consistent Carbs + DASH/TLC  # Activity: OOB to chair  # Code status: Full  # Disposition: remain on floor                                                                           --------------------------------------------------------  # Handoff: pending LP by IR, f/u with Neuro, MMA level then start aggressive B12 regimen,

## 2021-10-07 NOTE — PROGRESS NOTE ADULT - SUBJECTIVE AND OBJECTIVE BOX
Hospital Day:  5d    Subjective: Patient is a 71y old  Male who presents with a chief complaint of unsteady gait LE weakness (07 Oct 2021 16:24)      Pt seen and evaluated at bedside. Pt admits improved b/l LE weakness.  Complaints: None  Over the night Events: None    Past Medical Hx:   Nonintractable epilepsy without status epilepticus, unspecified epilepsy type    Neuropathy    Hypertension, unspecified type    Type 2 diabetes mellitus with complication, without long-term current use of insulin    Anxiety    Cerebrovascular accident (CVA), unspecified mechanism      Past Sx:  History of surgery    Capsular cataract of right eye      Allergies:  No Known Allergies    Current Meds:   Standng Meds:  aspirin  chewable 81 milliGRAM(s) Oral daily  atorvastatin 40 milliGRAM(s) Oral at bedtime  busPIRone 15 milliGRAM(s) Oral <User Schedule>  clopidogrel Tablet 75 milliGRAM(s) Oral daily  cyanocobalamin 1000 MICROGram(s) Oral <User Schedule>  dextrose 40% Gel 15 Gram(s) Oral once  dextrose 5%. 1000 milliLiter(s) (50 mL/Hr) IV Continuous <Continuous>  dextrose 5%. 1000 milliLiter(s) (100 mL/Hr) IV Continuous <Continuous>  dextrose 50% Injectable 25 Gram(s) IV Push once  dextrose 50% Injectable 12.5 Gram(s) IV Push once  dextrose 50% Injectable 25 Gram(s) IV Push once  enalapril 20 milliGRAM(s) Oral two times a day  escitalopram 10 milliGRAM(s) Oral daily  glucagon  Injectable 1 milliGRAM(s) IntraMuscular once  heparin   Injectable 5000 Unit(s) SubCutaneous every 8 hours  hydrALAZINE 25 milliGRAM(s) Oral two times a day  insulin lispro (ADMELOG) corrective regimen sliding scale   SubCutaneous three times a day before meals  levETIRAcetam 750 milliGRAM(s) Oral two times a day  lidocaine 1% Injectable 10 milliLiter(s) Local Injection once  LORazepam   Injectable 1 milliGRAM(s) IV Push once  melatonin 5 milliGRAM(s) Oral at bedtime  metoprolol tartrate 50 milliGRAM(s) Oral two times a day  OXcarbazepine 300 milliGRAM(s) Oral two times a day    PRN Meds:      Vital Signs:   T(F): 98.2 (10-07-21 @ 20:00), Max: 98.2 (10-07-21 @ 20:00)  HR: 64 (10-07-21 @ 20:00) (64 - 78)  BP: 147/71 (10-07-21 @ 20:00) (138/78 - 158/81)  RR: 18 (10-07-21 @ 20:00) (18 - 18)  SpO2: 94% (10-07-21 @ 20:00) (94% - 95%)    Physical Exam:   GENERAL: NAD, Resting in bed  HEENT: NCAT  CHEST/LUNG: Clear to auscultation bilaterally; No wheezing or rubs.   HEART: Regular rate and rhythm; No murmurs, rubs, or gallops  ABDOMEN: Bowel sounds present; Soft, Nontender, Nondistended.   EXTREMITIES:  No clubbing, cyanosis, or edema  NERVOUS SYSTEM:  Alert & Oriented X3    FLUID BALANCE    10-06-21 @ 07:01  -  10-07-21 @ 07:00  --------------------------------------------------------  IN: 200 mL / OUT: 150 mL / NET: 50 mL    10-07-21 @ 07:01  -  10-07-21 @ 22:44  --------------------------------------------------------  IN: 210 mL / OUT: 0 mL / NET: 210 mL        Labs:                         14.3   6.25  )-----------( 194      ( 07 Oct 2021 08:37 )             42.8     Neutophil% 57.0, Lymphocyte% 23.8, Monocyte% 14.6, Bands% 0.5 10-07-21 @ 08:37    07 Oct 2021 08:37    136    |  99     |  21     ----------------------------<  163    3.9     |  20     |  0.8      Ca    8.7        07 Oct 2021 08:37  Mg     2.1       07 Oct 2021 08:37    TPro  6.7    /  Alb  3.4    /  TBili  0.7    /  DBili  x      /  AST  55     /  ALT  58     /  AlkPhos  67     07 Oct 2021 08:37            Serum Pro-Brain Natriuretic Peptide: 1142 pg/mL (10-02-21 @ 11:00)    D-Dimer Assay, Quantitative: 64 ng/mL DDU (10-02-21 @ 11:00)      Radiology:     < from: Xray Lumbar Spine AP + Lateral (10.06.21 @ 22:20) >  IMPRESSION:    Moderate degenerative changes of the lumbar spine described above. No acute compression fracture.    Lumbar levoscoliosis and trace anterolisthesis of L5 on S1.    --- End of Report ---      < end of copied text >    < from: MR Thoracic Spine No Cont (10.06.21 @ 16:42) >    IMPRESSION:    Noevidence of cord compression or cord signal abnormality. Mild degenerative changes.    --- End of Report ---    < end of copied text >    < from: MR Cervical Spine No Cont (10.06.21 @ 16:10) >  IMPRESSION:    1.  Mild degenerative changes superimposed upon a congenitally narrow cervical spinal canal with multilevel mild-moderate spinal stenosis without cord compression.    2.  Moderate left foraminal narrowing at C4-5 and C5-6.    --- End of Report ---        < end of copied text >

## 2021-10-07 NOTE — PROGRESS NOTE ADULT - SUBJECTIVE AND OBJECTIVE BOX
ANN BLANCO  71y  Male      Patient is a 71y old  Male who presents with a chief complaint of Generalized Weakness, Gait Disorder (05 Oct 2021 11:13)      INTERVAL HPI/OVERNIGHT EVENTS: very tired, did not sleep well last night. otherwise reports strength in LE's improving       REVIEW OF SYSTEMS:  limited as above  All other review of systems negative    T(C): 36.1 (10-07-21 @ 11:50), Max: 36.5 (10-06-21 @ 20:29)  HR: 67 (10-07-21 @ 08:04) (67 - 78)  BP: 140/67 (10-07-21 @ 11:50) (138/78 - 184/88)  RR: 18 (10-07-21 @ 11:50) (18 - 18)  SpO2: 95% (10-07-21 @ 08:04) (95% - 95%)  Wt(kg): --Vital Signs Last 24 Hrs  T(C): 36.1 (07 Oct 2021 11:50), Max: 36.5 (06 Oct 2021 20:29)  T(F): 97 (07 Oct 2021 11:50), Max: 97.7 (06 Oct 2021 20:29)  HR: 67 (07 Oct 2021 08:04) (67 - 78)  BP: 140/67 (07 Oct 2021 11:50) (138/78 - 184/88)  BP(mean): --  RR: 18 (07 Oct 2021 11:50) (18 - 18)  SpO2: 95% (07 Oct 2021 08:04) (95% - 95%)      10-06-21 @ 07:01  -  10-07-21 @ 07:00  --------------------------------------------------------  IN: 200 mL / OUT: 150 mL / NET: 50 mL    10-07-21 @ 07:01  -  10-07-21 @ 16:25  --------------------------------------------------------  IN: 210 mL / OUT: 0 mL / NET: 210 mL        PHYSICAL EXAM:  GENERAL: NAD, deconditioned  PSYCH: no agitation, baseline mentation  NERVOUS SYSTEM:  Alert & Oriented X3, no new focal deficits, 5/5 str b/l LE's  PULMONARY: Clear to percussion bilaterally; No rales, rhonchi, wheezing, or rubs  CARDIOVASCULAR: Regular rate and rhythm; No murmurs, rubs, or gallops  GI: Soft, Nontender, Nondistended; Bowel sounds present rotund  EXTREMITIES:  2+ Peripheral Pulses, No clubbing, cyanosis, or edema    Consultant(s) Notes Reviewed:  [x ] YES  [ ] NO    Discussed with Consultants/Other Providers [ x] YES     LABS                          14.3   6.25  )-----------( 194      ( 07 Oct 2021 08:37 )             42.8     10-07    136  |  99  |  21<H>  ----------------------------<  163<H>  3.9   |  20  |  0.8    Ca    8.7      07 Oct 2021 08:37  Mg     2.1     10-07    TPro  6.7  /  Alb  3.4<L>  /  TBili  0.7  /  DBili  x   /  AST  55<H>  /  ALT  58<H>  /  AlkPhos  67  10-07        PT/INR - ( 06 Oct 2021 21:30 )   PT: 10.80 sec;   INR: 0.94 ratio         PTT - ( 06 Oct 2021 21:30 )  PTT:31.4 sec  Lactate Trend        CAPILLARY BLOOD GLUCOSE      POCT Blood Glucose.: 199 mg/dL (07 Oct 2021 11:24)        RADIOLOGY & ADDITIONAL TESTS:    Imaging Personally Reviewed:  [ ] YES  [ ] NO    HEALTH ISSUES - PROBLEM Dx:

## 2021-10-07 NOTE — PROGRESS NOTE ADULT - ASSESSMENT
71y M c PMHc cva/ partial seizures, kendra, dm2, unsteady gait, bph recent diarrhea p/w worsening LE weakness likely 2/2 multilevel lumbar degenerative disease on backdrop of scoliosis , marginally low b12 lvl    f/u keppra lvl  MR spine appreciated  f/u MMA lvl, possible b12 repletion  PT  if diarrhea recurs, send for gi pcr  appreciated neuro f/u, agree that in light of clinical improvement proceed c PT/OT, reasonable to forgo LP as likelihood of Guillain bare is extremely low  resume home plavix      Provider Hand off  Pending- covid swab, anticipate for d/c to snf for tomorrow, pt/ot  Disposition- snf + auth

## 2021-10-08 ENCOUNTER — TRANSCRIPTION ENCOUNTER (OUTPATIENT)
Age: 71
End: 2021-10-08

## 2021-10-08 LAB
ALBUMIN SERPL ELPH-MCNC: 3.7 G/DL — SIGNIFICANT CHANGE UP (ref 3.5–5.2)
ALP SERPL-CCNC: 67 U/L — SIGNIFICANT CHANGE UP (ref 30–115)
ALT FLD-CCNC: 58 U/L — HIGH (ref 0–41)
ANION GAP SERPL CALC-SCNC: 16 MMOL/L — HIGH (ref 7–14)
AST SERPL-CCNC: 50 U/L — HIGH (ref 0–41)
BASOPHILS # BLD AUTO: 0.06 K/UL — SIGNIFICANT CHANGE UP (ref 0–0.2)
BASOPHILS NFR BLD AUTO: 1 % — SIGNIFICANT CHANGE UP (ref 0–1)
BILIRUB SERPL-MCNC: 0.7 MG/DL — SIGNIFICANT CHANGE UP (ref 0.2–1.2)
BUN SERPL-MCNC: 21 MG/DL — HIGH (ref 10–20)
CALCIUM SERPL-MCNC: 8.8 MG/DL — SIGNIFICANT CHANGE UP (ref 8.5–10.1)
CHLORIDE SERPL-SCNC: 102 MMOL/L — SIGNIFICANT CHANGE UP (ref 98–110)
CO2 SERPL-SCNC: 20 MMOL/L — SIGNIFICANT CHANGE UP (ref 17–32)
CREAT SERPL-MCNC: 0.8 MG/DL — SIGNIFICANT CHANGE UP (ref 0.7–1.5)
EOSINOPHIL # BLD AUTO: 0.29 K/UL — SIGNIFICANT CHANGE UP (ref 0–0.7)
EOSINOPHIL NFR BLD AUTO: 4.9 % — SIGNIFICANT CHANGE UP (ref 0–8)
GLUCOSE BLDC GLUCOMTR-MCNC: 136 MG/DL — HIGH (ref 70–99)
GLUCOSE BLDC GLUCOMTR-MCNC: 152 MG/DL — HIGH (ref 70–99)
GLUCOSE BLDC GLUCOMTR-MCNC: 164 MG/DL — HIGH (ref 70–99)
GLUCOSE BLDC GLUCOMTR-MCNC: 178 MG/DL — HIGH (ref 70–99)
GLUCOSE SERPL-MCNC: 152 MG/DL — HIGH (ref 70–99)
HCT VFR BLD CALC: 44.2 % — SIGNIFICANT CHANGE UP (ref 42–52)
HGB BLD-MCNC: 14.4 G/DL — SIGNIFICANT CHANGE UP (ref 14–18)
IMM GRANULOCYTES NFR BLD AUTO: 0.7 % — HIGH (ref 0.1–0.3)
LEVETIRACETAM SERPL-MCNC: 10.6 UG/ML — SIGNIFICANT CHANGE UP (ref 10–40)
LYMPHOCYTES # BLD AUTO: 1.63 K/UL — SIGNIFICANT CHANGE UP (ref 1.2–3.4)
LYMPHOCYTES # BLD AUTO: 27.8 % — SIGNIFICANT CHANGE UP (ref 20.5–51.1)
MAGNESIUM SERPL-MCNC: 2 MG/DL — SIGNIFICANT CHANGE UP (ref 1.8–2.4)
MCHC RBC-ENTMCNC: 30.1 PG — SIGNIFICANT CHANGE UP (ref 27–31)
MCHC RBC-ENTMCNC: 32.6 G/DL — SIGNIFICANT CHANGE UP (ref 32–37)
MCV RBC AUTO: 92.3 FL — SIGNIFICANT CHANGE UP (ref 80–94)
MONOCYTES # BLD AUTO: 0.74 K/UL — HIGH (ref 0.1–0.6)
MONOCYTES NFR BLD AUTO: 12.6 % — HIGH (ref 1.7–9.3)
NEUTROPHILS # BLD AUTO: 3.1 K/UL — SIGNIFICANT CHANGE UP (ref 1.4–6.5)
NEUTROPHILS NFR BLD AUTO: 53 % — SIGNIFICANT CHANGE UP (ref 42.2–75.2)
NRBC # BLD: 0 /100 WBCS — SIGNIFICANT CHANGE UP (ref 0–0)
PLATELET # BLD AUTO: 222 K/UL — SIGNIFICANT CHANGE UP (ref 130–400)
POTASSIUM SERPL-MCNC: 4 MMOL/L — SIGNIFICANT CHANGE UP (ref 3.5–5)
POTASSIUM SERPL-SCNC: 4 MMOL/L — SIGNIFICANT CHANGE UP (ref 3.5–5)
PROT SERPL-MCNC: 6.9 G/DL — SIGNIFICANT CHANGE UP (ref 6–8)
RBC # BLD: 4.79 M/UL — SIGNIFICANT CHANGE UP (ref 4.7–6.1)
RBC # FLD: 12.3 % — SIGNIFICANT CHANGE UP (ref 11.5–14.5)
SODIUM SERPL-SCNC: 138 MMOL/L — SIGNIFICANT CHANGE UP (ref 135–146)
WBC # BLD: 5.86 K/UL — SIGNIFICANT CHANGE UP (ref 4.8–10.8)
WBC # FLD AUTO: 5.86 K/UL — SIGNIFICANT CHANGE UP (ref 4.8–10.8)

## 2021-10-08 PROCEDURE — 99231 SBSQ HOSP IP/OBS SF/LOW 25: CPT

## 2021-10-08 RX ORDER — CHLORHEXIDINE GLUCONATE 213 G/1000ML
1 SOLUTION TOPICAL
Refills: 0 | Status: DISCONTINUED | OUTPATIENT
Start: 2021-10-08 | End: 2021-10-12

## 2021-10-08 RX ADMIN — Medication 2: at 11:35

## 2021-10-08 RX ADMIN — HEPARIN SODIUM 5000 UNIT(S): 5000 INJECTION INTRAVENOUS; SUBCUTANEOUS at 15:08

## 2021-10-08 RX ADMIN — Medication 81 MILLIGRAM(S): at 11:34

## 2021-10-08 RX ADMIN — Medication 50 MILLIGRAM(S): at 17:26

## 2021-10-08 RX ADMIN — Medication 2: at 17:26

## 2021-10-08 RX ADMIN — ATORVASTATIN CALCIUM 40 MILLIGRAM(S): 80 TABLET, FILM COATED ORAL at 22:05

## 2021-10-08 RX ADMIN — PREGABALIN 1000 MICROGRAM(S): 225 CAPSULE ORAL at 23:42

## 2021-10-08 RX ADMIN — Medication 20 MILLIGRAM(S): at 05:45

## 2021-10-08 RX ADMIN — LEVETIRACETAM 750 MILLIGRAM(S): 250 TABLET, FILM COATED ORAL at 17:26

## 2021-10-08 RX ADMIN — LEVETIRACETAM 750 MILLIGRAM(S): 250 TABLET, FILM COATED ORAL at 05:45

## 2021-10-08 RX ADMIN — Medication 2: at 08:04

## 2021-10-08 RX ADMIN — ESCITALOPRAM OXALATE 10 MILLIGRAM(S): 10 TABLET, FILM COATED ORAL at 11:35

## 2021-10-08 RX ADMIN — Medication 15 MILLIGRAM(S): at 05:45

## 2021-10-08 RX ADMIN — HEPARIN SODIUM 5000 UNIT(S): 5000 INJECTION INTRAVENOUS; SUBCUTANEOUS at 05:45

## 2021-10-08 RX ADMIN — Medication 5 MILLIGRAM(S): at 22:05

## 2021-10-08 RX ADMIN — CLOPIDOGREL BISULFATE 75 MILLIGRAM(S): 75 TABLET, FILM COATED ORAL at 11:34

## 2021-10-08 RX ADMIN — Medication 20 MILLIGRAM(S): at 17:27

## 2021-10-08 RX ADMIN — OXCARBAZEPINE 300 MILLIGRAM(S): 300 TABLET, FILM COATED ORAL at 17:26

## 2021-10-08 RX ADMIN — OXCARBAZEPINE 300 MILLIGRAM(S): 300 TABLET, FILM COATED ORAL at 05:45

## 2021-10-08 RX ADMIN — Medication 50 MILLIGRAM(S): at 05:45

## 2021-10-08 RX ADMIN — Medication 25 MILLIGRAM(S): at 05:45

## 2021-10-08 RX ADMIN — Medication 25 MILLIGRAM(S): at 17:27

## 2021-10-08 RX ADMIN — CHLORHEXIDINE GLUCONATE 1 APPLICATION(S): 213 SOLUTION TOPICAL at 11:31

## 2021-10-08 RX ADMIN — HEPARIN SODIUM 5000 UNIT(S): 5000 INJECTION INTRAVENOUS; SUBCUTANEOUS at 22:05

## 2021-10-08 NOTE — DISCHARGE NOTE PROVIDER - CARE PROVIDER_API CALL
Rohit Bullock)  Neurology  [address] [place] [address], [address]  [place] [address]  Phone: [phone]  Fax: [phone]  Established Patient  Follow Up Time: 2 weeks    Jon Savage ()  Medicine  Physicians  [address] [facility], 1st [place] [address]  Phone: [phone]  Fax: [phone]  Follow Up Time: 2 weeks

## 2021-10-08 NOTE — DISCHARGE NOTE PROVIDER - NSDCQMERRANDS_GEN_ALL_CORE
Detail Level: Simple
Price (Do Not Change): 0.00
Instructions: This plan will send the code FBSD to the PM system.  DO NOT or CHANGE the price.
Yes

## 2021-10-08 NOTE — PROGRESS NOTE ADULT - SUBJECTIVE AND OBJECTIVE BOX
ANN BLANCO  71y  Male      Patient is a 71y old  Male who presents with a chief complaint of LE weakness (08 Oct 2021 09:56)      INTERVAL HPI/OVERNIGHT EVENTS: reports LE weakness improving, ambulating c walker and staff      REVIEW OF SYSTEMS:  limited as above  All other review of systems negative    T(C): 35.7 (10-08-21 @ 13:23), Max: 36.8 (10-07-21 @ 20:00)  HR: 73 (10-08-21 @ 13:23) (64 - 78)  BP: 120/66 (10-08-21 @ 13:23) (120/66 - 159/82)  RR: 18 (10-08-21 @ 13:23) (18 - 18)  SpO2: 96% (10-08-21 @ 07:52) (94% - 96%)  Wt(kg): --Vital Signs Last 24 Hrs  T(C): 35.7 (08 Oct 2021 13:23), Max: 36.8 (07 Oct 2021 20:00)  T(F): 96.3 (08 Oct 2021 13:23), Max: 98.2 (07 Oct 2021 20:00)  HR: 73 (08 Oct 2021 13:23) (64 - 78)  BP: 120/66 (08 Oct 2021 13:23) (120/66 - 159/82)  BP(mean): --  RR: 18 (08 Oct 2021 13:23) (18 - 18)  SpO2: 96% (08 Oct 2021 07:52) (94% - 96%)      10-07-21 @ 07:01  -  10-08-21 @ 07:00  --------------------------------------------------------  IN: 210 mL / OUT: 0 mL / NET: 210 mL        PHYSICAL EXAM:  GENERAL: NAD  PSYCH: no agitation, baseline mentation  NERVOUS SYSTEM:  Alert & Oriented X3, no new focal deficits, str 5/5 b/l LE  GAIT: ambulates c walker standby assistance  PULMONARY: Clear to percussion bilaterally; No rales, rhonchi, wheezing, or rubs  CARDIOVASCULAR: Regular rate and rhythm; No murmurs, rubs, or gallops  GI: Soft, Nontender, Nondistended; Bowel sounds present rotund  EXTREMITIES:  2+ Peripheral Pulses, No clubbing, cyanosis, or edema    Consultant(s) Notes Reviewed:  [x ] YES  [ ] NO    Discussed with Consultants/Other Providers [ x] YES     LABS                          14.4   5.86  )-----------( 222      ( 08 Oct 2021 04:30 )             44.2     10-08    138  |  102  |  21<H>  ----------------------------<  152<H>  4.0   |  20  |  0.8    Ca    8.8      08 Oct 2021 04:30  Mg     2.0     10-08    TPro  6.9  /  Alb  3.7  /  TBili  0.7  /  DBili  x   /  AST  50<H>  /  ALT  58<H>  /  AlkPhos  67  10-08        PT/INR - ( 06 Oct 2021 21:30 )   PT: 10.80 sec;   INR: 0.94 ratio         PTT - ( 06 Oct 2021 21:30 )  PTT:31.4 sec  Lactate Trend        CAPILLARY BLOOD GLUCOSE      POCT Blood Glucose.: 152 mg/dL (08 Oct 2021 16:42)        RADIOLOGY & ADDITIONAL TESTS:    Imaging Personally Reviewed:  [ ] YES  [ ] NO    HEALTH ISSUES - PROBLEM Dx:

## 2021-10-08 NOTE — PROGRESS NOTE ADULT - ASSESSMENT
71y M c PMHc cva/ partial seizures, kendra, dm2, unsteady gait, bph recent diarrhea p/w worsening LE weakness likely 2/2 multilevel lumbar degenerative disease on backdrop of scoliosis , marginally low b12 lvl    f/u keppra lvl now 10.6 therapeutic range  MR spine   cspine:  1.  Mild degenerative changes superimposed upon a congenitally narrow cervical spinal canal with multilevel mild-moderate spinal stenosis without cord compression.    2.  Moderate left foraminal narrowing at C4-5 and C5-6    Tspine:  No evidence of cord compression or cord signal abnormality. Mild degenerative changes.    L spine: motion artifact/ cancelled ---> lumbar XRays show:  5 non-rib bearing lumbar-type vertebrae are visualized. Lumbar levoscoliosis.    No acute displaced fracture. The vertebral body heights are maintained. Trace anterolisthesis of L5 on S1.    Moderate multilevel degenerative disc disease with bony endplate degenerative changes, facet arthropathy, and bridging anterior osteophytes    f/u MMA lvl, possible b12 repletion  ambulating with PT and walker, standby supervision  prior diarrhea has since resolved  appreciated neuro f/u, agree that in light of clinical improvement proceed c PT/OT, reasonable to forgo LP as likelihood of Guillain bare is extremely low  resumed home plavix    encourage outpatient f/u c Dr. Bullock neuro, following snf rehab      Provider Hand off  Pending- covid swab, anticipate for d/c to snf for tomorrow, pt/ot  Family discussion- my team members have discussed with son regarding imaging findings and neurology recommendations  Disposition- snf + auth

## 2021-10-08 NOTE — DISCHARGE NOTE PROVIDER - NSDCMRMEDTOKEN_GEN_ALL_CORE_FT
aspirin 81 mg oral tablet: 1 tab(s) orally once a day  busPIRone 15 mg oral tablet: 1 tab(s) orally once a day  enalapril 20 mg oral tablet: 1 tab(s) orally 2 times a day  escitalopram 20 mg oral tablet: 1 tab(s) orally 2 times a day  gabapentin 400 mg oral tablet: 1 tab(s) orally once a day  Keppra 750 mg oral tablet: 1 tab(s) orally 2 times a day  Lipitor 40 mg oral tablet: 1 tab(s) orally once a day  metFORMIN 750 mg oral tablet, extended release: 1 tab(s) orally once a day  metoprolol succinate 50 mg oral tablet, extended release: 1 tab(s) orally once a day  OXcarbazepine 300 mg oral tablet, extended release: 1 tab(s) orally once a day  oxybutynin 15 mg/24 hr oral tablet, extended release: 1 tab(s) orally once a day  Plavix 75 mg oral tablet: 1 tab(s) orally once a day   aspirin 81 mg oral tablet: 1 tab(s) orally once a day  busPIRone 15 mg oral tablet: 1 tab(s) orally once a day  cyanocobalamin 1000 mcg oral tablet: 1 tab(s) orally   enalapril 20 mg oral tablet: 1 tab(s) orally 2 times a day  escitalopram 20 mg oral tablet: 1 tab(s) orally 2 times a day  gabapentin 400 mg oral tablet: 1 tab(s) orally once a day  Keppra 750 mg oral tablet: 1 tab(s) orally 2 times a day  Lipitor 40 mg oral tablet: 1 tab(s) orally once a day  metFORMIN 750 mg oral tablet, extended release: 1 tab(s) orally once a day  metoprolol succinate 50 mg oral tablet, extended release: 1 tab(s) orally once a day  OXcarbazepine 300 mg oral tablet, extended release: 1 tab(s) orally once a day  oxybutynin 15 mg/24 hr oral tablet, extended release: 1 tab(s) orally once a day  Plavix 75 mg oral tablet: 1 tab(s) orally once a day   aspirin 81 mg oral tablet: 1 tab(s) orally once a day  busPIRone 15 mg oral tablet: 1 tab(s) orally once a day  cyanocobalamin 1000 mcg oral tablet: 1 tab(s) orally once a day   enalapril 20 mg oral tablet: 1 tab(s) orally 2 times a day  escitalopram 20 mg oral tablet: 1 tab(s) orally 2 times a day  gabapentin 400 mg oral tablet: 1 tab(s) orally once a day  hydrALAZINE 25 mg oral tablet: 1 tab(s) orally 2 times a day  Keppra 750 mg oral tablet: 1 tab(s) orally 2 times a day  Lipitor 40 mg oral tablet: 1 tab(s) orally once a day  metFORMIN 750 mg oral tablet, extended release: 1 tab(s) orally once a day  metoprolol succinate 50 mg oral tablet, extended release: 1 tab(s) orally once a day  OXcarbazepine 300 mg oral tablet, extended release: 1 tab(s) orally once a day  oxybutynin 15 mg/24 hr oral tablet, extended release: 1 tab(s) orally once a day  Plavix 75 mg oral tablet: 1 tab(s) orally once a day   aspirin 81 mg oral tablet: 1 tab(s) orally once a day  busPIRone 15 mg oral tablet: 1 tab(s) orally once a day  cyanocobalamin 1000 mcg oral tablet: 1 tab(s) orally once a day   enalapril 20 mg oral tablet: 1 tab(s) orally 2 times a day  escitalopram 20 mg oral tablet: 1 tab(s) orally 2 times a day  hydrALAZINE 25 mg oral tablet: 1 tab(s) orally 2 times a day  Keppra 750 mg oral tablet: 1 tab(s) orally 2 times a day  Lipitor 40 mg oral tablet: 1 tab(s) orally once a day  metFORMIN 750 mg oral tablet, extended release: 1 tab(s) orally once a day  metoprolol succinate 50 mg oral tablet, extended release: 1 tab(s) orally once a day  OXcarbazepine 300 mg oral tablet, extended release: 1 tab(s) orally once a day  oxybutynin 15 mg/24 hr oral tablet, extended release: 1 tab(s) orally once a day  Plavix 75 mg oral tablet: 1 tab(s) orally once a day

## 2021-10-08 NOTE — DISCHARGE NOTE PROVIDER - HOSPITAL COURSE
71 year old male with PMHx of CVA, partial seizures ADAIR, DM, DLD, BPH, gait disorder (mainly mechanical) followed by neuro outpatient presenting to the ED for generalized weakness. Pt states over the past few weeks, he has been unable to "tie his shoelaces and put on his jeans, because it makes him feel SOB. Denies dyspnea when walking up the stairs or walking his dog. States that he feels a lot more wobbly than usual and has fallen multiple times, most recent episode last night at home.  States that he sees the neurologist Dr Bullock o/p. Says he is compliant with all his medications and lives with his son Rosalino. Has a cane for walking but does not use it.     CT head was negative for any acute pathology, orthostatic vitals were wnl, ECHO 10/03 revealed EF 50-55% and ACS r/o. Thyroid function test was wnl and syphilis test was negative. Plan was to r/o Guillain-Barré - bilateral LE weakness, given recent of diarrhea, however after patient progressive improvement LP was cancelled 10/7 as likelihood of it was extremely low.   Pt had MRI of C and T spine which was negative for any acute pathology. XR of lumbar spine revealed: Moderate degenerative changes of the lumbar spine described above. No acute compression fracture. Lumbar levoscoliosis and trace anterolisthesis of L5 on S1.      During his Hospital course, pt was evaluated by Neurology team. He was able to give his history with Dr. Bullock his neurologist. It appears that patient was complaining of gait difficulties in April 2021 and using a cane for ambulation.  His exam shows weakness and some sensory loss in the LE b/l which is likely related to his known lumbar spine disease.  No further imaging is needed and would have PT/OT/Physiatry eval.    Plan  1. No further neurological imaging needed at this time  2. PT/OT/ Rehab     71 year old male with PMHx of CVA, partial seizures ADAIR, DM, DLD, BPH, gait disorder (mainly mechanical) followed by neuro outpatient presenting to the ED for generalized weakness. Pt states over the past few weeks, he has been unable to "tie his shoelaces and put on his jeans, because it makes him feel SOB. Denies dyspnea when walking up the stairs or walking his dog. States that he feels a lot more wobbly than usual and has fallen multiple times, most recent episode last night at home.  States that he sees the neurologist Dr Bullock o/p. Says he is compliant with all his medications and lives with his son Rosalino. Has a cane for walking but does not use it.     CT head was negative for any acute pathology, orthostatic vitals were wnl, ECHO 10/03 revealed EF 50-55% and ACS r/o. Thyroid function test was wnl and syphilis test was negative. Plan was to r/o Guillain-Barré - bilateral LE weakness, given recent of diarrhea, however after patient had progressive improvement the LP was cancelled 10/7 as likelihood of it was extremely low.   Pt had MRI of C and T spine which was negative for any acute pathology. XR of lumbar spine revealed: Moderate degenerative changes of the lumbar spine described above. No acute compression fracture. Lumbar levoscoliosis and trace anterolisthesis of L5 on S1.      During his Hospital course, pt was evaluated by Neurology team. He was able to give his history with Dr. Bullock his neurologist. It appears that patient was complaining of gait difficulties in April 2021 and using a cane for ambulation.  His exam shows weakness and some sensory loss in the LE b/l which is likely related to his known lumbar spine disease.  No further imaging is needed and would have PT/OT/Physiatry eval.    Plan  1. No further neurological imaging needed at this time  2. PT/OT/ Rehab    pt now ambulating with RW and supervision  original plan was for STR at SNF but now pt can go home with services

## 2021-10-08 NOTE — DISCHARGE NOTE PROVIDER - CARE PROVIDERS DIRECT ADDRESSES
[email or web address]

## 2021-10-08 NOTE — DISCHARGE NOTE PROVIDER - NSDCCPCAREPLAN_GEN_ALL_CORE_FT
PRINCIPAL DISCHARGE DIAGNOSIS  Diagnosis: Weakness  Assessment and Plan of Treatment: You came here for evaluation of generalized weakness mostly on your bilateral lower extrimities. All imaging and work ups were negative for any acute pathology. Your orthostatic blood pressure was within normal limits. We attempted to perform lumbar puncture to rule out Guillain-Barré syndrome which is progressive paralysis illness that provoks by a preceding infection. However, after your improvement - we cancelled the Lumbar puncture by Interventional Radiology team as the likelihood of this syndrome was extrimely low. Your CT head, MRI of cervical and thoracic spine were negative for any acute pathology. Your XR of lubmer spine revealed Moderate multilevel degenerative disc disease  which is a chronic finding. You were also found to have low vitamin B12. please take your supplement as prescribed. Please follow up with your Neurologist Dr. Bullock within 2 weeks of dischargh. Please continue to take all of your medications as prescribed.      SECONDARY DISCHARGE DIAGNOSES  Diagnosis: Cerebrovascular accident (CVA)  Assessment and Plan of Treatment: Please continue to take your Plavix and Asprin as prescribed.    Diagnosis: Partial seizures  Assessment and Plan of Treatment: Please continue to take your anti seizure medications as prescribed. Please follow up with Dr. Bullock within 2 weeks of discharge.     PRINCIPAL DISCHARGE DIAGNOSIS  Diagnosis: Weakness  Assessment and Plan of Treatment: You came here for evaluation of generalized weakness mostly on your bilateral lower extrimities. All imaging and work ups were negative for any acute pathology. Your orthostatic blood pressure was within normal limits. We attempted to perform lumbar puncture to rule out Guillain-Barré syndrome which is progressive paralysis illness that provoks by a preceding infection. However, after your improvement - we cancelled the Lumbar puncture by Interventional Radiology team as the likelihood of this syndrome was extrimely low. Your CT head, MRI of cervical and thoracic spine were negative for any acute pathology. Your XR of lubmer spine revealed Moderate multilevel degenerative disc disease  which is a chronic finding. You were also found to have low vitamin B12. please take your supplement as prescribed. Please follow up with your Neurologist Dr. Bullock within 2 weeks of dischargh. Please continue to take all of your medications as prescribed.        SECONDARY DISCHARGE DIAGNOSES  Diagnosis: Cerebrovascular accident (CVA)  Assessment and Plan of Treatment: Please continue to take your Plavix and Asprin as prescribed.      Diagnosis: Partial seizures  Assessment and Plan of Treatment: Please continue to take your anti seizure medications as prescribed. Please follow up with Dr. Bullock within 2 weeks of discharge.     PRINCIPAL DISCHARGE DIAGNOSIS  Diagnosis: Weakness  Assessment and Plan of Treatment: You came here for evaluation of generalized weakness mostly on your bilateral lower extrimities. All imaging and work ups were negative for any acute pathology. Your orthostatic blood pressure was within normal limits. We attempted to perform lumbar puncture to rule out Guillain-Barré syndrome which is progressive paralysis illness that provoks by a preceding infection. However, after your improvement - we cancelled the Lumbar puncture by Interventional Radiology team as the likelihood of this syndrome was extrimely low. Your CT head, MRI of cervical and thoracic spine were negative for any acute pathology. Your XR of lubmer spine revealed Moderate multilevel degenerative disc disease  which is a chronic finding. You were also found to have low vitamin B12. please take your supplement as prescribed. Please follow up with your Neurologist Dr. Bullock within 2 weeks of discharge. Please continue to take all of your medications as prescribed.        SECONDARY DISCHARGE DIAGNOSES  Diagnosis: Cerebrovascular accident (CVA)  Assessment and Plan of Treatment: Please continue to take your Plavix and Asprin as prescribed.      Diagnosis: Partial seizures  Assessment and Plan of Treatment: Please continue to take your anti seizure medications as prescribed. Please follow up with Dr. Bullock within 2 weeks of discharge.

## 2021-10-08 NOTE — DISCHARGE NOTE PROVIDER - PROVIDER TOKENS
PROVIDER:[TOKEN:[[id number]],FOLLOWUP:[2 weeks],ESTABLISHEDPATIENT:[T]],PROVIDER:[TOKEN:[[id number]],FOLLOWUP:[2 weeks]]

## 2021-10-08 NOTE — DISCHARGE NOTE PROVIDER - NSDCFUSCHEDAPPT_GEN_ALL_CORE_FT
ANN BLANCO ; 10/19/2021 ; [facility] [address] ; 11/24/2021 ; [facility] [address] ; 12/08/2021 ; NP Neuro [address]

## 2021-10-09 LAB
ALBUMIN SERPL ELPH-MCNC: 3.4 G/DL — LOW (ref 3.5–5.2)
ALP SERPL-CCNC: 57 U/L — SIGNIFICANT CHANGE UP (ref 30–115)
ALT FLD-CCNC: 60 U/L — HIGH (ref 0–41)
ANION GAP SERPL CALC-SCNC: 14 MMOL/L — SIGNIFICANT CHANGE UP (ref 7–14)
AST SERPL-CCNC: 53 U/L — HIGH (ref 0–41)
BASOPHILS # BLD AUTO: 0.05 K/UL — SIGNIFICANT CHANGE UP (ref 0–0.2)
BASOPHILS NFR BLD AUTO: 0.9 % — SIGNIFICANT CHANGE UP (ref 0–1)
BILIRUB SERPL-MCNC: 0.5 MG/DL — SIGNIFICANT CHANGE UP (ref 0.2–1.2)
BUN SERPL-MCNC: 21 MG/DL — HIGH (ref 10–20)
CALCIUM SERPL-MCNC: 8.5 MG/DL — SIGNIFICANT CHANGE UP (ref 8.5–10.1)
CHLORIDE SERPL-SCNC: 102 MMOL/L — SIGNIFICANT CHANGE UP (ref 98–110)
CO2 SERPL-SCNC: 18 MMOL/L — SIGNIFICANT CHANGE UP (ref 17–32)
CREAT SERPL-MCNC: 0.7 MG/DL — SIGNIFICANT CHANGE UP (ref 0.7–1.5)
EOSINOPHIL # BLD AUTO: 0.34 K/UL — SIGNIFICANT CHANGE UP (ref 0–0.7)
EOSINOPHIL NFR BLD AUTO: 6.3 % — SIGNIFICANT CHANGE UP (ref 0–8)
GLUCOSE BLDC GLUCOMTR-MCNC: 128 MG/DL — HIGH (ref 70–99)
GLUCOSE BLDC GLUCOMTR-MCNC: 153 MG/DL — HIGH (ref 70–99)
GLUCOSE BLDC GLUCOMTR-MCNC: 169 MG/DL — HIGH (ref 70–99)
GLUCOSE BLDC GLUCOMTR-MCNC: 188 MG/DL — HIGH (ref 70–99)
GLUCOSE SERPL-MCNC: 151 MG/DL — HIGH (ref 70–99)
HCT VFR BLD CALC: 42.4 % — SIGNIFICANT CHANGE UP (ref 42–52)
HGB BLD-MCNC: 14.3 G/DL — SIGNIFICANT CHANGE UP (ref 14–18)
IMM GRANULOCYTES NFR BLD AUTO: 0.9 % — HIGH (ref 0.1–0.3)
LYMPHOCYTES # BLD AUTO: 1.5 K/UL — SIGNIFICANT CHANGE UP (ref 1.2–3.4)
LYMPHOCYTES # BLD AUTO: 27.9 % — SIGNIFICANT CHANGE UP (ref 20.5–51.1)
MAGNESIUM SERPL-MCNC: 2 MG/DL — SIGNIFICANT CHANGE UP (ref 1.8–2.4)
MCHC RBC-ENTMCNC: 30.6 PG — SIGNIFICANT CHANGE UP (ref 27–31)
MCHC RBC-ENTMCNC: 33.7 G/DL — SIGNIFICANT CHANGE UP (ref 32–37)
MCV RBC AUTO: 90.8 FL — SIGNIFICANT CHANGE UP (ref 80–94)
MONOCYTES # BLD AUTO: 0.73 K/UL — HIGH (ref 0.1–0.6)
MONOCYTES NFR BLD AUTO: 13.6 % — HIGH (ref 1.7–9.3)
NEUTROPHILS # BLD AUTO: 2.7 K/UL — SIGNIFICANT CHANGE UP (ref 1.4–6.5)
NEUTROPHILS NFR BLD AUTO: 50.4 % — SIGNIFICANT CHANGE UP (ref 42.2–75.2)
NRBC # BLD: 0 /100 WBCS — SIGNIFICANT CHANGE UP (ref 0–0)
PLATELET # BLD AUTO: 205 K/UL — SIGNIFICANT CHANGE UP (ref 130–400)
POTASSIUM SERPL-MCNC: 4 MMOL/L — SIGNIFICANT CHANGE UP (ref 3.5–5)
POTASSIUM SERPL-SCNC: 4 MMOL/L — SIGNIFICANT CHANGE UP (ref 3.5–5)
PROT SERPL-MCNC: 6.4 G/DL — SIGNIFICANT CHANGE UP (ref 6–8)
RBC # BLD: 4.67 M/UL — LOW (ref 4.7–6.1)
RBC # FLD: 12.4 % — SIGNIFICANT CHANGE UP (ref 11.5–14.5)
SODIUM SERPL-SCNC: 134 MMOL/L — LOW (ref 135–146)
WBC # BLD: 5.37 K/UL — SIGNIFICANT CHANGE UP (ref 4.8–10.8)
WBC # FLD AUTO: 5.37 K/UL — SIGNIFICANT CHANGE UP (ref 4.8–10.8)

## 2021-10-09 PROCEDURE — 99232 SBSQ HOSP IP/OBS MODERATE 35: CPT

## 2021-10-09 RX ADMIN — HEPARIN SODIUM 5000 UNIT(S): 5000 INJECTION INTRAVENOUS; SUBCUTANEOUS at 05:49

## 2021-10-09 RX ADMIN — Medication 2: at 11:30

## 2021-10-09 RX ADMIN — Medication 25 MILLIGRAM(S): at 17:54

## 2021-10-09 RX ADMIN — ATORVASTATIN CALCIUM 40 MILLIGRAM(S): 80 TABLET, FILM COATED ORAL at 23:05

## 2021-10-09 RX ADMIN — ESCITALOPRAM OXALATE 10 MILLIGRAM(S): 10 TABLET, FILM COATED ORAL at 12:39

## 2021-10-09 RX ADMIN — Medication 20 MILLIGRAM(S): at 17:55

## 2021-10-09 RX ADMIN — Medication 15 MILLIGRAM(S): at 05:48

## 2021-10-09 RX ADMIN — CHLORHEXIDINE GLUCONATE 1 APPLICATION(S): 213 SOLUTION TOPICAL at 05:48

## 2021-10-09 RX ADMIN — HEPARIN SODIUM 5000 UNIT(S): 5000 INJECTION INTRAVENOUS; SUBCUTANEOUS at 23:01

## 2021-10-09 RX ADMIN — Medication 5 MILLIGRAM(S): at 23:03

## 2021-10-09 RX ADMIN — Medication 81 MILLIGRAM(S): at 12:38

## 2021-10-09 RX ADMIN — Medication 50 MILLIGRAM(S): at 17:54

## 2021-10-09 RX ADMIN — Medication 2: at 08:54

## 2021-10-09 RX ADMIN — OXCARBAZEPINE 300 MILLIGRAM(S): 300 TABLET, FILM COATED ORAL at 17:56

## 2021-10-09 RX ADMIN — Medication 25 MILLIGRAM(S): at 05:48

## 2021-10-09 RX ADMIN — Medication 20 MILLIGRAM(S): at 05:48

## 2021-10-09 RX ADMIN — HEPARIN SODIUM 5000 UNIT(S): 5000 INJECTION INTRAVENOUS; SUBCUTANEOUS at 14:55

## 2021-10-09 RX ADMIN — LEVETIRACETAM 750 MILLIGRAM(S): 250 TABLET, FILM COATED ORAL at 05:48

## 2021-10-09 RX ADMIN — Medication 50 MILLIGRAM(S): at 05:48

## 2021-10-09 RX ADMIN — OXCARBAZEPINE 300 MILLIGRAM(S): 300 TABLET, FILM COATED ORAL at 05:48

## 2021-10-09 RX ADMIN — CLOPIDOGREL BISULFATE 75 MILLIGRAM(S): 75 TABLET, FILM COATED ORAL at 12:38

## 2021-10-09 RX ADMIN — PREGABALIN 1000 MICROGRAM(S): 225 CAPSULE ORAL at 23:02

## 2021-10-09 RX ADMIN — LEVETIRACETAM 750 MILLIGRAM(S): 250 TABLET, FILM COATED ORAL at 17:54

## 2021-10-09 NOTE — PROGRESS NOTE ADULT - SUBJECTIVE AND OBJECTIVE BOX
ANN BLANCO  71y  Male      Patient is a 71y old  Male who presents with a chief complaint of LE weakness (08 Oct 2021 17:02)      INTERVAL HPI/OVERNIGHT EVENTS: strength slowly improving. walking c walker and assistance. slightly forgetful but redirectable as prior      REVIEW OF SYSTEMS:  limited as above, denies breakthrough seizure events. no new focal neurologic complaints  All other review of systems negative    T(C): 36.2 (10-09-21 @ 13:46), Max: 36.5 (10-08-21 @ 19:14)  HR: 63 (10-09-21 @ 13:46) (61 - 75)  BP: 146/70 (10-09-21 @ 13:46) (146/70 - 168/79)  RR: 18 (10-09-21 @ 04:35) (18 - 18)  SpO2: --  Wt(kg): --Vital Signs Last 24 Hrs  T(C): 36.2 (09 Oct 2021 13:46), Max: 36.5 (08 Oct 2021 19:14)  T(F): 97.2 (09 Oct 2021 13:46), Max: 97.7 (08 Oct 2021 19:14)  HR: 63 (09 Oct 2021 13:46) (61 - 75)  BP: 146/70 (09 Oct 2021 13:46) (146/70 - 168/79)  BP(mean): --  RR: 18 (09 Oct 2021 04:35) (18 - 18)  SpO2: --        PHYSICAL EXAM:  GENERAL: NAD, deconditioned  PSYCH: no agitation, baseline mentation, forgetful  NERVOUS SYSTEM:  Alert & Oriented X3, no new focal deficits, slight delay in responding/ but responds appropriately, str 5/5 b/l, sensory intact  PULMONARY: Clear to percussion bilaterally; No rales, rhonchi, wheezing, or rubs  CARDIOVASCULAR: Regular rate and rhythm; No murmurs, rubs, or gallops  GI: Soft, Nontender, Nondistended; Bowel sounds present rotund  EXTREMITIES:  2+ Peripheral Pulses, No clubbing, cyanosis, or edema    Consultant(s) Notes Reviewed:  [x ] YES  [ ] NO    Discussed with Consultants/Other Providers [ x] YES     LABS                          14.3   5.37  )-----------( 205      ( 09 Oct 2021 05:47 )             42.4     10-09    134<L>  |  102  |  21<H>  ----------------------------<  151<H>  4.0   |  18  |  0.7    Ca    8.5      09 Oct 2021 05:47  Mg     2.0     10-09    TPro  6.4  /  Alb  3.4<L>  /  TBili  0.5  /  DBili  x   /  AST  53<H>  /  ALT  60<H>  /  AlkPhos  57  10-09          Lactate Trend        CAPILLARY BLOOD GLUCOSE      POCT Blood Glucose.: 188 mg/dL (09 Oct 2021 11:32)        RADIOLOGY & ADDITIONAL TESTS:    Imaging Personally Reviewed:  [ ] YES  [ ] NO    HEALTH ISSUES - PROBLEM Dx:

## 2021-10-09 NOTE — PROGRESS NOTE ADULT - ASSESSMENT
71y M c PMHc cva/ partial seizures, kendra, dm2, unsteady gait, bph recent diarrhea p/w worsening LE weakness likely 2/2 multilevel lumbar degenerative disease on backdrop of scoliosis/ worsening physical debility , marginally low b12 lvl    f/u keppra lvl now 10.6 therapeutic range  MR spine results  cspine:  1.  Mild degenerative changes superimposed upon a congenitally narrow cervical spinal canal with multilevel mild-moderate spinal stenosis without cord compression.    2.  Moderate left foraminal narrowing at C4-5 and C5-6    Tspine:  No evidence of cord compression or cord signal abnormality. Mild degenerative changes.    L spine: motion artifact/ cancelled --->   lumbar XRays show:  5 non-rib bearing lumbar-type vertebrae are visualized. Lumbar levoscoliosis.    No acute displaced fracture. The vertebral body heights are maintained. Trace anterolisthesis of L5 on S1.    Moderate multilevel degenerative disc disease with bony endplate degenerative changes, facet arthropathy, and bridging anterior osteophytes    f/u MMA lvl- pending result-> possible b12 repletion if consistent c b12 deficiency  ambulating with PT and walker, standby supervision; not as steady as prior to chief complaint and hospitalization, but much improved from admission  prior diarrhea has since resolved  appreciated neuro f/u, agree that in light of clinical improvement proceed c PT/OT, reasonable to forgo LP as likelihood of Guillain bare is extremely low  resumed home plavix  reorient when a bit confused    encouraged outpatient f/u c Dr. Kobe pena, following snf rehab      Provider Hand off  Pending- snf auth for rehab  Family discussion- my team members have discussed with sons on several occasions including today regarding our suspected diagnosis and imaging findings. plan for snf pending auth/ medically stable for discharge  Disposition- snf + auth

## 2021-10-10 LAB
ALBUMIN SERPL ELPH-MCNC: 3.6 G/DL — SIGNIFICANT CHANGE UP (ref 3.5–5.2)
ALP SERPL-CCNC: 67 U/L — SIGNIFICANT CHANGE UP (ref 30–115)
ALT FLD-CCNC: 69 U/L — HIGH (ref 0–41)
ANION GAP SERPL CALC-SCNC: 16 MMOL/L — HIGH (ref 7–14)
AST SERPL-CCNC: 60 U/L — HIGH (ref 0–41)
BASOPHILS # BLD AUTO: 0.07 K/UL — SIGNIFICANT CHANGE UP (ref 0–0.2)
BASOPHILS NFR BLD AUTO: 1.3 % — HIGH (ref 0–1)
BILIRUB SERPL-MCNC: 0.5 MG/DL — SIGNIFICANT CHANGE UP (ref 0.2–1.2)
BUN SERPL-MCNC: 21 MG/DL — HIGH (ref 10–20)
CALCIUM SERPL-MCNC: 8.9 MG/DL — SIGNIFICANT CHANGE UP (ref 8.5–10.1)
CHLORIDE SERPL-SCNC: 100 MMOL/L — SIGNIFICANT CHANGE UP (ref 98–110)
CO2 SERPL-SCNC: 21 MMOL/L — SIGNIFICANT CHANGE UP (ref 17–32)
CREAT SERPL-MCNC: 0.8 MG/DL — SIGNIFICANT CHANGE UP (ref 0.7–1.5)
EOSINOPHIL # BLD AUTO: 0.33 K/UL — SIGNIFICANT CHANGE UP (ref 0–0.7)
EOSINOPHIL NFR BLD AUTO: 5.9 % — SIGNIFICANT CHANGE UP (ref 0–8)
GLUCOSE BLDC GLUCOMTR-MCNC: 114 MG/DL — HIGH (ref 70–99)
GLUCOSE BLDC GLUCOMTR-MCNC: 155 MG/DL — HIGH (ref 70–99)
GLUCOSE BLDC GLUCOMTR-MCNC: 175 MG/DL — HIGH (ref 70–99)
GLUCOSE BLDC GLUCOMTR-MCNC: 189 MG/DL — HIGH (ref 70–99)
GLUCOSE SERPL-MCNC: 148 MG/DL — HIGH (ref 70–99)
HCT VFR BLD CALC: 44.1 % — SIGNIFICANT CHANGE UP (ref 42–52)
HGB BLD-MCNC: 14.5 G/DL — SIGNIFICANT CHANGE UP (ref 14–18)
IMM GRANULOCYTES NFR BLD AUTO: 1.3 % — HIGH (ref 0.1–0.3)
LYMPHOCYTES # BLD AUTO: 1.71 K/UL — SIGNIFICANT CHANGE UP (ref 1.2–3.4)
LYMPHOCYTES # BLD AUTO: 30.6 % — SIGNIFICANT CHANGE UP (ref 20.5–51.1)
MAGNESIUM SERPL-MCNC: 2 MG/DL — SIGNIFICANT CHANGE UP (ref 1.8–2.4)
MCHC RBC-ENTMCNC: 30.5 PG — SIGNIFICANT CHANGE UP (ref 27–31)
MCHC RBC-ENTMCNC: 32.9 G/DL — SIGNIFICANT CHANGE UP (ref 32–37)
MCV RBC AUTO: 92.8 FL — SIGNIFICANT CHANGE UP (ref 80–94)
MONOCYTES # BLD AUTO: 0.55 K/UL — SIGNIFICANT CHANGE UP (ref 0.1–0.6)
MONOCYTES NFR BLD AUTO: 9.9 % — HIGH (ref 1.7–9.3)
NEUTROPHILS # BLD AUTO: 2.85 K/UL — SIGNIFICANT CHANGE UP (ref 1.4–6.5)
NEUTROPHILS NFR BLD AUTO: 51 % — SIGNIFICANT CHANGE UP (ref 42.2–75.2)
NRBC # BLD: 0 /100 WBCS — SIGNIFICANT CHANGE UP (ref 0–0)
PLATELET # BLD AUTO: 235 K/UL — SIGNIFICANT CHANGE UP (ref 130–400)
POTASSIUM SERPL-MCNC: 4.1 MMOL/L — SIGNIFICANT CHANGE UP (ref 3.5–5)
POTASSIUM SERPL-SCNC: 4.1 MMOL/L — SIGNIFICANT CHANGE UP (ref 3.5–5)
PROT SERPL-MCNC: 6.8 G/DL — SIGNIFICANT CHANGE UP (ref 6–8)
RBC # BLD: 4.75 M/UL — SIGNIFICANT CHANGE UP (ref 4.7–6.1)
RBC # FLD: 12.4 % — SIGNIFICANT CHANGE UP (ref 11.5–14.5)
SODIUM SERPL-SCNC: 137 MMOL/L — SIGNIFICANT CHANGE UP (ref 135–146)
WBC # BLD: 5.58 K/UL — SIGNIFICANT CHANGE UP (ref 4.8–10.8)
WBC # FLD AUTO: 5.58 K/UL — SIGNIFICANT CHANGE UP (ref 4.8–10.8)

## 2021-10-10 RX ADMIN — Medication 5 MILLIGRAM(S): at 21:20

## 2021-10-10 RX ADMIN — Medication 25 MILLIGRAM(S): at 05:21

## 2021-10-10 RX ADMIN — LEVETIRACETAM 750 MILLIGRAM(S): 250 TABLET, FILM COATED ORAL at 18:12

## 2021-10-10 RX ADMIN — Medication 2: at 11:27

## 2021-10-10 RX ADMIN — Medication 2: at 17:15

## 2021-10-10 RX ADMIN — Medication 50 MILLIGRAM(S): at 18:12

## 2021-10-10 RX ADMIN — Medication 20 MILLIGRAM(S): at 05:21

## 2021-10-10 RX ADMIN — CLOPIDOGREL BISULFATE 75 MILLIGRAM(S): 75 TABLET, FILM COATED ORAL at 11:29

## 2021-10-10 RX ADMIN — LEVETIRACETAM 750 MILLIGRAM(S): 250 TABLET, FILM COATED ORAL at 05:21

## 2021-10-10 RX ADMIN — OXCARBAZEPINE 300 MILLIGRAM(S): 300 TABLET, FILM COATED ORAL at 18:13

## 2021-10-10 RX ADMIN — ESCITALOPRAM OXALATE 10 MILLIGRAM(S): 10 TABLET, FILM COATED ORAL at 11:29

## 2021-10-10 RX ADMIN — Medication 15 MILLIGRAM(S): at 05:21

## 2021-10-10 RX ADMIN — OXCARBAZEPINE 300 MILLIGRAM(S): 300 TABLET, FILM COATED ORAL at 05:21

## 2021-10-10 RX ADMIN — Medication 2: at 08:20

## 2021-10-10 RX ADMIN — Medication 25 MILLIGRAM(S): at 18:12

## 2021-10-10 RX ADMIN — Medication 50 MILLIGRAM(S): at 05:21

## 2021-10-10 RX ADMIN — HEPARIN SODIUM 5000 UNIT(S): 5000 INJECTION INTRAVENOUS; SUBCUTANEOUS at 21:20

## 2021-10-10 RX ADMIN — Medication 20 MILLIGRAM(S): at 18:12

## 2021-10-10 RX ADMIN — HEPARIN SODIUM 5000 UNIT(S): 5000 INJECTION INTRAVENOUS; SUBCUTANEOUS at 05:21

## 2021-10-10 RX ADMIN — PREGABALIN 1000 MICROGRAM(S): 225 CAPSULE ORAL at 21:20

## 2021-10-10 RX ADMIN — Medication 81 MILLIGRAM(S): at 11:29

## 2021-10-10 RX ADMIN — HEPARIN SODIUM 5000 UNIT(S): 5000 INJECTION INTRAVENOUS; SUBCUTANEOUS at 14:43

## 2021-10-10 RX ADMIN — ATORVASTATIN CALCIUM 40 MILLIGRAM(S): 80 TABLET, FILM COATED ORAL at 21:20

## 2021-10-10 RX ADMIN — CHLORHEXIDINE GLUCONATE 1 APPLICATION(S): 213 SOLUTION TOPICAL at 05:21

## 2021-10-10 NOTE — PROGRESS NOTE ADULT - SUBJECTIVE AND OBJECTIVE BOX
ANN BLANCO  71y, Male  Allergy: No Known Allergies    Hospital Day: 8d    Patient seen and examined earlier today. Feeling well, no complaints, pending rehab.     PMH/PSH:  PAST MEDICAL & SURGICAL HISTORY:  Nonintractable epilepsy without status epilepticus, unspecified epilepsy type    Neuropathy    Hypertension, unspecified type    Type 2 diabetes mellitus with complication, without long-term current use of insulin    Anxiety    Cerebrovascular accident (CVA), unspecified mechanism    History of surgery  BROKEN LEG SURGERY    Capsular cataract of right eye        LAST 24-Hr EVENTS:    VITALS:  T(F): 97.2 (10-10-21 @ 14:14), Max: 97.6 (10-09-21 @ 23:06)  HR: 79 (10-10-21 @ 14:14)  BP: 133/67 (10-10-21 @ 14:14) (133/67 - 155/75)  RR: 18 (10-10-21 @ 14:14)  SpO2: 100% (10-10-21 @ 05:19)        TESTS & MEASUREMENTS:  Weight (Kg):                             14.5   5.58  )-----------( 235      ( 10 Oct 2021 06:01 )             44.1       10-10    137  |  100  |  21<H>  ----------------------------<  148<H>  4.1   |  21  |  0.8    Ca    8.9      10 Oct 2021 06:01  Mg     2.0     10-10    TPro  6.8  /  Alb  3.6  /  TBili  0.5  /  DBili  x   /  AST  60<H>  /  ALT  69<H>  /  AlkPhos  67  10-10    LIVER FUNCTIONS - ( 10 Oct 2021 06:01 )  Alb: 3.6 g/dL / Pro: 6.8 g/dL / ALK PHOS: 67 U/L / ALT: 69 U/L / AST: 60 U/L / GGT: x                     D-Dimer Assay, Quantitative: 64 ng/mL DDU (10-02-21 @ 11:00)      Serum Pro-Brain Natriuretic Peptide: 1142 pg/mL (10-02-21 @ 11:00)        RADIOLOGY, ECG, & ADDITIONAL TESTS:      RECENT DIAGNOSTIC ORDERS:      MEDICATIONS:  MEDICATIONS  (STANDING):  aspirin  chewable 81 milliGRAM(s) Oral daily  atorvastatin 40 milliGRAM(s) Oral at bedtime  busPIRone 15 milliGRAM(s) Oral <User Schedule>  chlorhexidine 4% Liquid 1 Application(s) Topical <User Schedule>  clopidogrel Tablet 75 milliGRAM(s) Oral daily  cyanocobalamin 1000 MICROGram(s) Oral <User Schedule>  dextrose 40% Gel 15 Gram(s) Oral once  dextrose 5%. 1000 milliLiter(s) (50 mL/Hr) IV Continuous <Continuous>  dextrose 5%. 1000 milliLiter(s) (100 mL/Hr) IV Continuous <Continuous>  dextrose 50% Injectable 25 Gram(s) IV Push once  dextrose 50% Injectable 12.5 Gram(s) IV Push once  dextrose 50% Injectable 25 Gram(s) IV Push once  enalapril 20 milliGRAM(s) Oral two times a day  escitalopram 10 milliGRAM(s) Oral daily  glucagon  Injectable 1 milliGRAM(s) IntraMuscular once  heparin   Injectable 5000 Unit(s) SubCutaneous every 8 hours  hydrALAZINE 25 milliGRAM(s) Oral two times a day  insulin lispro (ADMELOG) corrective regimen sliding scale   SubCutaneous three times a day before meals  levETIRAcetam 750 milliGRAM(s) Oral two times a day  lidocaine 1% Injectable 10 milliLiter(s) Local Injection once  LORazepam   Injectable 1 milliGRAM(s) IV Push once  melatonin 5 milliGRAM(s) Oral at bedtime  metoprolol tartrate 50 milliGRAM(s) Oral two times a day  OXcarbazepine 300 milliGRAM(s) Oral two times a day    MEDICATIONS  (PRN):      HOME MEDICATIONS:  aspirin 81 mg oral tablet (02-20)  busPIRone 15 mg oral tablet (10-02)  enalapril 20 mg oral tablet (10-02)  escitalopram 20 mg oral tablet (10-02)  gabapentin 400 mg oral tablet (10-02)  Keppra 750 mg oral tablet (10-02)  Lipitor 40 mg oral tablet (02-20)  metFORMIN 750 mg oral tablet, extended release (10-02)  metoprolol succinate 50 mg oral tablet, extended release (10-02)  OXcarbazepine 300 mg oral tablet, extended release (10-02)  oxybutynin 15 mg/24 hr oral tablet, extended release (10-02)  Plavix 75 mg oral tablet (02-20)      PHYSICAL EXAM:  GENERAL: A&O x3,  in NAD  HNENT: EOMI, PERRLA    NECK: No LAD/swelling  CHEST/LUNG:  CTAB no wheezes/rales/ronchi  HEART: RRR, No murmurs  ABDOMEN: Soft, NT, ND,  Bowel sounds present  EXTREMITIES:  Warm well perfused, trace edema   MSK: RLE 4/5 strenght, 3/5 strength LLE

## 2021-10-10 NOTE — PROGRESS NOTE ADULT - ASSESSMENT
71 year old male with PMHx of CVA, partial seizures ADAIR, DM, DLD, BPH, gait disorder (mainly mechanical) followed by neuro outpatient presenting to the ED for generalized weakness and multiple falls at home. CT head was negative for any acute pathology, orthostatic vitals were wnl, ECHO 10/03 revealed EF 50-55% and ACS r/o. Thyroid function test was wnl and syphilis test was negative. Pt had MRI of C and T spine which was negative for any acute pathology. XR of lumbar spine revealed: Moderate degenerative changes of the lumbar spine. Pt evaluated by Neurology, no further inpatient workup needed.       #LE Weakness likely 2/2 Lumbar Radiculopathy   - CT Head negative on 10/2, orthostatic vitals WNL  - LP attempted 10/4 by medical team as well as Dr. Brannon (Neurology Attending), unsuccessful  - pt clinically improving and low suspicion for GBS so LP cancelled   - MRI C/T w/ DJD and mild-mod stenosis , XR L spine w/ mod DJD   - B12 mildly low, f/u MMA( pending as of 10/5 ), TSH WNL, RPR neg  - PT consulted, recommending rehab  - Neuro s/o , f/u OP     #PMHx of CVA - no residual symptoms, on Aspirin/Plavix/Atorvastatin     #Partial Seizures-keppra level 5.8--> 10.6;  continue Keppra 750 bid     #HTN - controlled; continuing with enalapril and metoprolol and hydralazine     #DM -A1c 7.0 on 10/4, SSI for now    #ADAIR- CPAP @ night     #DLD- continuing with atorvastatin    #BPH #Urge Incontinence - continuing with oxybutynin and oxcarbazepine      Heparin SQ     #Progress Note Handoff:  Pending (specify):  dc pending rehab placement , f/u MMA   Family discussion: d/w pt   Disposition: Home___/SNF___/Other________/Unknown at this time____x____      Maria Luisa Bardales, DO

## 2021-10-11 LAB
GLUCOSE BLDC GLUCOMTR-MCNC: 147 MG/DL — HIGH (ref 70–99)
GLUCOSE BLDC GLUCOMTR-MCNC: 169 MG/DL — HIGH (ref 70–99)
GLUCOSE BLDC GLUCOMTR-MCNC: 188 MG/DL — HIGH (ref 70–99)
GLUCOSE BLDC GLUCOMTR-MCNC: 191 MG/DL — HIGH (ref 70–99)

## 2021-10-11 PROCEDURE — 99232 SBSQ HOSP IP/OBS MODERATE 35: CPT

## 2021-10-11 RX ADMIN — Medication 15 MILLIGRAM(S): at 05:44

## 2021-10-11 RX ADMIN — Medication 50 MILLIGRAM(S): at 17:31

## 2021-10-11 RX ADMIN — CHLORHEXIDINE GLUCONATE 1 APPLICATION(S): 213 SOLUTION TOPICAL at 05:44

## 2021-10-11 RX ADMIN — PREGABALIN 1000 MICROGRAM(S): 225 CAPSULE ORAL at 22:03

## 2021-10-11 RX ADMIN — Medication 50 MILLIGRAM(S): at 05:44

## 2021-10-11 RX ADMIN — OXCARBAZEPINE 300 MILLIGRAM(S): 300 TABLET, FILM COATED ORAL at 17:31

## 2021-10-11 RX ADMIN — LEVETIRACETAM 750 MILLIGRAM(S): 250 TABLET, FILM COATED ORAL at 05:44

## 2021-10-11 RX ADMIN — LEVETIRACETAM 750 MILLIGRAM(S): 250 TABLET, FILM COATED ORAL at 17:30

## 2021-10-11 RX ADMIN — Medication 20 MILLIGRAM(S): at 17:30

## 2021-10-11 RX ADMIN — HEPARIN SODIUM 5000 UNIT(S): 5000 INJECTION INTRAVENOUS; SUBCUTANEOUS at 15:14

## 2021-10-11 RX ADMIN — HEPARIN SODIUM 5000 UNIT(S): 5000 INJECTION INTRAVENOUS; SUBCUTANEOUS at 05:44

## 2021-10-11 RX ADMIN — ATORVASTATIN CALCIUM 40 MILLIGRAM(S): 80 TABLET, FILM COATED ORAL at 22:02

## 2021-10-11 RX ADMIN — CLOPIDOGREL BISULFATE 75 MILLIGRAM(S): 75 TABLET, FILM COATED ORAL at 12:25

## 2021-10-11 RX ADMIN — HEPARIN SODIUM 5000 UNIT(S): 5000 INJECTION INTRAVENOUS; SUBCUTANEOUS at 22:02

## 2021-10-11 RX ADMIN — ESCITALOPRAM OXALATE 10 MILLIGRAM(S): 10 TABLET, FILM COATED ORAL at 12:25

## 2021-10-11 RX ADMIN — Medication 2: at 17:28

## 2021-10-11 RX ADMIN — Medication 81 MILLIGRAM(S): at 12:25

## 2021-10-11 RX ADMIN — Medication 20 MILLIGRAM(S): at 05:44

## 2021-10-11 RX ADMIN — Medication 2: at 12:12

## 2021-10-11 RX ADMIN — Medication 25 MILLIGRAM(S): at 17:29

## 2021-10-11 RX ADMIN — Medication 25 MILLIGRAM(S): at 05:44

## 2021-10-11 RX ADMIN — Medication 5 MILLIGRAM(S): at 22:02

## 2021-10-11 RX ADMIN — OXCARBAZEPINE 300 MILLIGRAM(S): 300 TABLET, FILM COATED ORAL at 05:45

## 2021-10-11 NOTE — PROGRESS NOTE ADULT - ASSESSMENT
72 y/o man with PMH of CVA, partial seizures, ADAIR, HTN, DM type 2, unsteady gait, BPH and recent diarrhea presented with worsening LE weakness likely due to multilevel lumbar degenerative disease on backdrop of scoliosis/ worsening physical debility and marginally low B12 level after workup this admission.     1. LE weakness  overall improved and pt is able to ambulate with RW and staff  s/p MRIs of thoracic and cervical spine and Xray of L/S spine  likely due to DDD after workup  diarrhea now resolved  appreciated neuro f/u:  in light of clinical improvement proceed with PT/OT and reasonable to forgo LP as likelihood of Guillain barre is extremely low  vitamin B12 227 - f/u MMA level - agree with supplementation for now  outpt neuro f/u  awaiting authorization for STR at SNF  fall precautions  DVT prophylaxis    2. h/o CVA on ASA/Plavix/statin    3. Partial seizures - on AEDs    4. HTN - on enalapril, hydralazine and metoprolol    5. DM type 2 - FS acceptable - insulin for FS >180    6. ADAIR on CPAP      PROGRESS NOTE HANDOFF    Pending: authorization for SNF  MMA level    pt aware of plan of care    Disposition: SNF

## 2021-10-11 NOTE — PROGRESS NOTE ADULT - REASON FOR ADMISSION
unsteady gait LE weakness
Generalized Weakness, Gait Disorder
Generalized weakness
LE weakness
LE weakness
b/l LE weakness
unsteady gait
generalized weakness

## 2021-10-11 NOTE — PROGRESS NOTE ADULT - SUBJECTIVE AND OBJECTIVE BOX
Hospital Day:  9d    Subjective: Patient is a 71y old  Male who presents with a chief complaint of unsteady gait (09 Oct 2021 15:14)      Pt seen and evaluated at bedside.   Complaints: increase urinary frequency over night, denies dysuria   Over the night Events: None    Past Medical Hx:   Nonintractable epilepsy without status epilepticus, unspecified epilepsy type    Neuropathy    Hypertension, unspecified type    Type 2 diabetes mellitus with complication, without long-term current use of insulin    Anxiety    Cerebrovascular accident (CVA), unspecified mechanism      Past Sx:  History of surgery    Capsular cataract of right eye      Allergies:  No Known Allergies    Current Meds:   Standng Meds:  aspirin  chewable 81 milliGRAM(s) Oral daily  atorvastatin 40 milliGRAM(s) Oral at bedtime  busPIRone 15 milliGRAM(s) Oral <User Schedule>  chlorhexidine 4% Liquid 1 Application(s) Topical <User Schedule>  clopidogrel Tablet 75 milliGRAM(s) Oral daily  cyanocobalamin 1000 MICROGram(s) Oral <User Schedule>  dextrose 40% Gel 15 Gram(s) Oral once  dextrose 5%. 1000 milliLiter(s) (50 mL/Hr) IV Continuous <Continuous>  dextrose 5%. 1000 milliLiter(s) (100 mL/Hr) IV Continuous <Continuous>  dextrose 50% Injectable 25 Gram(s) IV Push once  dextrose 50% Injectable 12.5 Gram(s) IV Push once  dextrose 50% Injectable 25 Gram(s) IV Push once  enalapril 20 milliGRAM(s) Oral two times a day  escitalopram 10 milliGRAM(s) Oral daily  glucagon  Injectable 1 milliGRAM(s) IntraMuscular once  heparin   Injectable 5000 Unit(s) SubCutaneous every 8 hours  hydrALAZINE 25 milliGRAM(s) Oral two times a day  insulin lispro (ADMELOG) corrective regimen sliding scale   SubCutaneous three times a day before meals  levETIRAcetam 750 milliGRAM(s) Oral two times a day  lidocaine 1% Injectable 10 milliLiter(s) Local Injection once  LORazepam   Injectable 1 milliGRAM(s) IV Push once  melatonin 5 milliGRAM(s) Oral at bedtime  metoprolol tartrate 50 milliGRAM(s) Oral two times a day  OXcarbazepine 300 milliGRAM(s) Oral two times a day    PRN Meds:      Vital Signs:   T(F): 96.8 (10-11-21 @ 20:24), Max: 96.8 (10-11-21 @ 20:24)  HR: 62 (10-11-21 @ 20:24) (62 - 72)  BP: 131/73 (10-11-21 @ 20:24) (131/73 - 161/79)  RR: 18 (10-11-21 @ 20:24) (18 - 20)  SpO2: 98% (10-11-21 @ 04:17) (98% - 98%)    Physical Exam:   GENERAL: NAD, Resting in bed  HEENT: NCAT  CHEST/LUNG: Clear to auscultation bilaterally; No wheezing or rubs.   HEART: Regular rate and rhythm; No murmurs, rubs, or gallops  ABDOMEN: Bowel sounds present; Soft, Nontender, Nondistended.   EXTREMITIES:  No clubbing, cyanosis, or edema  NERVOUS SYSTEM:  Alert & Oriented X3    FLUID BALANCE      Labs:                         14.5   5.58  )-----------( 235      ( 10 Oct 2021 06:01 )             44.1       10 Oct 2021 06:01    137    |  100    |  21     ----------------------------<  148    4.1     |  21     |  0.8      Ca    8.9        10 Oct 2021 06:01  Mg     2.0       10 Oct 2021 06:01    TPro  6.8    /  Alb  3.6    /  TBili  0.5    /  DBili  x      /  AST  60     /  ALT  69     /  AlkPhos  67     10 Oct 2021 06:01            Serum Pro-Brain Natriuretic Peptide: 1142 pg/mL (10-02-21 @ 11:00)                            Radiology:

## 2021-10-11 NOTE — PROGRESS NOTE ADULT - ASSESSMENT
71 year old male with PMHx of CVA, partial seizures ADAIR, DM, DLD, BPH, gait disorder (mainly mechanical) followed by neuro outpatient presenting to the ED for generalized weakness. Pt states over the past few weeks, he has been unable to "tie his shoelaces and put on his jeans, because it makes him feel SOB. Denies dyspnea when walking up the stairs or walking his dog. States that he feels a lot more wobbly than usual and has fallen multiple times, most recent episode last night at home.  States that he sees the neurologist Dr Bullock o/p. Says he is compliant with all his medications and lives with his son Rosalino. Has a cane for walking but does not use it.     #Generalized weakness and gait disorder  #Guillain-Barré - bilateral LE weakness, recent of diarrhea- r/o  - CT Head negative on 10/2, orthostatic vitals WNL  - LP attempted 10/4 by medical team as well as Dr. rBannon (Neurology Attending), unsuccessful  - IR consulted for LP- consult canceled since pt LE weakness improved and its reasonable to forgo LP as likelihood of Guillain bare is extremely low  - B12 low,f/u MMA then replenish, TSH WNL, RPR NG  - PT consulted, recommending rehab  -MRI C/T/L Spine w/o Con- reviewed   -Neuro recs noted    #Episodic shortness of breath - ACS ruled out  - Troponin 0.02 on admission ==> 0.03 ==> 0.01, BNP 1142  - patient continues to deny chest pain, no b/l LE edema  - TTE 10/03: Left ventricular ejection fraction, by visual estimation, is 50 to 55%   - CXR in ED negative for acute cardiopulmonary disease     #PMHx of CVA   - no residual symptoms, on Aspirin/Plavix, holding Plavix as per IR for LP    #Partial Seizures  - keppra level 5.8  - Neuro following  - continuing home dose for now    #HTN   - continuing with enalapril and metoprolol     #DM   - A1c 7.0 on 10/4  - SSI for now    #ADAIR  - CPAP @ night     #DLD  - continuing with atorvastatin    #BPH  #Urge Incontinence   - continuing with oxybutynin and oxcarbazepine                                                                             ----------------------------------------------------  # DVT prophylaxis: Heparin subQ 5000U subQ q8h  # GI prophylaxis: N/A  # Diet: Consistent Carbs + DASH/TLC  # Activity: OOB to chair  # Code status: Full  # Disposition: STR                                                                           --------------------------------------------------------  # Handoff: pending Rehab placement

## 2021-10-11 NOTE — PROGRESS NOTE ADULT - SUBJECTIVE AND OBJECTIVE BOX
ANN BLANCO  71y Male    INTERVAL HPI/OVERNIGHT EVENTS:    sitting in a chair  ambulated to the bathroom with staff and RW    T(F): 95.9 (10-11-21 @ 12:58), Max: 96.9 (10-10-21 @ 20:00)  HR: 65 (10-11-21 @ 12:58) (62 - 72)  BP: 161/79 (10-11-21 @ 12:58) (129/72 - 161/79)  RR: 18 (10-11-21 @ 12:58) (18 - 20)  SpO2: 98% (10-11-21 @ 04:17) (97% - 98%) on RA  I&O's Summary    CAPILLARY BLOOD GLUCOSE      POCT Blood Glucose.: 188 mg/dL (11 Oct 2021 16:29)  POCT Blood Glucose.: 169 mg/dL (11 Oct 2021 11:14)  POCT Blood Glucose.: 147 mg/dL (11 Oct 2021 07:26)  POCT Blood Glucose.: 114 mg/dL (10 Oct 2021 21:09)        PHYSICAL EXAM:  GENERAL: NAD  HEAD:  Normocephalic  EYES:  conjunctiva and sclera clear  ENMT: Moist mucous membranes  NERVOUS SYSTEM:  Alert, awake, Good concentration  moves LE 4/5 b/l  CHEST/LUNG: CTA b/l  HEART: Regular rate and rhythm  ABDOMEN: Soft, Nontender, Nondistended; Bowel sounds present  EXTREMITIES:   No edema      Consultant(s) Notes Reviewed:  [x ] YES  [ ] NO  Care Discussed with Consultants/Other Providers [ x] YES  [ ] NO    MEDICATIONS  (STANDING):  aspirin  chewable 81 milliGRAM(s) Oral daily  atorvastatin 40 milliGRAM(s) Oral at bedtime  busPIRone 15 milliGRAM(s) Oral <User Schedule>  chlorhexidine 4% Liquid 1 Application(s) Topical <User Schedule>  clopidogrel Tablet 75 milliGRAM(s) Oral daily  cyanocobalamin 1000 MICROGram(s) Oral <User Schedule>  dextrose 40% Gel 15 Gram(s) Oral once  dextrose 5%. 1000 milliLiter(s) (50 mL/Hr) IV Continuous <Continuous>  dextrose 5%. 1000 milliLiter(s) (100 mL/Hr) IV Continuous <Continuous>  dextrose 50% Injectable 25 Gram(s) IV Push once  dextrose 50% Injectable 12.5 Gram(s) IV Push once  dextrose 50% Injectable 25 Gram(s) IV Push once  enalapril 20 milliGRAM(s) Oral two times a day  escitalopram 10 milliGRAM(s) Oral daily  glucagon  Injectable 1 milliGRAM(s) IntraMuscular once  heparin   Injectable 5000 Unit(s) SubCutaneous every 8 hours  hydrALAZINE 25 milliGRAM(s) Oral two times a day  insulin lispro (ADMELOG) corrective regimen sliding scale   SubCutaneous three times a day before meals  levETIRAcetam 750 milliGRAM(s) Oral two times a day  lidocaine 1% Injectable 10 milliLiter(s) Local Injection once  LORazepam   Injectable 1 milliGRAM(s) IV Push once  melatonin 5 milliGRAM(s) Oral at bedtime  metoprolol tartrate 50 milliGRAM(s) Oral two times a day  OXcarbazepine 300 milliGRAM(s) Oral two times a day    MEDICATIONS  (PRN):      LABS:                        14.5   5.58  )-----------( 235      ( 10 Oct 2021 06:01 )             44.1     10-10    137  |  100  |  21<H>  ----------------------------<  148<H>  4.1   |  21  |  0.8    Ca    8.9      10 Oct 2021 06:01  Mg     2.0     10-10    TPro  6.8  /  Alb  3.6  /  TBili  0.5  /  DBili  x   /  AST  60<H>  /  ALT  69<H>  /  AlkPhos  67  10-10            Case discussed with residents and RN on rounds today    Care discussed with pt

## 2021-10-12 ENCOUNTER — TRANSCRIPTION ENCOUNTER (OUTPATIENT)
Age: 71
End: 2021-10-12

## 2021-10-12 VITALS
RESPIRATION RATE: 20 BRPM | DIASTOLIC BLOOD PRESSURE: 76 MMHG | TEMPERATURE: 96 F | SYSTOLIC BLOOD PRESSURE: 164 MMHG | HEART RATE: 67 BPM

## 2021-10-12 LAB
ALBUMIN SERPL ELPH-MCNC: 3.5 G/DL — SIGNIFICANT CHANGE UP (ref 3.5–5.2)
ALP SERPL-CCNC: 60 U/L — SIGNIFICANT CHANGE UP (ref 30–115)
ALT FLD-CCNC: 64 U/L — HIGH (ref 0–41)
ANION GAP SERPL CALC-SCNC: 13 MMOL/L — SIGNIFICANT CHANGE UP (ref 7–14)
AST SERPL-CCNC: 42 U/L — HIGH (ref 0–41)
B BURGDOR DNA SPEC QL NAA+PROBE: NEGATIVE — SIGNIFICANT CHANGE UP
BASOPHILS # BLD AUTO: 0.07 K/UL — SIGNIFICANT CHANGE UP (ref 0–0.2)
BASOPHILS NFR BLD AUTO: 1.2 % — HIGH (ref 0–1)
BILIRUB SERPL-MCNC: 0.4 MG/DL — SIGNIFICANT CHANGE UP (ref 0.2–1.2)
BUN SERPL-MCNC: 23 MG/DL — HIGH (ref 10–20)
CALCIUM SERPL-MCNC: 8.7 MG/DL — SIGNIFICANT CHANGE UP (ref 8.5–10.1)
CHLORIDE SERPL-SCNC: 103 MMOL/L — SIGNIFICANT CHANGE UP (ref 98–110)
CO2 SERPL-SCNC: 21 MMOL/L — SIGNIFICANT CHANGE UP (ref 17–32)
CREAT SERPL-MCNC: 0.9 MG/DL — SIGNIFICANT CHANGE UP (ref 0.7–1.5)
EOSINOPHIL # BLD AUTO: 0.26 K/UL — SIGNIFICANT CHANGE UP (ref 0–0.7)
EOSINOPHIL NFR BLD AUTO: 4.3 % — SIGNIFICANT CHANGE UP (ref 0–8)
GLUCOSE BLDC GLUCOMTR-MCNC: 147 MG/DL — HIGH (ref 70–99)
GLUCOSE BLDC GLUCOMTR-MCNC: 173 MG/DL — HIGH (ref 70–99)
GLUCOSE SERPL-MCNC: 135 MG/DL — HIGH (ref 70–99)
HCT VFR BLD CALC: 40.7 % — LOW (ref 42–52)
HGB BLD-MCNC: 13.5 G/DL — LOW (ref 14–18)
IMM GRANULOCYTES NFR BLD AUTO: 1.2 % — HIGH (ref 0.1–0.3)
LYMPHOCYTES # BLD AUTO: 2.06 K/UL — SIGNIFICANT CHANGE UP (ref 1.2–3.4)
LYMPHOCYTES # BLD AUTO: 34.2 % — SIGNIFICANT CHANGE UP (ref 20.5–51.1)
MAGNESIUM SERPL-MCNC: 2.1 MG/DL — SIGNIFICANT CHANGE UP (ref 1.8–2.4)
MCHC RBC-ENTMCNC: 30.5 PG — SIGNIFICANT CHANGE UP (ref 27–31)
MCHC RBC-ENTMCNC: 33.2 G/DL — SIGNIFICANT CHANGE UP (ref 32–37)
MCV RBC AUTO: 92.1 FL — SIGNIFICANT CHANGE UP (ref 80–94)
METHYLMALONATE SERPL-SCNC: 304 NMOL/L — SIGNIFICANT CHANGE UP (ref 0–378)
MONOCYTES # BLD AUTO: 0.66 K/UL — HIGH (ref 0.1–0.6)
MONOCYTES NFR BLD AUTO: 11 % — HIGH (ref 1.7–9.3)
NEUTROPHILS # BLD AUTO: 2.9 K/UL — SIGNIFICANT CHANGE UP (ref 1.4–6.5)
NEUTROPHILS NFR BLD AUTO: 48.1 % — SIGNIFICANT CHANGE UP (ref 42.2–75.2)
NRBC # BLD: 0 /100 WBCS — SIGNIFICANT CHANGE UP (ref 0–0)
PLATELET # BLD AUTO: 262 K/UL — SIGNIFICANT CHANGE UP (ref 130–400)
POTASSIUM SERPL-MCNC: 4.3 MMOL/L — SIGNIFICANT CHANGE UP (ref 3.5–5)
POTASSIUM SERPL-SCNC: 4.3 MMOL/L — SIGNIFICANT CHANGE UP (ref 3.5–5)
PROT SERPL-MCNC: 6.4 G/DL — SIGNIFICANT CHANGE UP (ref 6–8)
RBC # BLD: 4.42 M/UL — LOW (ref 4.7–6.1)
RBC # FLD: 12.4 % — SIGNIFICANT CHANGE UP (ref 11.5–14.5)
SODIUM SERPL-SCNC: 137 MMOL/L — SIGNIFICANT CHANGE UP (ref 135–146)
WBC # BLD: 6.02 K/UL — SIGNIFICANT CHANGE UP (ref 4.8–10.8)
WBC # FLD AUTO: 6.02 K/UL — SIGNIFICANT CHANGE UP (ref 4.8–10.8)

## 2021-10-12 PROCEDURE — 99239 HOSP IP/OBS DSCHRG MGMT >30: CPT

## 2021-10-12 RX ORDER — GABAPENTIN 400 MG/1
1 CAPSULE ORAL
Qty: 0 | Refills: 0 | DISCHARGE

## 2021-10-12 RX ORDER — PREGABALIN 225 MG/1
1 CAPSULE ORAL
Qty: 0 | Refills: 0 | DISCHARGE
Start: 2021-10-12

## 2021-10-12 RX ORDER — PREGABALIN 225 MG/1
1 CAPSULE ORAL
Qty: 30 | Refills: 0
Start: 2021-10-12

## 2021-10-12 RX ORDER — HYDRALAZINE HCL 50 MG
1 TABLET ORAL
Qty: 60 | Refills: 0
Start: 2021-10-12 | End: 2021-11-10

## 2021-10-12 RX ADMIN — LEVETIRACETAM 750 MILLIGRAM(S): 250 TABLET, FILM COATED ORAL at 06:00

## 2021-10-12 RX ADMIN — CHLORHEXIDINE GLUCONATE 1 APPLICATION(S): 213 SOLUTION TOPICAL at 06:00

## 2021-10-12 RX ADMIN — Medication 20 MILLIGRAM(S): at 06:00

## 2021-10-12 RX ADMIN — Medication 81 MILLIGRAM(S): at 12:09

## 2021-10-12 RX ADMIN — Medication 2: at 12:08

## 2021-10-12 RX ADMIN — Medication 15 MILLIGRAM(S): at 06:00

## 2021-10-12 RX ADMIN — Medication 50 MILLIGRAM(S): at 06:00

## 2021-10-12 RX ADMIN — HEPARIN SODIUM 5000 UNIT(S): 5000 INJECTION INTRAVENOUS; SUBCUTANEOUS at 06:00

## 2021-10-12 RX ADMIN — Medication 25 MILLIGRAM(S): at 06:00

## 2021-10-12 RX ADMIN — OXCARBAZEPINE 300 MILLIGRAM(S): 300 TABLET, FILM COATED ORAL at 06:00

## 2021-10-12 RX ADMIN — Medication 0: at 08:47

## 2021-10-12 RX ADMIN — CLOPIDOGREL BISULFATE 75 MILLIGRAM(S): 75 TABLET, FILM COATED ORAL at 12:09

## 2021-10-12 RX ADMIN — ESCITALOPRAM OXALATE 10 MILLIGRAM(S): 10 TABLET, FILM COATED ORAL at 12:09

## 2021-10-12 NOTE — PROGRESS NOTE ADULT - SUBJECTIVE AND OBJECTIVE BOX
Discharge note    ANN BLANCO  71y Male    INTERVAL HPI/OVERNIGHT EVENTS:    pt did well with PT today - ambulating with RW and supervision and did some steps.  no complaints.   wants to go home     T(F): 96.1 (10-12-21 @ 05:00), Max: 96.8 (10-11-21 @ 20:24)  HR: 65 (10-12-21 @ 05:00) (62 - 65)  BP: 152/69 (10-12-21 @ 05:00) (131/73 - 152/69)  RR: 18 (10-12-21 @ 05:00) (18 - 18)  SpO2: 97% (10-12-21 @ 05:00) (97% - 97%) on RA    I&O's Summary    CAPILLARY BLOOD GLUCOSE      POCT Blood Glucose.: 173 mg/dL (12 Oct 2021 11:37)  POCT Blood Glucose.: 147 mg/dL (12 Oct 2021 07:38)  POCT Blood Glucose.: 191 mg/dL (11 Oct 2021 21:09)  POCT Blood Glucose.: 188 mg/dL (11 Oct 2021 16:29)        PHYSICAL EXAM:  GENERAL: NAD  HEAD:  Normocephalic  EYES:  conjunctiva and sclera clear  ENMT: Moist mucous membranes  NERVOUS SYSTEM:  Alert, awake, Good concentration  CHEST/LUNG: CTA b/l  HEART: Regular rate and rhythm  ABDOMEN: Soft, Nontender, Nondistended  EXTREMITIES:   No edema    Consultant(s) Notes Reviewed:  [x ] YES  [ ] NO  Care Discussed with Consultants/Other Providers [ x] YES  [ ] NO    MEDICATIONS  (STANDING):  aspirin  chewable 81 milliGRAM(s) Oral daily  atorvastatin 40 milliGRAM(s) Oral at bedtime  busPIRone 15 milliGRAM(s) Oral <User Schedule>  chlorhexidine 4% Liquid 1 Application(s) Topical <User Schedule>  clopidogrel Tablet 75 milliGRAM(s) Oral daily  cyanocobalamin 1000 MICROGram(s) Oral <User Schedule>  dextrose 40% Gel 15 Gram(s) Oral once  dextrose 5%. 1000 milliLiter(s) (50 mL/Hr) IV Continuous <Continuous>  dextrose 5%. 1000 milliLiter(s) (100 mL/Hr) IV Continuous <Continuous>  dextrose 50% Injectable 25 Gram(s) IV Push once  dextrose 50% Injectable 25 Gram(s) IV Push once  dextrose 50% Injectable 12.5 Gram(s) IV Push once  enalapril 20 milliGRAM(s) Oral two times a day  escitalopram 10 milliGRAM(s) Oral daily  glucagon  Injectable 1 milliGRAM(s) IntraMuscular once  heparin   Injectable 5000 Unit(s) SubCutaneous every 8 hours  hydrALAZINE 25 milliGRAM(s) Oral two times a day  insulin lispro (ADMELOG) corrective regimen sliding scale   SubCutaneous three times a day before meals  levETIRAcetam 750 milliGRAM(s) Oral two times a day  lidocaine 1% Injectable 10 milliLiter(s) Local Injection once  LORazepam   Injectable 1 milliGRAM(s) IV Push once  melatonin 5 milliGRAM(s) Oral at bedtime  metoprolol tartrate 50 milliGRAM(s) Oral two times a day  OXcarbazepine 300 milliGRAM(s) Oral two times a day    MEDICATIONS  (PRN):      LABS:                        13.5   6.02  )-----------( 262      ( 12 Oct 2021 05:18 )             40.7     10-12    137  |  103  |  23<H>  ----------------------------<  135<H>  4.3   |  21  |  0.9    Ca    8.7      12 Oct 2021 05:18  Mg     2.1     10-12    TPro  6.4  /  Alb  3.5  /  TBili  0.4  /  DBili  x   /  AST  42<H>  /  ALT  64<H>  /  AlkPhos  60  10-12            Case discussed with residents and RN on rounds today    Care discussed with pt

## 2021-10-12 NOTE — DIETITIAN INITIAL EVALUATION ADULT. - NAME AND PHONE
RD to monitor: diet order, body composition, energy intake, nutrition focused physical finding, glucose profile. not at risk consult nutrition prn

## 2021-10-12 NOTE — DIETITIAN INITIAL EVALUATION ADULT. - ORAL INTAKE PTA/DIET HISTORY
Patient reports good appetite pta. No factors affecting appetite. Does not follow any specific diet. Son cooks food for pt. Does not take any vitamins/ nutrition oral supplements. NKFA. No cultural, Latter-day, ethnic preferences.

## 2021-10-12 NOTE — PROGRESS NOTE ADULT - PROVIDER SPECIALTY LIST ADULT
Internal Medicine
Internal Medicine
Hospitalist
Internal Medicine
Hospitalist
Internal Medicine
Internal Medicine

## 2021-10-12 NOTE — DISCHARGE NOTE NURSING/CASE MANAGEMENT/SOCIAL WORK - PATIENT PORTAL LINK FT
You can access the FollowMyHealth Patient Portal offered by [facility] by registering at the following website: [email or web address]. By joining [facility]’s FollowMyHealth portal, you will also be able to view your health information using other applications (apps) compatible with our system.

## 2021-10-12 NOTE — PROGRESS NOTE ADULT - ASSESSMENT
70 y/o man with PMH of CVA, partial seizures, ADAIR, HTN, DM type 2, unsteady gait, BPH and recent diarrhea presented with worsening LE weakness likely due to multilevel lumbar degenerative disease on backdrop of scoliosis/ worsening physical debility and marginally low B12 level after workup this admission.     1. LE weakness  overall improved and pt is able to ambulate with RW and staff  s/p MRIs of thoracic and cervical spine and Xray of L/S spine  likely due to DDD after workup  diarrhea now resolved  appreciated neuro f/u:  in light of clinical improvement proceed with PT/OT and reasonable to forgo LP as likelihood of Guillain barre is extremely low  vitamin B12 227 - f/u MMA level - agree with supplementation for now  outpt neuro f/u  since pt is ambulating better, he can go home with services now  fall precautions  DVT prophylaxis    2. h/o CVA on ASA/Plavix/statin    3. Partial seizures - on AEDs    4. HTN - on enalapril, hydralazine and metoprolol    5. DM type 2 - FS acceptable     6. ADAIR on CPAP        medication reconciliation and discharge papers reviewed     70 y/o man with PMH of CVA, partial seizures, ADAIR, HTN, DM type 2, unsteady gait, BPH and recent diarrhea presented with worsening LE weakness likely due to multilevel lumbar degenerative disease on backdrop of scoliosis/ worsening physical debility and marginally low B12 level after workup this admission.     1. LE weakness  overall improved and pt is able to ambulate with RW and staff  s/p MRIs of thoracic and cervical spine and Xray of L/S spine  likely due to DDD after workup  diarrhea now resolved  appreciated neuro f/u:  in light of clinical improvement proceed with PT/OT and reasonable to forgo LP as likelihood of Guillain barre is extremely low  vitamin B12 227 - f/u MMA level - agree with supplementation for now  outpt neuro f/u  since pt is ambulating better, he can go home with services now  fall precautions  DVT prophylaxis    2. h/o CVA on ASA/Plavix/statin    3. Partial seizures - on AEDs    4. HTN - on enalapril, hydralazine and metoprolol    5. DM type 2 - FS acceptable     6. ADAIR on CPAP        medication reconciliation and discharge papers reviewed    Addendum:  case discussed with pt's son Dimas today with the pt present.  Pt wants to go home with services - he does not want STR at SNF.   Dimas is comfortable taking the pt home today.  Home services arranged  Spoke to pt and son re: life alert since pt is home alone at times.  Pt needs supervision with ambulation and stairs - stressed to the son.      Spent 40 minutes on the discharge process of this pt

## 2021-10-12 NOTE — DIETITIAN INITIAL EVALUATION ADULT. - PERTINENT MEDS FT
MEDICATIONS  (STANDING):    atorvastatin 40 milliGRAM(s) Oral at bedtime  cyanocobalamin 1000 MICROGram(s) Oral <User Schedule>  dextrose 40% Gel 15 Gram(s) Oral once  dextrose 5%. 1000 milliLiter(s) (50 mL/Hr) IV Continuous <Continuous>  dextrose 5%. 1000 milliLiter(s) (100 mL/Hr) IV Continuous <Continuous>  dextrose 50% Injectable 25 Gram(s) IV Push once  dextrose 50% Injectable 12.5 Gram(s) IV Push once  dextrose 50% Injectable 25 Gram(s) IV Push once  enalapril 20 milliGRAM(s) Oral two times a day  escitalopram 10 milliGRAM(s) Oral daily  glucagon  Injectable 1 milliGRAM(s) IntraMuscular once  hydrALAZINE 25 milliGRAM(s) Oral two times a day  insulin lispro (ADMELOG) corrective regimen sliding scale   SubCutaneous three times a day before meals  LORazepam   Injectable 1 milliGRAM(s) IV Push onc  metoprolol tartrate 50 milliGRAM(s) Oral two times a day

## 2021-10-12 NOTE — DIETITIAN INITIAL EVALUATION ADULT. - PHYSCIAL ASSESSMENT
alert and oriented, however pt reports forgetting a lot/obese skin intact/ no edema noted  no gastrointestinal signs/symptoms, LBM: 10/9 per RN flow sheets  No chewing/swallowing difficulties at this time

## 2021-10-12 NOTE — DIETITIAN INITIAL EVALUATION ADULT. - OTHER INFO
Pertinent medical information: 71 year old male with PMHx of CVA, partial seizures ADAIR, DM, DLD, BPH, gait disorder (mainly mechanical) followed by neuro outpatient presenting to the ED for generalized weakness. Pt states over the past few weeks. Per internal medicine note, Generalized weakness and gait disorder, Guillain-Barré - bilateral LE weakness, recent of diarrhea- r/o. Episodic shortness of breath - ACS ruled out- pending rehab placement.    Pertinent nutrition information: Reports PO intake ~100% of meals. No factors affecting appetite at this time

## 2021-10-18 ENCOUNTER — APPOINTMENT (OUTPATIENT)
Dept: NEUROLOGY | Facility: CLINIC | Age: 71
End: 2021-10-18
Payer: MEDICARE

## 2021-10-18 PROCEDURE — 99214 OFFICE O/P EST MOD 30 MIN: CPT

## 2021-10-18 NOTE — ASSESSMENT
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

## 2021-10-18 NOTE — PHYSICAL EXAM
[FreeTextEntry1] : A/A/Ox3\par hard of hearing\par good mood\par slightly tired\par good attention\par normal; lsnguage\par CN- normal\par ++ left fixation and decreased fine motor\par decreased ROM of the neck\par stable /hesitant., mildly left paretic gait\par + difficulty standing up \par

## 2021-10-18 NOTE — HISTORY OF PRESENT ILLNESS
[FreeTextEntry1] : Ric is here for the F/U.\par On Oct. 8th , he was seen in the ED. This was after he felt weak < in the back and the legs> when he wanted to get out of the bed. He denies feeling dizzy and or having weakness in the UE . No confusion. no difficulty with speaking. His son who was with him on that day  took him to the ED . He was admitted \par MRI of the T-spine was done showing no significant issues, Te C-Spine showed mild Degenerative disease a congenital narrowing and also multi level mild to moderate spinal stenosis..It also showed C4-6 left foraminal disease.\par CT of the head showed no acute event and evidence foe lacunar infarcts within the Rt. frontal and genu of the CC.\par He was discharged with walker.\par He lost about 10 pounds.\par He says he is using the C-PAP mask more often and is getting used to it. Has less daytime sleepiness.\par His mood and memory are good\par No Seizures\par No events suggestive of seizure \par Blood pressure and diabetes is under better control

## 2021-10-19 ENCOUNTER — APPOINTMENT (OUTPATIENT)
Dept: UROLOGY | Facility: CLINIC | Age: 71
End: 2021-10-19
Payer: MEDICARE

## 2021-10-19 VITALS — BODY MASS INDEX: 38.57 KG/M2 | WEIGHT: 240 LBS | HEIGHT: 66 IN

## 2021-10-19 PROCEDURE — 99214 OFFICE O/P EST MOD 30 MIN: CPT

## 2021-10-19 NOTE — END OF VISIT
[FreeTextEntry3] : I participated in obtaining history, physical exam, formulated treatment plan, discussed with the patient and agree with the above transcription by the physicians assistant

## 2021-10-19 NOTE — ASSESSMENT
[FreeTextEntry1] : Discussed with patient further treatment options for urinary symptoms including PTNS and Botox. \par Given patient limited mobility and history of BPH, patient has higher risk of urinary retention with Botox. Patient is made aware of this risk. Patient is aware that botox would be done in OR under General Anesthesia. Given risks, patient does not want to proceed with Botox at this time. \par As for PTNS, patient is aware that a needle will be placed by his tibial nerve by his ankle. Patient is aware that he will need to present to office once a week for 12 weeks to receive treatment. Patient is aware that his Urinary symptoms will be monitored weekly. Patient verbalized understanding and wishes to proceed with PTNS. \par \par Plan\par -Schedule PTNS.

## 2021-10-19 NOTE — PHYSICAL EXAM
[Well Groomed] : well groomed [General Appearance - In No Acute Distress] : no acute distress [] : no respiratory distress [Oriented To Time, Place, And Person] : oriented to person, place, and time [FreeTextEntry1] : slow steady gait with assistance of cane

## 2021-10-19 NOTE — HISTORY OF PRESENT ILLNESS
[FreeTextEntry1] : This is a 71 year old male who presents for consultation for bladder relaxant treatments. \par Patient has history of CVA, partial seizures, ADAIR, HTN, DM type 2, unsteady gait, BPH. Patient was recently hospitalized for lower extremity weakness and degenerative lumbar disease. \par Patient has chief complaint of daytime frequency, urgency, urge incontinence, and 3x nocturia. Patient denies any dysuria and gross hematuria. Patient has had Urodynamics on 02/2021 which revealed marked decrease in his bladder capacity. Patient was unable to hold his urine after a significant bladder contraction which led to voiding at about 70 cc. Unable to measure bladder outlet obstruction.\par Patient has tried Oxybutynin 15 mg daily which has not improved his symptoms. Patient previously tried toviaz without any improvement. \par \par Patient reports he feels well. Denies fever, chest pain, shortness of breathe.

## 2021-10-21 ENCOUNTER — RX RENEWAL (OUTPATIENT)
Age: 71
End: 2021-10-21

## 2021-11-05 ENCOUNTER — APPOINTMENT (OUTPATIENT)
Dept: UROLOGY | Facility: CLINIC | Age: 71
End: 2021-11-05
Payer: MEDICARE

## 2021-11-05 VITALS — HEIGHT: 66 IN | BODY MASS INDEX: 38.57 KG/M2 | WEIGHT: 240 LBS

## 2021-11-05 PROCEDURE — 64566 NEUROELTRD STIM POST TIBIAL: CPT

## 2021-11-12 ENCOUNTER — APPOINTMENT (OUTPATIENT)
Dept: UROLOGY | Facility: CLINIC | Age: 71
End: 2021-11-12
Payer: MEDICARE

## 2021-11-12 VITALS — HEIGHT: 66 IN | WEIGHT: 240 LBS | BODY MASS INDEX: 38.57 KG/M2

## 2021-11-12 PROCEDURE — 64566 NEUROELTRD STIM POST TIBIAL: CPT

## 2021-11-24 ENCOUNTER — APPOINTMENT (OUTPATIENT)
Dept: UROLOGY | Facility: CLINIC | Age: 71
End: 2021-11-24

## 2021-12-03 ENCOUNTER — APPOINTMENT (OUTPATIENT)
Dept: UROLOGY | Facility: CLINIC | Age: 71
End: 2021-12-03
Payer: MEDICARE

## 2021-12-03 VITALS — WEIGHT: 240 LBS | BODY MASS INDEX: 38.57 KG/M2 | HEIGHT: 66 IN

## 2021-12-03 PROCEDURE — 64566 NEUROELTRD STIM POST TIBIAL: CPT

## 2021-12-10 ENCOUNTER — APPOINTMENT (OUTPATIENT)
Dept: UROLOGY | Facility: CLINIC | Age: 71
End: 2021-12-10
Payer: MEDICARE

## 2021-12-10 VITALS — HEIGHT: 66 IN | BODY MASS INDEX: 38.57 KG/M2 | WEIGHT: 240 LBS

## 2021-12-10 PROCEDURE — 64566 NEUROELTRD STIM POST TIBIAL: CPT

## 2021-12-17 ENCOUNTER — APPOINTMENT (OUTPATIENT)
Dept: UROLOGY | Facility: CLINIC | Age: 71
End: 2021-12-17
Payer: MEDICARE

## 2021-12-17 VITALS — WEIGHT: 240 LBS | BODY MASS INDEX: 38.57 KG/M2 | HEIGHT: 66 IN

## 2021-12-17 PROCEDURE — 64566 NEUROELTRD STIM POST TIBIAL: CPT

## 2022-01-07 ENCOUNTER — APPOINTMENT (OUTPATIENT)
Dept: UROLOGY | Facility: CLINIC | Age: 72
End: 2022-01-07

## 2022-01-14 ENCOUNTER — APPOINTMENT (OUTPATIENT)
Dept: UROLOGY | Facility: CLINIC | Age: 72
End: 2022-01-14
Payer: MEDICARE

## 2022-01-14 VITALS — BODY MASS INDEX: 38.57 KG/M2 | WEIGHT: 240 LBS | HEIGHT: 66 IN

## 2022-01-14 PROCEDURE — 64566 NEUROELTRD STIM POST TIBIAL: CPT

## 2022-01-21 ENCOUNTER — APPOINTMENT (OUTPATIENT)
Dept: UROLOGY | Facility: CLINIC | Age: 72
End: 2022-01-21
Payer: MEDICARE

## 2022-01-21 VITALS — HEIGHT: 66 IN | WEIGHT: 240 LBS | BODY MASS INDEX: 38.57 KG/M2

## 2022-01-21 PROCEDURE — 64566 NEUROELTRD STIM POST TIBIAL: CPT

## 2022-01-28 ENCOUNTER — APPOINTMENT (OUTPATIENT)
Dept: UROLOGY | Facility: CLINIC | Age: 72
End: 2022-01-28
Payer: MEDICARE

## 2022-01-28 VITALS — BODY MASS INDEX: 38.57 KG/M2 | HEIGHT: 66 IN | WEIGHT: 240 LBS

## 2022-01-28 PROCEDURE — 64566 NEUROELTRD STIM POST TIBIAL: CPT

## 2022-02-04 ENCOUNTER — APPOINTMENT (OUTPATIENT)
Dept: UROLOGY | Facility: CLINIC | Age: 72
End: 2022-02-04
Payer: MEDICARE

## 2022-02-04 VITALS — BODY MASS INDEX: 38.57 KG/M2 | HEIGHT: 66 IN | WEIGHT: 240 LBS | TEMPERATURE: 97 F

## 2022-02-04 PROCEDURE — 64566 NEUROELTRD STIM POST TIBIAL: CPT

## 2022-02-10 NOTE — PATIENT PROFILE ADULT - NSPROPTRIGHTREPNAME_GEN_A__NUR
Behavioral Health IP Nursing Progress Note    Suicidal Ideation:  Patient denies     Current C-SSRS score: Negative screen= no ideation, behaviors or history (02/10/22 1442)      Protective Factors / Reason for Living: Compliance with medications, Responsibility to children, Social supports, Positive therapeutic relationships    Interventions:   Implemented the Safety / Recovery Plan    Other Interventions Implemented:  Visual inspection of patient's environment completed. Items removed: none  Routine safety checks    Subjective: Patient reported he was feeling edgy this morning due to there being two therapists so the schedule was altered from the previous days.  Patient expressed confusion that he was not able to attend his check-in group at 1030 as per usual. Patient was difficult to redirect to his room while group was in session.    Objective:   Mental Health: Patient behavior observed to be preoccupied at times, anxious, requiring some redirection.     Medical:   Pain     Assessment / Actions:   PRN Medications given?   Yes. Patient Response: PRN ibuprofen administered for ankle pain with relief reported upon reassessment.    Plan:   Treatment Plan reviewed.     Cale Rowe

## 2022-02-11 ENCOUNTER — APPOINTMENT (OUTPATIENT)
Dept: UROLOGY | Facility: CLINIC | Age: 72
End: 2022-02-11
Payer: MEDICARE

## 2022-02-11 VITALS — HEIGHT: 66 IN | BODY MASS INDEX: 38.57 KG/M2 | WEIGHT: 240 LBS

## 2022-02-11 PROCEDURE — 64566 NEUROELTRD STIM POST TIBIAL: CPT

## 2022-02-18 ENCOUNTER — APPOINTMENT (OUTPATIENT)
Dept: UROLOGY | Facility: CLINIC | Age: 72
End: 2022-02-18
Payer: MEDICARE

## 2022-02-18 VITALS — WEIGHT: 240 LBS | BODY MASS INDEX: 38.57 KG/M2 | HEIGHT: 66 IN

## 2022-02-18 PROCEDURE — 64566 NEUROELTRD STIM POST TIBIAL: CPT

## 2022-02-25 ENCOUNTER — APPOINTMENT (OUTPATIENT)
Dept: UROLOGY | Facility: CLINIC | Age: 72
End: 2022-02-25
Payer: MEDICARE

## 2022-02-25 PROCEDURE — 64566 NEUROELTRD STIM POST TIBIAL: CPT

## 2022-03-25 ENCOUNTER — APPOINTMENT (OUTPATIENT)
Dept: UROLOGY | Facility: CLINIC | Age: 72
End: 2022-03-25
Payer: MEDICARE

## 2022-03-25 VITALS — WEIGHT: 240 LBS | BODY MASS INDEX: 38.57 KG/M2 | HEIGHT: 66 IN

## 2022-03-25 PROCEDURE — 99213 OFFICE O/P EST LOW 20 MIN: CPT

## 2022-03-25 NOTE — HISTORY OF PRESENT ILLNESS
[FreeTextEntry1] : History of frequency urgency urgency incontinence and nocturia 1 month status post finishing weekly PTNS treatments.  He continues to say he is improved.  No urinary incontinence, nocturia x1, no daytime frequency or urgency.  Patient feels he can live with the urinary situation at this time

## 2022-03-25 NOTE — REVIEW OF SYSTEMS
[Fever] : no fever [Sore Throat] : no sore throat [Chest Pain] : no chest pain [Confused] : no confusion

## 2022-03-25 NOTE — ASSESSMENT
[FreeTextEntry1] : Patient feels like he can live with the urinary situation as is.  Recommend yearly follow-up and if symptoms return then follow-up sooner

## 2022-04-01 ENCOUNTER — RX RENEWAL (OUTPATIENT)
Age: 72
End: 2022-04-01

## 2022-04-19 ENCOUNTER — APPOINTMENT (OUTPATIENT)
Dept: NEUROLOGY | Facility: CLINIC | Age: 72
End: 2022-04-19
Payer: MEDICARE

## 2022-04-19 VITALS
WEIGHT: 225 LBS | HEART RATE: 87 BPM | DIASTOLIC BLOOD PRESSURE: 70 MMHG | OXYGEN SATURATION: 96 % | HEIGHT: 66 IN | TEMPERATURE: 97.6 F | SYSTOLIC BLOOD PRESSURE: 130 MMHG | BODY MASS INDEX: 36.16 KG/M2

## 2022-04-19 PROCEDURE — 99213 OFFICE O/P EST LOW 20 MIN: CPT

## 2022-04-19 NOTE — HISTORY OF PRESENT ILLNESS
[FreeTextEntry1] : Ric is here for the F/U\par He is at his baseline and appears lost couple of pounds.\par He denies having had any seizures.\par He is mainly bothered and fruste rated by his mechanics and ability to go out and walk\par He goes to the GYM and started the PT\par He says because of his limitations he feels blue and does not feel that he has a purpose. His wife is in the long term NH\par He gets daily meals from meals on the wheels.\par He is not wearing his C-PAP and in fact never went back to the pulmonary .\par He does not remember who  was the pulmonologist.\par He also switched his primary MD.\par denies spinal /joint pain\par Has had no falls

## 2022-04-19 NOTE — ASSESSMENT
[FreeTextEntry1] : 1-ADAIR\par 2- mood D/O.\par 3-Partial epilepsy\par 4- gait D/O mainly due to the spinal stenosis\par \par \par plan\par continue the AED\par level\par F/U

## 2022-04-19 NOTE — PHYSICAL EXAM
[FreeTextEntry1] : A/A/Ox3\par sleepy and tired\par good mood\par good attention\par good knowledge of the current events\par Follows 4 step commands\par no drift\par no dysmetria\par wide, hesitant gait stable without the cane\par able to stand up without using his hand

## 2022-05-09 ENCOUNTER — APPOINTMENT (OUTPATIENT)
Age: 72
End: 2022-05-09
Payer: MEDICARE

## 2022-05-09 VITALS
DIASTOLIC BLOOD PRESSURE: 88 MMHG | OXYGEN SATURATION: 97 % | HEIGHT: 66 IN | WEIGHT: 224 LBS | BODY MASS INDEX: 36 KG/M2 | HEART RATE: 67 BPM | RESPIRATION RATE: 14 BRPM | SYSTOLIC BLOOD PRESSURE: 132 MMHG

## 2022-05-09 PROCEDURE — 71046 X-RAY EXAM CHEST 2 VIEWS: CPT

## 2022-05-09 PROCEDURE — 99213 OFFICE O/P EST LOW 20 MIN: CPT | Mod: 25

## 2022-05-09 NOTE — HISTORY OF PRESENT ILLNESS
[Follow-Up - Routine Clinic] : a routine clinic follow-up of [Excessive Daytime Sleepiness] : excessive daytime sleepiness [Snoring] : snoring [Unrefreshing Sleep] : unrefreshing sleep [Currently Experiencing] : The patient is currently experiencing symptoms. [None] : No associated symptoms are reported [BiPAP] : BiPAP [Good Compliance] : good compliance with treatment [Poor Tolerance] : poor tolerance of treatment [Fair Symptom Control] : fair symptom control [Poor Symptom Control] : poor symptom control [Shortness of Breath] : Shortness of Breath

## 2022-05-09 NOTE — ASSESSMENT
[FreeTextEntry1] : HO Severe ADAIR did not tolerate BiPAP \par SP 25 Pds weight loss\par COnsidering the Inspire procedure \par Intermittent MAGAÑA

## 2022-05-09 NOTE — PROCEDURE
[FreeTextEntry1] : CXR PA and Lateral \par The costophrenic and cardiophrenic angles are sharp\par The rachell parenchyma shows no infiltrates, consolidations, or nodules \par The Mediastinum is within normal limits\par No pleural effusions\par

## 2022-06-29 ENCOUNTER — OUTPATIENT (OUTPATIENT)
Dept: OUTPATIENT SERVICES | Facility: HOSPITAL | Age: 72
LOS: 1 days | Discharge: HOME | End: 2022-06-29

## 2022-06-29 DIAGNOSIS — H26.9 UNSPECIFIED CATARACT: Chronic | ICD-10-CM

## 2022-06-29 DIAGNOSIS — Z98.890 OTHER SPECIFIED POSTPROCEDURAL STATES: Chronic | ICD-10-CM

## 2022-06-29 PROCEDURE — 95811 POLYSOM 6/>YRS CPAP 4/> PARM: CPT | Mod: 26

## 2022-06-30 DIAGNOSIS — G47.33 OBSTRUCTIVE SLEEP APNEA (ADULT) (PEDIATRIC): ICD-10-CM

## 2022-07-12 ENCOUNTER — RX RENEWAL (OUTPATIENT)
Age: 72
End: 2022-07-12

## 2022-07-20 ENCOUNTER — INPATIENT (INPATIENT)
Facility: HOSPITAL | Age: 72
LOS: 6 days | Discharge: NURSING, REHAB OR LONG-TERM CARE | End: 2022-07-27
Attending: PSYCHIATRY & NEUROLOGY | Admitting: PSYCHIATRY & NEUROLOGY

## 2022-07-20 VITALS
WEIGHT: 240.08 LBS | RESPIRATION RATE: 17 BRPM | OXYGEN SATURATION: 98 % | DIASTOLIC BLOOD PRESSURE: 80 MMHG | TEMPERATURE: 98 F | SYSTOLIC BLOOD PRESSURE: 146 MMHG | HEART RATE: 71 BPM | HEIGHT: 66 IN

## 2022-07-20 DIAGNOSIS — Z98.890 OTHER SPECIFIED POSTPROCEDURAL STATES: Chronic | ICD-10-CM

## 2022-07-20 DIAGNOSIS — H26.9 UNSPECIFIED CATARACT: Chronic | ICD-10-CM

## 2022-07-20 LAB
ALBUMIN SERPL ELPH-MCNC: 3.8 G/DL — SIGNIFICANT CHANGE UP (ref 3.5–5.2)
ALP SERPL-CCNC: 60 U/L — SIGNIFICANT CHANGE UP (ref 30–115)
ALT FLD-CCNC: 14 U/L — SIGNIFICANT CHANGE UP (ref 0–41)
ANION GAP SERPL CALC-SCNC: 13 MMOL/L — SIGNIFICANT CHANGE UP (ref 7–14)
APPEARANCE UR: CLEAR — SIGNIFICANT CHANGE UP
AST SERPL-CCNC: 17 U/L — SIGNIFICANT CHANGE UP (ref 0–41)
BACTERIA # UR AUTO: NEGATIVE — SIGNIFICANT CHANGE UP
BASOPHILS # BLD AUTO: 0.04 K/UL — SIGNIFICANT CHANGE UP (ref 0–0.2)
BASOPHILS NFR BLD AUTO: 0.5 % — SIGNIFICANT CHANGE UP (ref 0–1)
BILIRUB SERPL-MCNC: 0.4 MG/DL — SIGNIFICANT CHANGE UP (ref 0.2–1.2)
BILIRUB UR-MCNC: NEGATIVE — SIGNIFICANT CHANGE UP
BUN SERPL-MCNC: 16 MG/DL — SIGNIFICANT CHANGE UP (ref 10–20)
CALCIUM SERPL-MCNC: 8.7 MG/DL — SIGNIFICANT CHANGE UP (ref 8.5–10.1)
CHLORIDE SERPL-SCNC: 105 MMOL/L — SIGNIFICANT CHANGE UP (ref 98–110)
CO2 SERPL-SCNC: 22 MMOL/L — SIGNIFICANT CHANGE UP (ref 17–32)
COLOR SPEC: YELLOW — SIGNIFICANT CHANGE UP
CREAT SERPL-MCNC: 1 MG/DL — SIGNIFICANT CHANGE UP (ref 0.7–1.5)
DIFF PNL FLD: NEGATIVE — SIGNIFICANT CHANGE UP
EGFR: 80 ML/MIN/1.73M2 — SIGNIFICANT CHANGE UP
EOSINOPHIL # BLD AUTO: 0.05 K/UL — SIGNIFICANT CHANGE UP (ref 0–0.7)
EOSINOPHIL NFR BLD AUTO: 0.6 % — SIGNIFICANT CHANGE UP (ref 0–8)
EPI CELLS # UR: 1 /HPF — SIGNIFICANT CHANGE UP (ref 0–5)
GAS PNL BLDV: SIGNIFICANT CHANGE UP
GLUCOSE SERPL-MCNC: 120 MG/DL — HIGH (ref 70–99)
GLUCOSE UR QL: NEGATIVE — SIGNIFICANT CHANGE UP
HCT VFR BLD CALC: 42.7 % — SIGNIFICANT CHANGE UP (ref 42–52)
HGB BLD-MCNC: 14.8 G/DL — SIGNIFICANT CHANGE UP (ref 14–18)
HYALINE CASTS # UR AUTO: 0 /LPF — SIGNIFICANT CHANGE UP (ref 0–7)
IMM GRANULOCYTES NFR BLD AUTO: 0.3 % — SIGNIFICANT CHANGE UP (ref 0.1–0.3)
KETONES UR-MCNC: NEGATIVE — SIGNIFICANT CHANGE UP
LACTATE SERPL-SCNC: 1.6 MMOL/L — SIGNIFICANT CHANGE UP (ref 0.7–2)
LEUKOCYTE ESTERASE UR-ACNC: NEGATIVE — SIGNIFICANT CHANGE UP
LYMPHOCYTES # BLD AUTO: 1.65 K/UL — SIGNIFICANT CHANGE UP (ref 1.2–3.4)
LYMPHOCYTES # BLD AUTO: 18.6 % — LOW (ref 20.5–51.1)
MAGNESIUM SERPL-MCNC: 1.8 MG/DL — SIGNIFICANT CHANGE UP (ref 1.8–2.4)
MCHC RBC-ENTMCNC: 31.6 PG — HIGH (ref 27–31)
MCHC RBC-ENTMCNC: 34.7 G/DL — SIGNIFICANT CHANGE UP (ref 32–37)
MCV RBC AUTO: 91 FL — SIGNIFICANT CHANGE UP (ref 80–94)
MONOCYTES # BLD AUTO: 0.93 K/UL — HIGH (ref 0.1–0.6)
MONOCYTES NFR BLD AUTO: 10.5 % — HIGH (ref 1.7–9.3)
NEUTROPHILS # BLD AUTO: 6.16 K/UL — SIGNIFICANT CHANGE UP (ref 1.4–6.5)
NEUTROPHILS NFR BLD AUTO: 69.5 % — SIGNIFICANT CHANGE UP (ref 42.2–75.2)
NITRITE UR-MCNC: NEGATIVE — SIGNIFICANT CHANGE UP
NRBC # BLD: 0 /100 WBCS — SIGNIFICANT CHANGE UP (ref 0–0)
PH UR: 6.5 — SIGNIFICANT CHANGE UP (ref 5–8)
PLATELET # BLD AUTO: 170 K/UL — SIGNIFICANT CHANGE UP (ref 130–400)
POTASSIUM SERPL-MCNC: 4.9 MMOL/L — SIGNIFICANT CHANGE UP (ref 3.5–5)
POTASSIUM SERPL-SCNC: 4.9 MMOL/L — SIGNIFICANT CHANGE UP (ref 3.5–5)
PROT SERPL-MCNC: 6.3 G/DL — SIGNIFICANT CHANGE UP (ref 6–8)
PROT UR-MCNC: ABNORMAL
RBC # BLD: 4.69 M/UL — LOW (ref 4.7–6.1)
RBC # FLD: 12.6 % — SIGNIFICANT CHANGE UP (ref 11.5–14.5)
RBC CASTS # UR COMP ASSIST: 1 /HPF — SIGNIFICANT CHANGE UP (ref 0–4)
SARS-COV-2 RNA SPEC QL NAA+PROBE: SIGNIFICANT CHANGE UP
SODIUM SERPL-SCNC: 140 MMOL/L — SIGNIFICANT CHANGE UP (ref 135–146)
SP GR SPEC: 1.02 — SIGNIFICANT CHANGE UP (ref 1.01–1.03)
TROPONIN T SERPL-MCNC: <0.01 NG/ML — SIGNIFICANT CHANGE UP
UROBILINOGEN FLD QL: SIGNIFICANT CHANGE UP
WBC # BLD: 8.86 K/UL — SIGNIFICANT CHANGE UP (ref 4.8–10.8)
WBC # FLD AUTO: 8.86 K/UL — SIGNIFICANT CHANGE UP (ref 4.8–10.8)
WBC UR QL: 2 /HPF — SIGNIFICANT CHANGE UP (ref 0–5)

## 2022-07-20 PROCEDURE — 99222 1ST HOSP IP/OBS MODERATE 55: CPT

## 2022-07-20 PROCEDURE — 70450 CT HEAD/BRAIN W/O DYE: CPT | Mod: 26,MA

## 2022-07-20 PROCEDURE — 93010 ELECTROCARDIOGRAM REPORT: CPT

## 2022-07-20 PROCEDURE — 99285 EMERGENCY DEPT VISIT HI MDM: CPT

## 2022-07-20 PROCEDURE — 71045 X-RAY EXAM CHEST 1 VIEW: CPT | Mod: 26

## 2022-07-20 RX ORDER — OXYBUTYNIN CHLORIDE 5 MG
10 TABLET ORAL DAILY
Refills: 0 | Status: DISCONTINUED | OUTPATIENT
Start: 2022-07-20 | End: 2022-07-21

## 2022-07-20 RX ORDER — OXCARBAZEPINE 300 MG/1
300 TABLET, FILM COATED ORAL
Refills: 0 | Status: DISCONTINUED | OUTPATIENT
Start: 2022-07-20 | End: 2022-07-27

## 2022-07-20 RX ORDER — ASPIRIN/CALCIUM CARB/MAGNESIUM 324 MG
1 TABLET ORAL
Qty: 0 | Refills: 0 | DISCHARGE

## 2022-07-20 RX ORDER — LEVETIRACETAM 250 MG/1
750 TABLET, FILM COATED ORAL
Refills: 0 | Status: DISCONTINUED | OUTPATIENT
Start: 2022-07-20 | End: 2022-07-23

## 2022-07-20 RX ORDER — METFORMIN HYDROCHLORIDE 850 MG/1
1 TABLET ORAL
Qty: 0 | Refills: 0 | DISCHARGE

## 2022-07-20 RX ORDER — OXCARBAZEPINE 300 MG/1
1 TABLET, FILM COATED ORAL
Qty: 0 | Refills: 0 | DISCHARGE

## 2022-07-20 RX ORDER — CITALOPRAM 10 MG/1
20 TABLET, FILM COATED ORAL DAILY
Refills: 0 | Status: DISCONTINUED | OUTPATIENT
Start: 2022-07-20 | End: 2022-07-27

## 2022-07-20 RX ORDER — ATORVASTATIN CALCIUM 80 MG/1
40 TABLET, FILM COATED ORAL AT BEDTIME
Refills: 0 | Status: DISCONTINUED | OUTPATIENT
Start: 2022-07-20 | End: 2022-07-27

## 2022-07-20 RX ORDER — ESCITALOPRAM OXALATE 10 MG/1
1 TABLET, FILM COATED ORAL
Qty: 0 | Refills: 0 | DISCHARGE

## 2022-07-20 RX ORDER — ASPIRIN/CALCIUM CARB/MAGNESIUM 324 MG
81 TABLET ORAL DAILY
Refills: 0 | Status: DISCONTINUED | OUTPATIENT
Start: 2022-07-20 | End: 2022-07-27

## 2022-07-20 RX ORDER — HEPARIN SODIUM 5000 [USP'U]/ML
5000 INJECTION INTRAVENOUS; SUBCUTANEOUS EVERY 8 HOURS
Refills: 0 | Status: DISCONTINUED | OUTPATIENT
Start: 2022-07-20 | End: 2022-07-27

## 2022-07-20 RX ORDER — SODIUM CHLORIDE 9 MG/ML
1000 INJECTION INTRAMUSCULAR; INTRAVENOUS; SUBCUTANEOUS ONCE
Refills: 0 | Status: COMPLETED | OUTPATIENT
Start: 2022-07-20 | End: 2022-07-20

## 2022-07-20 RX ORDER — CLOPIDOGREL BISULFATE 75 MG/1
75 TABLET, FILM COATED ORAL DAILY
Refills: 0 | Status: DISCONTINUED | OUTPATIENT
Start: 2022-07-21 | End: 2022-07-27

## 2022-07-20 RX ADMIN — SODIUM CHLORIDE 1000 MILLILITER(S): 9 INJECTION INTRAMUSCULAR; INTRAVENOUS; SUBCUTANEOUS at 11:09

## 2022-07-20 RX ADMIN — HEPARIN SODIUM 5000 UNIT(S): 5000 INJECTION INTRAVENOUS; SUBCUTANEOUS at 22:08

## 2022-07-20 RX ADMIN — Medication 81 MILLIGRAM(S): at 19:06

## 2022-07-20 RX ADMIN — Medication 10 MILLIGRAM(S): at 19:06

## 2022-07-20 RX ADMIN — ATORVASTATIN CALCIUM 40 MILLIGRAM(S): 80 TABLET, FILM COATED ORAL at 22:05

## 2022-07-20 RX ADMIN — OXCARBAZEPINE 300 MILLIGRAM(S): 300 TABLET, FILM COATED ORAL at 19:06

## 2022-07-20 RX ADMIN — LEVETIRACETAM 750 MILLIGRAM(S): 250 TABLET, FILM COATED ORAL at 19:06

## 2022-07-20 RX ADMIN — Medication 20 MILLIGRAM(S): at 22:06

## 2022-07-20 NOTE — H&P ADULT - ASSESSMENT
71M with PMH of stroke, partial seizures (follows with Dr. Bullock), ADAIR, DM, DLD, BPH, gait disorder (mainly mechanical), presented to the ED for AMS and dizziness that began last night around 23:00. Per patient and son at bedside, patient was sitting on the sofa last night watching television when he suddenly began to feel strange. Son mentions that patient started hallucinating seeing cartoon characters in the air. Patient subsequently attempted to stand up to go to the bathroom but immediately required assistance from his son as he felt dizzy and had a sensation of the room spinning. He went to bed but woke up several times throughout the night, still confused and still dizzy. The symptoms were still present this morning, prompting a visit to the ED. On ED arrival, CTH showed new focal hypodensities involving the left cerebellar hemisphere as well as the left periatrial white matter consistent with age indeterminate lacunar infarcts. Per son, patient's memory and cognition have been gradually declining over the last 2 months. He often forgets family members, forgets where he is, and at times talks nonsense. Son endorses that patient has been taking all of his medications, including AEDs (Keppra 750mg BID, Oxcarbazepine 300mg BID), as prescribed.     Stroke Workup  - MR brain noncon  - MR C-spine noncon  - Echo  - ASA 81mg qd  - Plavix 75mg qd  - C/w home Lipitor 40mg qd  - Lipid profile, HgbA1c, TSH  - Q4 neuro checks  - PT/OT/Rehab  - Speech and swallow eval    Seizure Disorder  - C/w home Keppra 750mg BID  - C/w home Oxcarbazepine 300mg BID  - Obtain REEG     DM  - ISS  - Fingersticks before meals and at bedtime    BPH  - C/w home Oxybutynin 15mg daily    Dyslipidemia  - Atorvastatin 40mg daily as above    Misc  - Diet: Carb Consistent  - Activity: IAT  - DVT Prophylaxis: Lovenox 40mg daily  - GI Prophylaxis: none  - Code Status: full code  - Disposition: acute

## 2022-07-20 NOTE — H&P ADULT - NSHPLABSRESULTS_GEN_ALL_CORE
14.8   8.86  )-----------( 170      ( 2022 10:40 )             42.7     Hemoglobin: 14.8 g/dL ( @ 10:40)    CBC Full  -  ( 2022 10:40 )  WBC Count : 8.86 K/uL  RBC Count : 4.69 M/uL  Hemoglobin : 14.8 g/dL  Hematocrit : 42.7 %  Platelet Count - Automated : 170 K/uL  Mean Cell Volume : 91.0 fL  Mean Cell Hemoglobin : 31.6 pg  Mean Cell Hemoglobin Concentration : 34.7 g/dL  Auto Neutrophil # : 6.16 K/uL  Auto Lymphocyte # : 1.65 K/uL  Auto Monocyte # : 0.93 K/uL  Auto Eosinophil # : 0.05 K/uL  Auto Basophil # : 0.04 K/uL  Auto Neutrophil % : 69.5 %  Auto Lymphocyte % : 18.6 %  Auto Monocyte % : 10.5 %  Auto Eosinophil % : 0.6 %  Auto Basophil % : 0.5 %        140  |  105  |  16  ----------------------------<  120<H>  4.9   |  22  |  1.0    Ca    8.7      2022 10:40  Mg     1.8         TPro  6.3  /  Alb  3.8  /  TBili  0.4  /  DBili  x   /  AST  17  /  ALT  14  /  AlkPhos  60      Creatinine Trend: 1.0<--  LIVER FUNCTIONS - ( 2022 10:40 )  Alb: 3.8 g/dL / Pro: 6.3 g/dL / ALK PHOS: 60 U/L / ALT: 14 U/L / AST: 17 U/L / GGT: x             CARDIAC MARKERS ( 2022 10:40 )  x     / <0.01 ng/mL / x     / x     / x                  Urinalysis Basic - ( 2022 11:20 )    Color: Yellow / Appearance: Clear / S.024 / pH: x  Gluc: x / Ketone: Negative  / Bili: Negative / Urobili: <2 mg/dL   Blood: x / Protein: 30 mg/dL / Nitrite: Negative   Leuk Esterase: Negative / RBC: 1 /HPF / WBC 2 /HPF   Sq Epi: x / Non Sq Epi: 1 /HPF / Bacteria: Negative

## 2022-07-20 NOTE — ED PROVIDER NOTE - NS ED ATTENDING STATEMENT MOD
This was a shared visit with the GIOVANY. I reviewed and verified the documentation and independently performed the documented:

## 2022-07-20 NOTE — ED PROVIDER NOTE - NS ED ROS FT
Constitutional: + gen weakness/fatigue. no fever, chills  Eyes: no redness/discharge/pain/vision changes  ENT: no rhinorrhea/ear pain/sore throat  Cardiac: No chest pain, SOB or edema.  Respiratory: No cough or respiratory distress  GI: No nausea, vomiting, diarrhea or abdominal pain.  : No dysuria, frequency, urgency or hematuria  MS: no pain to back or extremities, no loss of ROM, no weakness  Neuro: + unsteady gat. No headache or weakness. No LOC.  Skin: No skin rash.  Endocrine: + hx of diabetes.  Except as documented in the HPI, all other systems are negative.

## 2022-07-20 NOTE — H&P ADULT - NSHPPHYSICALEXAM_GEN_ALL_CORE
Vital Signs Last 24 Hrs  T(C): 36.5 (20 Jul 2022 16:34), Max: 36.9 (20 Jul 2022 10:35)  T(F): 97.7 (20 Jul 2022 16:34), Max: 98.4 (20 Jul 2022 10:35)  HR: 67 (20 Jul 2022 16:34) (67 - 90)  BP: 162/82 (20 Jul 2022 16:34) (146/80 - 162/82)  BP(mean): --  RR: 18 (20 Jul 2022 16:34) (17 - 18)  SpO2: 96% (20 Jul 2022 16:34) (96% - 98%)    Parameters below as of 20 Jul 2022 16:34  Patient On (Oxygen Delivery Method): room air        I&O's Summary    GENERAL: No acute distress, well-developed  HEAD:  Atraumatic, Normocephalic  ENT: PERRL, conjunctiva and sclera clear  CHEST/LUNG: Clear to auscultation bilaterally  HEART: Regular rate and rhythm; No murmurs, rubs, or gallops  ABDOMEN: Soft, nontender, nondistended  EXTREMITIES:  No clubbing, cyanosis, or edema  PSYCH: Nl behavior, nl affect    Neurological Examination:  General:  Appearance is consistent with chronologic age.  Mask facies.  Gross skin survey within normal limits.    Cognitive/Language:  Awake, alert, and oriented to person, place, time and date.  Recent and remote memory intact.  Fund of knowledge is appropriate.  Naming, repetition and comprehension intact.   Nondysarthric.    Cranial Nerves  - Eyes: Visual acuity intact, Visual fields full.  EOMI w/o nystagmus, skew or reported double vision.  PERRL.  B/l ptosis (difficulty keeping eyes open)  - Face:  Facial sensation normal V1 - 3, no facial asymmetry.    - Ears/Nose/Throat:  Hearing grossly intact b/l to finger rub.  Palate elevates midline.  Tongue and uvula midline.   Motor examination:  Upper Extremities: L 5/5, R 5/5; Lower extremities: L 4+/5, R 5/5.  No observable drift. Normal tone and bulk. Increased tone in all extremities (L > R)  Sensory examination:   Intact to light touch and pinprick, pain, temperature and proprioception and vibration in all extremities.  Reflexes:   2+ b/l biceps, triceps, brachioradialis, 3+ patella and 2+ achilles.    Cerebellum:   FTN impaired with mild dysmetria in b/l UEs (L > R).  Severely ataxic gait, positive romberg, unable to maintain balance on his own while standing

## 2022-07-20 NOTE — H&P ADULT - HISTORY OF PRESENT ILLNESS
Patient is a 71M with PMH of CVA, partial seizures (follows with Dr. Bullock), ADAIR, DM, DLD, BPH, gait disorder (mainly mechanical), presented to the ED for AMS and dizziness that began last night around 23:00. Per patient and son at bedside, patient was sitting on the sofa last night watching television when he suddenly began to feel strange. Son mentions that patient started hallucinating seeing cartoon characters in the air. Patient subsequently attempted to stand up to go to the bathroom but immediately required assistance from his son as he felt dizzy and had a sensation of the room spinning. He went to bed but woke up several times throughout the night, still confused and still dizzy. The symptoms were still present this morning, prompting a visit to the ED. On ED arrival, CTH showed new focal hypodensities involving the left cerebellar hemisphere as well as the left periatrial white matter consistent with age indeterminate lacunar infarcts. Per son, patient's memory and cognition have been gradually declining over the last 2 months. He often forgets family members, forgets where he is, and at times talks nonsense. Son endorses that patient has been taking all of his medications, including AEDs (Keppra 750mg BID, Oxcarbazepine 300mg BID), as prescribed.

## 2022-07-20 NOTE — ED ADULT NURSE NOTE - NSIMPLEMENTINTERV_GEN_ALL_ED
Implemented All Fall Risk Interventions:  [place] to call system. Call bell, personal items and telephone within reach. Instruct patient to call for assistance. Room bathroom lighting operational. Non-slip footwear when patient is off stretcher. Physically safe environment: no spills, clutter or unnecessary equipment. Stretcher in lowest position, wheels locked, appropriate side rails in place. Provide visual cue, wrist band, yellow gown, etc. Monitor gait and stability. Monitor for mental status changes and reorient to person, place, and time. Review medications for side effects contributing to fall risk. Reinforce activity limits and safety measures with patient and family.

## 2022-07-20 NOTE — H&P ADULT - NSHPREVIEWOFSYSTEMS_GEN_ALL_CORE
CONSTITUTIONAL: No weakness, fevers or chills  EYES/ENT: No visual changes;  No dysphagia  NECK: No pain or stiffness  RESPIRATORY: No cough, wheezing, hemoptysis; No shortness of breath  CARDIOVASCULAR: No chest pain or palpitations; No lower extremity edema  GASTROINTESTINAL: No abdominal or epigastric pain. No nausea, vomiting, or hematemesis; No diarrhea or constipation. No melena or hematochezia.  GENITOURINARY: No dysuria, frequency or hematuria  NEUROLOGICAL: As stated in HPI  HEMATOLOGY: No easy bleeding, no lymphadenopathy  SKIN: No itching, burning, rashes, or lesions   All other review of systems is negative unless indicated above.

## 2022-07-20 NOTE — ED ADULT TRIAGE NOTE - CCCP TRG CHIEF CMPLNT
Yes, would recommend labs, most likely is due to anticoagulation, but would want to make sure that platelet levels okay, and will double check INR through venous draw.   weakness

## 2022-07-20 NOTE — ED PROVIDER NOTE - CLINICAL SUMMARY MEDICAL DECISION MAKING FREE TEXT BOX
72-year-old male PMH CVA, DM, anxiety, HTN, neuropathy, epilepsy presenting for evaluation of generalized weakness for the past week, associated with worsening from baseline unsteadiness on his legs and difficulties ambulating.  The patient lives with his son, according to his son he has been drinking and eating less.  Patient denies any recent illness, fever /chills, shortness of breath or chest pain, no cough, no abdominal pain, no vomiting, no blood in the stools.  States he is not aware that he is drinking last, but reports darker urine recently.  Typically ambulates with a cane, a few days ago fell, due to unsteadiness.  Denies any drugs or alcohol use. Elderly male resting in stretcher in no acute distress, well nourished, but overall appears tired, head AT/NC, pink conjunctiva, dry oral  mucosa, no midline spine TTP, supple neck, no acute auscultation bilaterally, equal air entry, speaking full sentences, RRR, well-perfused extremities, abdomen soft/NT/ND, no denies spine or CVA TTP, no unilateral leg swelling or pitting leg edema, patient is awake and alert x3, follows directions appropriately, no slurring of speech, negative heel-to-shin, strength equal bilaterally.  Plan: Labs, hydration, CT head, likely admission for further work-up.

## 2022-07-20 NOTE — ED ADULT TRIAGE NOTE - CHIEF COMPLAINT QUOTE
Pt here c/o" weakness and feeling tired". EMS reports pt recently loss son. Pt states hx of epilepsy and has been compliant with meds. Denies n/v/fevers

## 2022-07-20 NOTE — ED PROVIDER NOTE - PROGRESS NOTE DETAILS
EP: Patient reports feeling somewhat better with IV fluid hydration, CT head shows new several hypodensities since the last CT dated fall of last year.  Findings discussed with the patient and his son, will admit for further work-up and management

## 2022-07-20 NOTE — H&P ADULT - ATTENDING COMMENTS
Patient seen and examined and agree with above except as noted.  Patients history, notes, labs, imaging, vitals and meds reviewed personally.    Patients out patient records from Neurologist Dr. Bullock reviewed  Patient had episode of visual hallucinations last night and a number of falls recently  Says he is more wobbly when walking and has a hard time ambulating even with walker now    Plan as above

## 2022-07-20 NOTE — ED PROVIDER NOTE - PHYSICAL EXAMINATION
CONSTITUTIONAL: Well-appearing; well-nourished; in no apparent distress.   EYES: PERRL; EOM intact.   ENT: normal nose; no rhinorrhea; normal pharynx with no tonsillar hypertrophy.   NECK: Supple; non-tender; no cervical lymphadenopathy.   CARDIOVASCULAR: Normal S1, S2; no murmurs, rubs, or gallops.   RESPIRATORY: Normal chest excursion with respiration; breath sounds clear and equal bilaterally; no wheezes, rhonchi, or rales.  GI/: Normal bowel sounds; non-distended; non-tender; no palpable organomegaly.   MS: No evidence of trauma or deformity.  Normal ROM in all four extremities; non-tender to palpation; distal pulses are normal.   SKIN: Normal for age and race; warm; dry; good turgor; no apparent lesions or exudate.   NEURO/PSYCH: A & O x 4; grossly unremarkable. No facial droop. No tongue deviation. strength equal b/l 5/5 throughout. sensation intact

## 2022-07-20 NOTE — ED PROVIDER NOTE - OBJECTIVE STATEMENT
72 year old M with hx of CVA, partial seizures, kendra, dm, dld, bph, gait d/o c/o gen weakness x 1 week. Sts has had difficulty ambulating and unsteady gait for the last few days. As per son pt has had gradually worsening confusion/memory loss x few months. Pt son recently passed away and has noted decreased po intake. Pt denies any assoc fever/chills, ha, dizziness, visual changes, cp, sob, abd pain, n/v/d, urinary symptoms, numbness, back pain, neck pain/stiffness.

## 2022-07-21 ENCOUNTER — APPOINTMENT (OUTPATIENT)
Age: 72
End: 2022-07-21

## 2022-07-21 LAB
A1C WITH ESTIMATED AVERAGE GLUCOSE RESULT: 6.6 % — HIGH (ref 4–5.6)
ANION GAP SERPL CALC-SCNC: 12 MMOL/L — SIGNIFICANT CHANGE UP (ref 7–14)
BUN SERPL-MCNC: 17 MG/DL — SIGNIFICANT CHANGE UP (ref 10–20)
CALCIUM SERPL-MCNC: 8.8 MG/DL — SIGNIFICANT CHANGE UP (ref 8.5–10.1)
CHLORIDE SERPL-SCNC: 101 MMOL/L — SIGNIFICANT CHANGE UP (ref 98–110)
CHOLEST SERPL-MCNC: 104 MG/DL — SIGNIFICANT CHANGE UP
CO2 SERPL-SCNC: 25 MMOL/L — SIGNIFICANT CHANGE UP (ref 17–32)
CREAT SERPL-MCNC: 0.8 MG/DL — SIGNIFICANT CHANGE UP (ref 0.7–1.5)
CULTURE RESULTS: SIGNIFICANT CHANGE UP
EGFR: 95 ML/MIN/1.73M2 — SIGNIFICANT CHANGE UP
ESTIMATED AVERAGE GLUCOSE: 143 MG/DL — HIGH (ref 68–114)
GLUCOSE BLDC GLUCOMTR-MCNC: 121 MG/DL — HIGH (ref 70–99)
GLUCOSE BLDC GLUCOMTR-MCNC: 157 MG/DL — HIGH (ref 70–99)
GLUCOSE BLDC GLUCOMTR-MCNC: 97 MG/DL — SIGNIFICANT CHANGE UP (ref 70–99)
GLUCOSE BLDC GLUCOMTR-MCNC: 98 MG/DL — SIGNIFICANT CHANGE UP (ref 70–99)
GLUCOSE SERPL-MCNC: 95 MG/DL — SIGNIFICANT CHANGE UP (ref 70–99)
HDLC SERPL-MCNC: 32 MG/DL — LOW
LIPID PNL WITH DIRECT LDL SERPL: 52 MG/DL — SIGNIFICANT CHANGE UP
NON HDL CHOLESTEROL: 72 MG/DL — SIGNIFICANT CHANGE UP
POTASSIUM SERPL-MCNC: 4 MMOL/L — SIGNIFICANT CHANGE UP (ref 3.5–5)
POTASSIUM SERPL-SCNC: 4 MMOL/L — SIGNIFICANT CHANGE UP (ref 3.5–5)
SODIUM SERPL-SCNC: 138 MMOL/L — SIGNIFICANT CHANGE UP (ref 135–146)
SPECIMEN SOURCE: SIGNIFICANT CHANGE UP
TRIGL SERPL-MCNC: 99 MG/DL — SIGNIFICANT CHANGE UP

## 2022-07-21 PROCEDURE — 99232 SBSQ HOSP IP/OBS MODERATE 35: CPT

## 2022-07-21 PROCEDURE — 99223 1ST HOSP IP/OBS HIGH 75: CPT

## 2022-07-21 RX ORDER — TAMSULOSIN HYDROCHLORIDE 0.4 MG/1
0.4 CAPSULE ORAL ONCE
Refills: 0 | Status: COMPLETED | OUTPATIENT
Start: 2022-07-21 | End: 2022-07-21

## 2022-07-21 RX ORDER — TAMSULOSIN HYDROCHLORIDE 0.4 MG/1
0.4 CAPSULE ORAL AT BEDTIME
Refills: 0 | Status: DISCONTINUED | OUTPATIENT
Start: 2022-07-21 | End: 2022-07-21

## 2022-07-21 RX ADMIN — Medication 20 MILLIGRAM(S): at 06:59

## 2022-07-21 RX ADMIN — CLOPIDOGREL BISULFATE 75 MILLIGRAM(S): 75 TABLET, FILM COATED ORAL at 11:31

## 2022-07-21 RX ADMIN — Medication 81 MILLIGRAM(S): at 11:31

## 2022-07-21 RX ADMIN — Medication 10 MILLIGRAM(S): at 06:59

## 2022-07-21 RX ADMIN — OXCARBAZEPINE 300 MILLIGRAM(S): 300 TABLET, FILM COATED ORAL at 17:07

## 2022-07-21 RX ADMIN — HEPARIN SODIUM 5000 UNIT(S): 5000 INJECTION INTRAVENOUS; SUBCUTANEOUS at 07:03

## 2022-07-21 RX ADMIN — ATORVASTATIN CALCIUM 40 MILLIGRAM(S): 80 TABLET, FILM COATED ORAL at 21:57

## 2022-07-21 RX ADMIN — HEPARIN SODIUM 5000 UNIT(S): 5000 INJECTION INTRAVENOUS; SUBCUTANEOUS at 13:17

## 2022-07-21 RX ADMIN — Medication 10 MILLIGRAM(S): at 11:31

## 2022-07-21 RX ADMIN — OXCARBAZEPINE 300 MILLIGRAM(S): 300 TABLET, FILM COATED ORAL at 06:59

## 2022-07-21 RX ADMIN — CITALOPRAM 20 MILLIGRAM(S): 10 TABLET, FILM COATED ORAL at 11:31

## 2022-07-21 RX ADMIN — Medication 20 MILLIGRAM(S): at 17:08

## 2022-07-21 RX ADMIN — Medication 10 MILLIGRAM(S): at 17:08

## 2022-07-21 RX ADMIN — TAMSULOSIN HYDROCHLORIDE 0.4 MILLIGRAM(S): 0.4 CAPSULE ORAL at 18:39

## 2022-07-21 RX ADMIN — LEVETIRACETAM 750 MILLIGRAM(S): 250 TABLET, FILM COATED ORAL at 06:57

## 2022-07-21 RX ADMIN — HEPARIN SODIUM 5000 UNIT(S): 5000 INJECTION INTRAVENOUS; SUBCUTANEOUS at 21:58

## 2022-07-21 RX ADMIN — LEVETIRACETAM 750 MILLIGRAM(S): 250 TABLET, FILM COATED ORAL at 17:07

## 2022-07-21 NOTE — PHYSICAL THERAPY INITIAL EVALUATION ADULT - ADDITIONAL COMMENTS
PTA: pt lives in [place] c 5 [place] c HR. Has 1 flight to 2nd level to [place] c HR. Was IND c dressing, toileting, bathing. Family assists c cooking, cleaning, shopping. Pt does not drive. Uses SC to amb outdoors. Has RW, but does not need it.

## 2022-07-21 NOTE — OCCUPATIONAL THERAPY INITIAL EVALUATION ADULT - GENERAL OBSERVATIONS, REHAB EVAL
Pt encountered in bedside chair eating breakfast with 1:1 sit. + tele, IV locked. Cooperative to OT tx and seen from 8:45-9:14.

## 2022-07-21 NOTE — PATIENT PROFILE ADULT - FALL HARM RISK - HARM RISK INTERVENTIONS

## 2022-07-21 NOTE — SWALLOW BEDSIDE ASSESSMENT ADULT - NS SPL SWALLOW CLINIC TRIAL FT
jose-pharyngeal swallow is WFL for thin, puree and regular solids w/o overt s/s of penetration/aspiration

## 2022-07-21 NOTE — OCCUPATIONAL THERAPY INITIAL EVALUATION ADULT - ADL RETRAINING, OT EVAL
Patient will perform lower body dressing with Ind with use of appropriate adaptive equipment as needed by discharge.

## 2022-07-21 NOTE — PHYSICAL THERAPY INITIAL EVALUATION ADULT - PERTINENT HX OF CURRENT PROBLEM, REHAB EVAL
Pt presents to [facility] 2/2 dizziness and hallucinations while at home c son. Pt got up and was unsteady. Came in to ER for neuro work up. New findings associated c L cerebellar involvement on CT scan. Awaiting MRI no-con.

## 2022-07-21 NOTE — SWALLOW BEDSIDE ASSESSMENT ADULT - SLP PERTINENT HISTORY OF CURRENT PROBLEM
Pt presented to the ED for AMS and dizziness CTH showed new focal hypodensities involving the left cerebellar hemisphere as well as the left periatrial white matter consistent with age indeterminate lacunar infarcts PMH of CVA, partial seizures (follows with Dr. Bullock), ADAIR, DM, DLD, BPH, gait disorder (mainly mechanical),infarcts. Per son, patient's memory and cognition have been gradually declining over the last 2 months. He often forgets family members, forgets where he is, and at times talks nonsense.

## 2022-07-21 NOTE — PHYSICAL THERAPY INITIAL EVALUATION ADULT - IMPAIRMENTS FOUND, PT EVAL
aerobic capacity/endurance/arousal, attention, and cognition/cognitive impairment/ergonomics and body mechanics/gait, locomotion, and balance/gross motor/muscle strength/poor safety awareness

## 2022-07-21 NOTE — OCCUPATIONAL THERAPY INITIAL EVALUATION ADULT - PERTINENT HX OF CURRENT PROBLEM, REHAB EVAL
71M with PMH of stroke, partial seizures (follows with Dr. Bullock), ADAIR, DM, DLD, BPH, gait disorder (mainly mechanical), presented to the ED for AMS and dizziness that began last night around 23:00. Per patient and son at bedside, patient was sitting on the sofa last night watching television when he suddenly began to feel strange.

## 2022-07-21 NOTE — PROGRESS NOTE ADULT - ASSESSMENT
71M with PMH of stroke, partial seizures (follows with Dr. Bullock), ADAIR, DM, DLD, BPH, gait disorder (mainly mechanical), presented to the ED for AMS and dizziness that began last night around 23:00. Per patient and son at bedside, patient was sitting on the sofa last night watching television when he suddenly began to feel strange.  On ED arrival, CTH showed new focal hypodensities involving the left cerebellar hemisphere as well as the left periatrial white matter consistent with age indeterminate lacunar infarcts. Per son, patient's memory and cognition have been gradually declining over the last 2 months.       PLAN  Stroke Workup  - CTH: new focal hypodensities involving the left cerebellar hemisphere as well as the left periatrial white matter consistent with age indeterminate lacunar infarcts.  - f/u MR brain noncon, MR angio, MR C-spine noncon  - f/u Echo  - ASA 81mg qd  - Plavix 75mg qd  - C/w home Lipitor 40mg qd  - LDL:  52 ,  A1c: 6.6   - Q4 neuro checks      Seizure Disorder  - C/w home Keppra 750mg BID  - C/w home Oxcarbazepine 300mg BID  - F/U REEG     HTN  - CW home enalapril 20mg daily   - monitor vitals     Depression  - cw buspirone 10mg and citalopram 20mg daily      DM  - ISS  - Fingersticks before meals and at bedtime    BPH  - C/w home Oxybutynin 15mg daily    Dyslipidemia  - Atorvastatin 40mg daily as above    Misc  - Diet: Carb Consistent  - Activity: IAT  - DVT Prophylaxis: Lovenox 40mg daily  - GI Prophylaxis: none  - Code Status: full code  - Disposition: acute

## 2022-07-21 NOTE — OCCUPATIONAL THERAPY INITIAL EVALUATION ADULT - ADDITIONAL COMMENTS
Pt lives with 2 sons in [place] 5 steps to enter. Pt spends the day alone and uses a SC for outdoor mobility (approx. 1 block). Sons want PT to use RW 2* H/O falls and Pt declines.

## 2022-07-21 NOTE — PROGRESS NOTE ADULT - SUBJECTIVE AND OBJECTIVE BOX
Neurology Progress Note    Interval History:    No events overnight    Medications:  aspirin  chewable 81 milliGRAM(s) Enteral Tube daily  atorvastatin 40 milliGRAM(s) Oral at bedtime  busPIRone 10 milliGRAM(s) Oral two times a day  citalopram 20 milliGRAM(s) Oral daily  clopidogrel Tablet 75 milliGRAM(s) Oral daily  enalapril 20 milliGRAM(s) Oral two times a day  heparin   Injectable 5000 Unit(s) SubCutaneous every 8 hours  levETIRAcetam 750 milliGRAM(s) Oral two times a day  OXcarbazepine 300 milliGRAM(s) Oral two times a day  oxybutynin 10 milliGRAM(s) Oral daily      Vital Signs Last 24 Hrs  T(C): 35.7 (2022 06:30), Max: 36.5 (2022 16:34)  T(F): 96.2 (2022 06:30), Max: 97.7 (2022 16:34)  HR: 54 (2022 06:30) (54 - 78)  BP: 158/77 (2022 06:30) (153/76 - 184/84)  BP(mean): 110 (2022 06:30) (108 - 110)  RR: 18 (2022 06:30) (18 - 18)  SpO2: 100% (2022 20:54) (96% - 100%)    Parameters below as of 2022 06:30  Patient On (Oxygen Delivery Method): room air        Neurological Examination:  General:  Appearance is consistent with chronologic age.  Mask facies.  Gross skin survey within normal limits.    Cognitive/Language:  Awake, alert, and oriented to person, place, time and date.  Recent and remote memory intact.  Fund of knowledge is appropriate.  Naming, repetition and comprehension intact.   Nondysarthric.    Cranial Nerves  - Eyes: Visual acuity intact, Visual fields full.  EOMI w/o nystagmus, skew or reported double vision.  PERRL.  No ptosis  - Face:  Facial sensation normal V1 - 3, no facial asymmetry.    - Ears/Nose/Throat:  Hearing grossly intact  Motor examination:  Upper Extremities: L 5/5, R 5/5; Lower extremities: L 4+/5, R 5/5.  No observable drift. Normal tone and bulk. Increased tone in all extremities (L > R)  Sensory examination:   Intact to light touch throughout  Cerebellum:   FTN impaired with mild dysmetria in b/l UEs (L > R).  Gait deferred    Labs:  CBC Full  -  ( 2022 10:40 )  WBC Count : 8.86 K/uL  RBC Count : 4.69 M/uL  Hemoglobin : 14.8 g/dL  Hematocrit : 42.7 %  Platelet Count - Automated : 170 K/uL  Mean Cell Volume : 91.0 fL  Mean Cell Hemoglobin : 31.6 pg  Mean Cell Hemoglobin Concentration : 34.7 g/dL  Auto Neutrophil # : 6.16 K/uL  Auto Lymphocyte # : 1.65 K/uL  Auto Monocyte # : 0.93 K/uL  Auto Eosinophil # : 0.05 K/uL  Auto Basophil # : 0.04 K/uL  Auto Neutrophil % : 69.5 %  Auto Lymphocyte % : 18.6 %  Auto Monocyte % : 10.5 %  Auto Eosinophil % : 0.6 %  Auto Basophil % : 0.5 %    07-    138  |  101  |  17  ----------------------------<  95  4.0   |  25  |  0.8    Ca    8.8      2022 08:42  Mg     1.8     07-    TPro  6.3  /  Alb  3.8  /  TBili  0.4  /  DBili  x   /  AST  17  /  ALT  14  /  AlkPhos  60  07-20    LIVER FUNCTIONS - ( 2022 10:40 )  Alb: 3.8 g/dL / Pro: 6.3 g/dL / ALK PHOS: 60 U/L / ALT: 14 U/L / AST: 17 U/L / GGT: x             Urinalysis Basic - ( 2022 11:20 )    Color: Yellow / Appearance: Clear / S.024 / pH: x  Gluc: x / Ketone: Negative  / Bili: Negative / Urobili: <2 mg/dL   Blood: x / Protein: 30 mg/dL / Nitrite: Negative   Leuk Esterase: Negative / RBC: 1 /HPF / WBC 2 /HPF   Sq Epi: x / Non Sq Epi: 1 /HPF / Bacteria: Negative       Neurology Progress Note    Interval History:    No events overnight.    Medications:  aspirin  chewable 81 milliGRAM(s) Enteral Tube daily  atorvastatin 40 milliGRAM(s) Oral at bedtime  busPIRone 10 milliGRAM(s) Oral two times a day  citalopram 20 milliGRAM(s) Oral daily  clopidogrel Tablet 75 milliGRAM(s) Oral daily  enalapril 20 milliGRAM(s) Oral two times a day  heparin   Injectable 5000 Unit(s) SubCutaneous every 8 hours  levETIRAcetam 750 milliGRAM(s) Oral two times a day  OXcarbazepine 300 milliGRAM(s) Oral two times a day  oxybutynin 10 milliGRAM(s) Oral daily      Vital Signs Last 24 Hrs  T(C): 35.7 (2022 06:30), Max: 36.5 (2022 16:34)  T(F): 96.2 (2022 06:30), Max: 97.7 (2022 16:34)  HR: 54 (2022 06:30) (54 - 78)  BP: 158/77 (2022 06:30) (153/76 - 184/84)  BP(mean): 110 (2022 06:30) (108 - 110)  RR: 18 (2022 06:30) (18 - 18)  SpO2: 100% (2022 20:54) (96% - 100%)    Parameters below as of 2022 06:30  Patient On (Oxygen Delivery Method): room air        Neurological Examination:  General:  Appearance is consistent with chronologic age.  Mask facies.  Gross skin survey within normal limits.    Cognitive/Language:  Awake, alert, and oriented to person, place, time and date.  Recent and remote memory intact.  Fund of knowledge is appropriate.  Naming, repetition and comprehension intact.   Nondysarthric.    Cranial Nerves  - Eyes: Visual acuity intact, Visual fields full.  EOMI w/o nystagmus, skew or reported double vision.  PERRL.  No ptosis  - Face:  Facial sensation normal V1 - 3, no facial asymmetry.    - Ears/Nose/Throat:  Hearing grossly intact  Motor examination:  Upper Extremities: L 5/5, R 5/5; Lower extremities: L 4+/5, R 5/5.  No observable drift. Normal tone and bulk. Increased tone in all extremities (L > R)  Sensory examination:   Intact to light touch throughout  Cerebellum:   FTN impaired with mild dysmetria in b/l UEs (L > R).  Gait deferred    Labs:  CBC Full  -  ( 2022 10:40 )  WBC Count : 8.86 K/uL  RBC Count : 4.69 M/uL  Hemoglobin : 14.8 g/dL  Hematocrit : 42.7 %  Platelet Count - Automated : 170 K/uL  Mean Cell Volume : 91.0 fL  Mean Cell Hemoglobin : 31.6 pg  Mean Cell Hemoglobin Concentration : 34.7 g/dL  Auto Neutrophil # : 6.16 K/uL  Auto Lymphocyte # : 1.65 K/uL  Auto Monocyte # : 0.93 K/uL  Auto Eosinophil # : 0.05 K/uL  Auto Basophil # : 0.04 K/uL  Auto Neutrophil % : 69.5 %  Auto Lymphocyte % : 18.6 %  Auto Monocyte % : 10.5 %  Auto Eosinophil % : 0.6 %  Auto Basophil % : 0.5 %    -    138  |  101  |  17  ----------------------------<  95  4.0   |  25  |  0.8    Ca    8.8      2022 08:42  Mg     1.8     -    TPro  6.3  /  Alb  3.8  /  TBili  0.4  /  DBili  x   /  AST  17  /  ALT  14  /  AlkPhos  60  07-20    LIVER FUNCTIONS - ( 2022 10:40 )  Alb: 3.8 g/dL / Pro: 6.3 g/dL / ALK PHOS: 60 U/L / ALT: 14 U/L / AST: 17 U/L / GGT: x             Urinalysis Basic - ( 2022 11:20 )    Color: Yellow / Appearance: Clear / S.024 / pH: x  Gluc: x / Ketone: Negative  / Bili: Negative / Urobili: <2 mg/dL   Blood: x / Protein: 30 mg/dL / Nitrite: Negative   Leuk Esterase: Negative / RBC: 1 /HPF / WBC 2 /HPF   Sq Epi: x / Non Sq Epi: 1 /HPF / Bacteria: Negative

## 2022-07-21 NOTE — PATIENT PROFILE ADULT - NSFALLSECTIONLABEL_GEN_A_CORE
Patient Education     Coronavirus Disease 2019 (COVID-19): Caring for Yourself or Others  If you or a household member have symptoms of COVID-19, follow these guidelines for preventing spread of the virus, and managing symptoms.  If you have COVID-19 symptoms  · Stay home. Call your healthcare provider and tell them you have symptoms of COVID-19. Do this before going to any hospital or clinic. Follow your provider's instructions. You may be advised to isolate yourself at home. This is called self-isolation. You may also be told to stay at least 6 feet from others to prevent the spread of COVID-19. This is called \"social distancing.\"  · Stay away from work, school, and public places. Limit physical contact with family members. Limit visitors. Don't kiss anyone or share eating or drinking utensils. Clean surfaces you touch with disinfectant. This is to help prevent the virus from spreading.  · If you need to cough or sneeze, do it into a tissue. Then throw the tissue into the trash. If you don't have tissues, cough or sneeze into the bend of your elbow.  · Wear a cloth face mask with two or more layers of washable, breathable fabric while in public or when indoors with people who don't live with you. Or you can wear a disposable paper mask with a cloth mask on top. You can make a cloth face mask of your own. The CDC has instructions on how to make a face mask. Wear the mask so that it covers both your nose and mouth.  · Don’t share food or personal items with people in your household. This includes items like eating and drinking utensils, towels, and bedding.  · If you need to go to a hospital or clinic, expect that the healthcare staff will wear protective equipment such as masks, gowns, gloves, and eye protection. You may be advised to wait in or enter through a separate area. This is to prevent the possible virus from spreading.  · Tell the healthcare staff about recent travel. This includes local travel on public  transport. Staff may need to find other people you have been in contact with.  · Follow all instructions the healthcare staff give you.    If you have been diagnosed with COVID-19  · Stay home and start self-isolation. Don’t leave your home unless you need to get medical care. Don't go to work, school, or public areas. Don't use public transportation or taxis.  · Follow all instructions from your healthcare provider. Call your healthcare provider’s office before going. They can prepare and give you instructions. This will help prevent the virus from spreading.  · If you need to go to a hospital or clinic, expect that the healthcare staff will wear protective equipment such as masks, gowns, gloves, and eye protection. You may be advised to wait in or enter through a separate area. This is to prevent the possible virus from spreading.  · Wear a face mask with 2 or more layers. Use either a cloth mask with layers of tightly woven, breathable fabric or a disposable paper mask with a cloth mask on top. This is to protect other people from your germs. If you are not able to wear a mask, your caregivers should. You can make a cloth face mask of your own. The CDC has instructions on how to make a face mask. Wear the mask so that it covers both your nose and mouth.  · Stay away from other people in your home.  · Avoid contact with pets and animals.  · Don’t share food or personal items with people in your household. This includes items like eating and drinking utensils, towels, and bedding.  · If you need to cough or sneeze, do it into a tissue. Then throw the tissue into the trash. If you don't have tissues, cough or sneeze into the bend of your elbow.  · Wash your hands often.    Self-care at home   The FDA has approved vaccines to prevent COVID-19 in people older than 18 (one vaccine has been approved for people as young as 16). Talk with your healthcare provider about your risks and which vaccine is best for  you.  Pregnant or breastfeeding people may choose to be vaccinated. Expert groups, including ACOG and the CDC, advise pregnant or breastfeeding people to talk with their healthcare provider about being vaccinated.  The vaccines are being rolled out to the public in phases. Check your local health department about your community's roll-out plans. The vaccines are given as a shot (injection) in the arm muscle. A 1-dose or 2-dose vaccine may be given. If you get the 2-dose vaccine, the second dose is given several weeks after the first.  Current treatment is mainly aimed at helping your body while it fights the virus. This is known as supportive care. For serious COVID-19, you may need to stay in the hospital. Supportive care includes:  · Getting rest. This helps your body fight the illness.  · Staying hydrated.  Drinking liquids is the best way to prevent dehydration. Try to drink 6 to 8 glasses of liquids every day, or as advised by your provider. Also check with your provider about which fluids are best for you. Don't drink fluids that contain caffeine or alcohol.  · Taking over-the-counter (OTC) pain medicine. These are used to help ease pain and reduce fever. Follow your healthcare provider's instructions for which OTC medicine to use.  If you've been treated for suspected or confirmed COVID-19 , follow all of your healthcare team's instructions. This will include when it's OK to stop self-isolation. You may also get instructions on position changes to help your breathing, such as lying on your belly (prone positioning). If you were treated at a hospital and discharged, you may be sent home with a pulse oximeter. This is a small electronic device that you clip on your fingertip. It measures the amount of oxygen in your body. Follow your healthcare team's instructions on its use, how they will be in touch with you, and when to call them.  The FDA recently approved monoclonal antibody therapy for emergency use in  certain people who have a positive COVID-19 viral test and have mild to moderate symptoms but are not in the hospital. It's not widely available and is still being investigated. It's approved for people 12 years and older who weigh about 88 pounds (40 kgs) and are at high risk for severe COVID-19 and a hospital stay. This includes people who are 65 years and older and people with certain chronic conditions. Monoclonal antibody therapy is not approved for people who:  · Are in the hospital with COVID-19, or  · Need oxygen therapy for COVID-19, or  · Need oxygen therapy for a chronic condition and need to have oxygen flow increased because of COVID-19     If you've had confirmed COVID-19, your healthcare team may ask you to consider donating your plasma. This is called COVID-19 convalescent plasma donation. Plasma from people fully recovered from COVID-19 may contain antibodies to help fight COVID-19 in people who are currently seriously ill with the disease. Experts don't know the safety of COVID-19 convalescent plasma or how well it works. Research continues. The FDA has approved it for emergency use in certain people with serious or life-threatening COVID-19.  Home care for a sick person   · Follow all instructions from healthcare staff.  · Wash your hands often.  · Wear protective clothing as advised.  · Make sure the sick person wears a mask. If they can't wear a mask, don't stay in the same room with the person. If you must be in the same room, wear a face mask. When wearing a mask, make sure that it covers both the nose and mouth.  · Keep track of the sick person’s symptoms.  · Clean home surfaces often with disinfectant. This includes phones, kitchen counters, fridge door handle, bathroom surfaces, and others.  · Don’t let anyone share household items with the sick person. This includes eating and drinking tools, towels, sheets, or blankets.  · Clean fabrics and laundry thoroughly.  · Keep other people and  pets away from the sick person.    When you can stop self-isolation  When you are sick with COVID-19, you should stay away from other people. This is called self-isolation.  Your limits are different if you've had COVID-19 in the last 3 months but are fully recovered without symptoms and you have been exposed to someone with COVID-19. If you are symptom-free, you don't need to stay home away from others or be retested. The CDC doesn't recommend retesting unless you have symptoms of COVID-19 and your new symptoms can't be linked to another illness. Contact your healthcare provider if you have any questions. If you develop symptoms, stay home. If you had COVID-19 over 3 months ago and have been exposed again, treat it like you've never had COVID-19 and stay home, limit your contact with others, call your provider, and monitor for symptoms.  If you are normally healthy, the CDC does not advise retesting for COVID-19 with nose-throat swabs. You can stop self-isolation when all 3 of these are true:  1. You have had no fever for at least 24 hours. This means no fever without medicine that reduces fever, such as acetaminophen, for at least 24 hours.  2. Your symptoms such as cough or trouble breathing have improved.  3. It has been at least 10 days since your first symptoms started.  Talk with your healthcare provider before you leave home. Tell them if the 3 things above are true for you. They may tell you it’s OK to leave home. In some cases, your state or local area may have specific advice. Your healthcare provider will tell you more.   If you have a weak immune system and COVID-19, or if you've had severe COVID-19,  your instructions on when to stop isolation will be somewhat different. Some conditions and treatments can cause a weak immune system. These include cancer treatment, bone marrow or organ transplants, and conditions such as HIV or other immune system disorders. You may be advised to stay home from 10 days  to 20 days after your symptoms first started. Your healthcare provider may want to retest you for COVID-19. Follow your provider's instructions.  Mask guidance  Consider the CDC's guidance and your local community's instructions on face masks.     · Cloth masks may help prevent people who have COVID-19 from spreading the virus to others.  · Cloth masks are most likely to reduce COVID-19 spread when masks are widely used by people who are out in the public.  · Wear a mask inside your house if you live with someone who has symptoms of COVID-19 or has tested positive for COVID-19.  · Generally, the CDC advises people ages 2 and older who are not vaccinated to wear masks in public places and when around people who don't live in their household.  · CDC's guidance for when to wear a mask is a bit different for fully vaccinated people. Fully vaccinated means 2 weeks after getting either the 1-dose or the second shot of the 2-dose vaccine. They:  ? Don't need to wear a mask outdoors except in crowded settings. For example, at an outdoor concert or sporting event.  ? Can visit with other fully vaccinated people indoors without wearing masks or social distancing.  ? Can visit indoors without a mask or social distancing with unvaccinated people from a single household who are at low risk for severe COVID-19. This includes children.  ? Should continue to wear masks in indoor public settings.        Certain people should not wear a face covering. This includes:  · Children younger than 2 years old  · Anyone with a health, developmental, or mental health condition that can be made worse by wearing a mask  · Anyone who is unconscious or unable to remove the face covering without help. See the CDC's guidance on who should not wear a face mask.     When to call your healthcare provider  Call your healthcare provider right away if a sick person has any of these:  · Trouble breathing  · Pain or pressure in chest  If a sick person has  any of these, call 911:  · Trouble breathing that gets worse  · Pain or pressure in chest that gets worse  · Blue tint to lips or face  · Fast or irregular heartbeat  · Confusion or trouble waking  · Fainting or loss of consciousness  · Coughing up blood  Going home from the hospital  If you were diagnosed with COVID-19 and were recently discharged from the hospital:  · Follow the instructions above for self-care and isolation.  · Follow the hospital healthcare team’s specific instructions.  · Ask questions if anything is unclear to you. Write down answers so you remember them.  Date last modified: 4/30/2021  [employer] last reviewed this educational content on 4/1/2020    © 1260-3239 [employer], [address] [place] [address], [place] [address]. All rights reserved. This information is not intended as a substitute for professional medical care. Always follow your healthcare professional's instructions. This information has been modified by your health care provider with permission from the publisher.              .

## 2022-07-21 NOTE — PROGRESS NOTE ADULT - SUBJECTIVE AND OBJECTIVE BOX
Neurology Stroke Progress Note    INTERVAL HPI/OVERNIGHT EVENTS:  Patient seen and examined.  number ______ used.    MEDICATIONS  (STANDING):  aspirin  chewable 81 milliGRAM(s) Enteral Tube daily  atorvastatin 40 milliGRAM(s) Oral at bedtime  busPIRone 10 milliGRAM(s) Oral two times a day  citalopram 20 milliGRAM(s) Oral daily  clopidogrel Tablet 75 milliGRAM(s) Oral daily  enalapril 20 milliGRAM(s) Oral two times a day  heparin   Injectable 5000 Unit(s) SubCutaneous every 8 hours  levETIRAcetam 750 milliGRAM(s) Oral two times a day  OXcarbazepine 300 milliGRAM(s) Oral two times a day  oxybutynin 10 milliGRAM(s) Oral daily    MEDICATIONS  (PRN):    Allergies    No Known Allergies    Intolerances      Vital Signs Last 24 Hrs  T(C): 35.7 (2022 06:30), Max: 36.5 (2022 16:34)  T(F): 96.2 (2022 06:30), Max: 97.7 (2022 16:34)  HR: 54 (2022 06:30) (54 - 78)  BP: 158/77 (2022 06:30) (153/76 - 184/84)  BP(mean): 110 (2022 06:30) (108 - 110)  RR: 18 (2022 06:30) (18 - 18)  SpO2: 100% (2022 20:54) (96% - 100%)    Parameters below as of 2022 06:30  Patient On (Oxygen Delivery Method): room air        Physical exam:  General: No acute distress, awake and alert  Eyes: Anicteric sclerae, moist conjunctivae, see below for CNs  Neck: trachea midline, FROM, supple, no thyromegaly or lymphadenopathy  Cardiovascular: Regular rate and rhythm, no murmurs, rubs, or gallops. No carotid bruits.   Pulmonary: Anterior breath sounds clear bilaterally, no crackles or wheezing. No use of accessory muscles  GI: Abdomen soft, non-distended, non-tender  Extremities: Radial and DP pulses +2, no edema    Neurologic:  -Mental status: Awake, alert, oriented to person, place, and time. Speech is fluent with intact naming, repetition, and comprehension, no dysarthria. Recent and remote memory intact. Follows commands. Attention/concentration intact. Fund of knowledge appropriate.  -Cranial nerves:   II: Visual fields are full to confrontation.  III, IV, VI: Extraocular movements are intact without nystagmus. Pupils equally round and reactive to light  V:  Facial sensation V1-V3 equal and intact   VII: Face is symmetric with normal eye closure and smile  VIII: Hearing is bilaterally intact to finger rub  IX, X: Uvula is midline and soft palate rises symmetrically  XI: Head turning and shoulder shrug are intact.  XII: Tongue protrudes midline  Motor: Normal bulk and tone. No pronator drift. Strength bilateral upper extremity 5/5, bilateral lower extremities 5/5.  Rapid alternating movements intact and symmetric  Sensation: Intact to light touch bilaterally. No neglect or extinction on double simultaneous testing.  Coordination: No dysmetria on finger-to-nose and heel-to-shin bilaterally  Reflexes: Downgoing toes bilaterally   Gait: Narrow gait and steady    LABS:                        14.8   8.86  )-----------( 170      ( 2022 10:40 )             42.7     07-21    138  |  101  |  17  ----------------------------<  95  4.0   |  25  |  0.8    Ca    8.8      2022 08:42  Mg     1.8     07-20    TPro  6.3  /  Alb  3.8  /  TBili  0.4  /  DBili  x   /  AST  17  /  ALT  14  /  AlkPhos  60  07-20      Urinalysis Basic - ( 2022 11:20 )    Color: Yellow / Appearance: Clear / S.024 / pH: x  Gluc: x / Ketone: Negative  / Bili: Negative / Urobili: <2 mg/dL   Blood: x / Protein: 30 mg/dL / Nitrite: Negative   Leuk Esterase: Negative / RBC: 1 /HPF / WBC 2 /HPF   Sq Epi: x / Non Sq Epi: 1 /HPF / Bacteria: Negative        RADIOLOGY & ADDITIONAL TESTS:     Neurology Stroke Progress Note    INTERVAL HPI/OVERNIGHT EVENTS:  Patient seen and examined. He was sitting comfortably and did not appear in any distress. He stated that he felt a lot better than when he first came to the hospital.     MEDICATIONS  (STANDING):  aspirin  chewable 81 milliGRAM(s) Enteral Tube daily  atorvastatin 40 milliGRAM(s) Oral at bedtime  busPIRone 10 milliGRAM(s) Oral two times a day  citalopram 20 milliGRAM(s) Oral daily  clopidogrel Tablet 75 milliGRAM(s) Oral daily  enalapril 20 milliGRAM(s) Oral two times a day  heparin   Injectable 5000 Unit(s) SubCutaneous every 8 hours  levETIRAcetam 750 milliGRAM(s) Oral two times a day  OXcarbazepine 300 milliGRAM(s) Oral two times a day  oxybutynin 10 milliGRAM(s) Oral daily    MEDICATIONS  (PRN):    Allergies    No Known Allergies    Intolerances      Vital Signs Last 24 Hrs  T(C): 35.7 (2022 06:30), Max: 36.5 (2022 16:34)  T(F): 96.2 (2022 06:30), Max: 97.7 (2022 16:34)  HR: 54 (2022 06:30) (54 - 78)  BP: 158/77 (2022 06:30) (153/76 - 184/84)  BP(mean): 110 (2022 06:30) (108 - 110)  RR: 18 (2022 06:30) (18 - 18)  SpO2: 100% (2022 20:54) (96% - 100%)    Parameters below as of 2022 06:30  Patient On (Oxygen Delivery Method): room air        Physical exam:  GENERAL: No acute distress, well-developed  HEAD:  Atraumatic, Normocephalic  ENT: PERRL, conjunctiva and sclera clear  CHEST/LUNG: Clear to auscultation bilaterally  HEART: Regular rate and rhythm; No murmurs, rubs, or gallops  ABDOMEN: Soft, nontender, nondistended  EXTREMITIES:  No clubbing, cyanosis, or edema  PSYCH: Nl behavior, nl affect    Neurological Examination:  General:  Appearance is consistent with chronologic age.  Mask facies.  Gross skin survey within normal limits.    Cognitive/Language:  Awake, alert, and oriented to person, place, time and date.  Recent and remote memory intact.  Fund of knowledge is appropriate.  Naming, repetition and comprehension intact.   Nondysarthric.    Cranial Nerves  - Eyes: Visual acuity intact, Visual fields full.  EOMI w/o nystagmus, skew or reported double vision.  PERRL.  B/l ptosis (difficulty keeping eyes open)  - Face:  Facial sensation normal V1 - 3, no facial asymmetry.    - Ears/Nose/Throat:  Hearing grossly intact b/l to finger rub.  Palate elevates midline.  Tongue and uvula midline.   Motor examination:  Upper Extremities: L 5/5, R 5/5; Lower extremities: L 4+/5, R 5/5.  No observable drift. Normal tone and bulk. mildly inc tone in all extremities (less than yesterday)  Sensory examination:   Intact to light touch and pinprick, pain, temperature and proprioception and vibration in all extremities.  Reflexes:   2+ b/l biceps, triceps, brachioradialis, 3+ patella and 2+ achilles.    Cerebellum:  Severely ataxic gait, positive Romberg unable to maintain balance on his own while standing    LABS:                        14.8   8.86  )-----------( 170      ( 2022 10:40 )             42.7     07-21    138  |  101  |  17  ----------------------------<  95  4.0   |  25  |  0.8    Ca    8.8      2022 08:42  Mg     1.8     07-20    TPro  6.3  /  Alb  3.8  /  TBili  0.4  /  DBili  x   /  AST  17  /  ALT  14  /  AlkPhos  60  07-20      Urinalysis Basic - ( 2022 11:20 )    Color: Yellow / Appearance: Clear / S.024 / pH: x  Gluc: x / Ketone: Negative  / Bili: Negative / Urobili: <2 mg/dL   Blood: x / Protein: 30 mg/dL / Nitrite: Negative   Leuk Esterase: Negative / RBC: 1 /HPF / WBC 2 /HPF   Sq Epi: x / Non Sq Epi: 1 /HPF / Bacteria: Negative        RADIOLOGY & ADDITIONAL TESTS:

## 2022-07-21 NOTE — PROGRESS NOTE ADULT - ASSESSMENT
71M with PMH of stroke, partial seizures (follows with Dr. Bullock), ADAIR, DM, DLD, BPH, gait disorder (mainly mechanical), presented to the ED for AMS and dizziness that began last night around 23:00. Per patient and son at bedside, patient was sitting on the sofa last night watching television when he suddenly began to feel strange. Son mentions that patient started hallucinating seeing cartoon characters in the air. Patient subsequently attempted to stand up to go to the bathroom but immediately required assistance from his son as he felt dizzy and had a sensation of the room spinning. He went to bed but woke up several times throughout the night, still confused and still dizzy. The symptoms were still present this morning, prompting a visit to the ED. On ED arrival, CTH showed new focal hypodensities involving the left cerebellar hemisphere as well as the left periatrial white matter consistent with age indeterminate lacunar infarcts. Per son, patient's memory and cognition have been gradually declining over the last 2 months. He often forgets family members, forgets where he is, and at times talks nonsense. Son endorses that patient has been taking all of his medications, including AEDs (Keppra 750mg BID, Oxcarbazepine 300mg BID), as prescribed.     Stroke Workup  - MR brain noncon  - MR C-spine noncon  - Echo  - ASA 81mg qd  - Plavix 75mg qd  - C/w home Lipitor 40mg qd  - Lipid profile, HgbA1c, TSH  - Q4 neuro checks  - PT/OT/Rehab  - Speech and swallow eval    Seizure Disorder  - C/w home Keppra 750mg BID  - C/w home Oxcarbazepine 300mg BID  - Obtain REEG     DM  - ISS  - Fingersticks before meals and at bedtime    BPH  - C/w home Oxybutynin 15mg daily    Dyslipidemia  - Atorvastatin 40mg daily as above    Misc  - Diet: Carb Consistent  - Activity: IAT  - DVT Prophylaxis: Lovenox 40mg daily  - GI Prophylaxis: none  - Code Status: full code  - Disposition: acute   71M with PMH of stroke, partial seizures (follows with Dr. Bullock), ADAIR, DM, DLD, BPH, gait disorder (mainly mechanical), presented to the ED for AMS and dizziness that began last night around 23:00. Per patient and son at bedside, patient was sitting on the sofa last night watching television when he suddenly began to feel strange. Son mentions that patient started hallucinating seeing cartoon characters in the air. Patient subsequently attempted to stand up to go to the bathroom but immediately required assistance from his son as he felt dizzy and had a sensation of the room spinning. He went to bed but woke up several times throughout the night, still confused and still dizzy. The symptoms were still present this morning, prompting a visit to the ED. On ED arrival, CTH showed new focal hypodensities involving the left cerebellar hemisphere as well as the left periatrial white matter consistent with age indeterminate lacunar infarcts. Per son, patient's memory and cognition have been gradually declining over the last 2 months. He often forgets family members, forgets where he is, and at times talks nonsense. Son endorses that patient has been taking all of his medications, including AEDs (Keppra 750mg BID, Oxcarbazepine 300mg BID), as prescribed.     Stroke Workup  - MR brain noncon  - MR C-spine noncon  - Echo  - ASA 81mg qd  - Plavix 75mg qd  - C/w home Lipitor 40mg qd  - Lipid profile, HgbA1c, TSH  - Q4 neuro checks  - PT/OT/Rehab  - Speech and swallow eval    Seizure Disorder  - C/w home Keppra 750mg BID  - C/w home Oxcarbazepine 300mg BID  - REEG  (7/20): showed generalized slowing, no epileptiform acitivty    DM  - ISS  - Fingersticks before meals and at bedtime    BPH  - C/w home Oxybutynin 15mg daily    Dyslipidemia  - Atorvastatin 40mg daily as above    Misc  - Diet: Carb Consistent  - Activity: IAT  - DVT Prophylaxis: Lovenox 40mg daily  - GI Prophylaxis: none  - Code Status: full code  - Disposition: acute

## 2022-07-22 ENCOUNTER — TRANSCRIPTION ENCOUNTER (OUTPATIENT)
Age: 72
End: 2022-07-22

## 2022-07-22 LAB
ANION GAP SERPL CALC-SCNC: 11 MMOL/L — SIGNIFICANT CHANGE UP (ref 7–14)
BUN SERPL-MCNC: 20 MG/DL — SIGNIFICANT CHANGE UP (ref 10–20)
CALCIUM SERPL-MCNC: 8.9 MG/DL — SIGNIFICANT CHANGE UP (ref 8.5–10.1)
CHLORIDE SERPL-SCNC: 103 MMOL/L — SIGNIFICANT CHANGE UP (ref 98–110)
CO2 SERPL-SCNC: 25 MMOL/L — SIGNIFICANT CHANGE UP (ref 17–32)
CREAT SERPL-MCNC: 0.9 MG/DL — SIGNIFICANT CHANGE UP (ref 0.7–1.5)
EGFR: 91 ML/MIN/1.73M2 — SIGNIFICANT CHANGE UP
GLUCOSE BLDC GLUCOMTR-MCNC: 120 MG/DL — HIGH (ref 70–99)
GLUCOSE BLDC GLUCOMTR-MCNC: 153 MG/DL — HIGH (ref 70–99)
GLUCOSE BLDC GLUCOMTR-MCNC: 178 MG/DL — HIGH (ref 70–99)
GLUCOSE SERPL-MCNC: 126 MG/DL — HIGH (ref 70–99)
HCT VFR BLD CALC: 43.3 % — SIGNIFICANT CHANGE UP (ref 42–52)
HGB BLD-MCNC: 14.7 G/DL — SIGNIFICANT CHANGE UP (ref 14–18)
MAGNESIUM SERPL-MCNC: 1.9 MG/DL — SIGNIFICANT CHANGE UP (ref 1.8–2.4)
MCHC RBC-ENTMCNC: 30.9 PG — SIGNIFICANT CHANGE UP (ref 27–31)
MCHC RBC-ENTMCNC: 33.9 G/DL — SIGNIFICANT CHANGE UP (ref 32–37)
MCV RBC AUTO: 91.2 FL — SIGNIFICANT CHANGE UP (ref 80–94)
NRBC # BLD: 0 /100 WBCS — SIGNIFICANT CHANGE UP (ref 0–0)
PHOSPHATE SERPL-MCNC: 3.8 MG/DL — SIGNIFICANT CHANGE UP (ref 2.1–4.9)
PLATELET # BLD AUTO: 162 K/UL — SIGNIFICANT CHANGE UP (ref 130–400)
POTASSIUM SERPL-MCNC: 3.8 MMOL/L — SIGNIFICANT CHANGE UP (ref 3.5–5)
POTASSIUM SERPL-SCNC: 3.8 MMOL/L — SIGNIFICANT CHANGE UP (ref 3.5–5)
RBC # BLD: 4.75 M/UL — SIGNIFICANT CHANGE UP (ref 4.7–6.1)
RBC # FLD: 12.6 % — SIGNIFICANT CHANGE UP (ref 11.5–14.5)
SODIUM SERPL-SCNC: 139 MMOL/L — SIGNIFICANT CHANGE UP (ref 135–146)
WBC # BLD: 6.41 K/UL — SIGNIFICANT CHANGE UP (ref 4.8–10.8)
WBC # FLD AUTO: 6.41 K/UL — SIGNIFICANT CHANGE UP (ref 4.8–10.8)

## 2022-07-22 PROCEDURE — 72141 MRI NECK SPINE W/O DYE: CPT | Mod: 26

## 2022-07-22 PROCEDURE — 99232 SBSQ HOSP IP/OBS MODERATE 35: CPT

## 2022-07-22 PROCEDURE — 99233 SBSQ HOSP IP/OBS HIGH 50: CPT

## 2022-07-22 PROCEDURE — 70544 MR ANGIOGRAPHY HEAD W/O DYE: CPT | Mod: 26,59

## 2022-07-22 PROCEDURE — 70551 MRI BRAIN STEM W/O DYE: CPT | Mod: 26

## 2022-07-22 PROCEDURE — 70547 MR ANGIOGRAPHY NECK W/O DYE: CPT | Mod: 26

## 2022-07-22 RX ADMIN — Medication 10 MILLIGRAM(S): at 21:40

## 2022-07-22 RX ADMIN — LEVETIRACETAM 750 MILLIGRAM(S): 250 TABLET, FILM COATED ORAL at 05:50

## 2022-07-22 RX ADMIN — Medication 20 MILLIGRAM(S): at 21:40

## 2022-07-22 RX ADMIN — CITALOPRAM 20 MILLIGRAM(S): 10 TABLET, FILM COATED ORAL at 12:17

## 2022-07-22 RX ADMIN — LEVETIRACETAM 750 MILLIGRAM(S): 250 TABLET, FILM COATED ORAL at 21:41

## 2022-07-22 RX ADMIN — HEPARIN SODIUM 5000 UNIT(S): 5000 INJECTION INTRAVENOUS; SUBCUTANEOUS at 13:18

## 2022-07-22 RX ADMIN — ATORVASTATIN CALCIUM 40 MILLIGRAM(S): 80 TABLET, FILM COATED ORAL at 21:40

## 2022-07-22 RX ADMIN — HEPARIN SODIUM 5000 UNIT(S): 5000 INJECTION INTRAVENOUS; SUBCUTANEOUS at 21:41

## 2022-07-22 RX ADMIN — Medication 20 MILLIGRAM(S): at 05:49

## 2022-07-22 RX ADMIN — CLOPIDOGREL BISULFATE 75 MILLIGRAM(S): 75 TABLET, FILM COATED ORAL at 12:17

## 2022-07-22 RX ADMIN — OXCARBAZEPINE 300 MILLIGRAM(S): 300 TABLET, FILM COATED ORAL at 05:49

## 2022-07-22 RX ADMIN — OXCARBAZEPINE 300 MILLIGRAM(S): 300 TABLET, FILM COATED ORAL at 21:41

## 2022-07-22 RX ADMIN — Medication 10 MILLIGRAM(S): at 05:50

## 2022-07-22 RX ADMIN — HEPARIN SODIUM 5000 UNIT(S): 5000 INJECTION INTRAVENOUS; SUBCUTANEOUS at 05:52

## 2022-07-22 RX ADMIN — Medication 81 MILLIGRAM(S): at 12:17

## 2022-07-22 NOTE — PROGRESS NOTE ADULT - SUBJECTIVE AND OBJECTIVE BOX
ANN BLANCO  72y  Male    Patient is a 72y old  Male who presents with a chief complaint of Stroke workup (21 Jul 2022 14:13)      HPI:  Patient is a 71M with PMH of CVA, partial seizures (follows with Dr. Bullock), ADAIR, DM, DLD, BPH, gait disorder (mainly mechanical), presented to the ED for AMS and dizziness that began last night around 23:00. Per patient and son at bedside, patient was sitting on the sofa last night watching television when he suddenly began to feel strange. Son mentions that patient started hallucinating seeing cartoon characters in the air. Patient subsequently attempted to stand up to go to the bathroom but immediately required assistance from his son as he felt dizzy and had a sensation of the room spinning. He went to bed but woke up several times throughout the night, still confused and still dizzy. The symptoms were still present this morning, prompting a visit to the ED. On ED arrival, CTH showed new focal hypodensities involving the left cerebellar hemisphere as well as the left periatrial white matter consistent with age indeterminate lacunar infarcts. Per son, patient's memory and cognition have been gradually declining over the last 2 months. He often forgets family members, forgets where he is, and at times talks nonsense. Son endorses that patient has been taking all of his medications, including AEDs (Keppra 750mg BID, Oxcarbazepine 300mg BID), as prescribed.  (20 Jul 2022 16:35)    S: Patient was examined and seen at bedside. This morning pt is resting comfortably in bed and reports no new issues or overnight events. No complaints, feels better.   Denies CP, SOB, N/V/D/C/AP, cough, F, chills, dizziness, new focal weakness, HA, vision changes, dysuria, or urinary symptoms, blood in stool.  ROS: all other systems reviewed and are negative    PAST MEDICAL & SURGICAL HISTORY:  Nonintractable epilepsy without status epilepticus, unspecified epilepsy type      Neuropathy      Hypertension, unspecified type      Type 2 diabetes mellitus with complication, without long-term current use of insulin      Anxiety      Cerebrovascular accident (CVA), unspecified mechanism      History of surgery  BROKEN LEG SURGERY      Capsular cataract of right eye        SOCIAL HISTORY:  Tobacco: denies  Illicit drugs: denies  Alcohol: social  Family history reviewed and otherwise non-contributory  ALLERGIES: NKDA    General: NAD. Looks stated age.  HEENT: clean oropharynx, EOMI, no LAD  Neck: trachea midline, no thyromegaly  CV: nl S1 S2; no m/r/g  Resp: decreased breath sounds at base  GI: NT/ND/S +BS  MS: no clubbing/cyanosis/edema, +pulses  Neuro: motor, sensory intact; + muscle rigidity all extremities  Skin: no rashes, nl turgor  Psychiatric: AA0x3 w/ good insight and judgement    tele: SR, nonspecific changes (on my own evaluation of tele monitor)            MEDICATIONS  (STANDING):  aspirin  chewable 81 milliGRAM(s) Enteral Tube daily  atorvastatin 40 milliGRAM(s) Oral at bedtime  busPIRone 10 milliGRAM(s) Oral two times a day  citalopram 20 milliGRAM(s) Oral daily  clopidogrel Tablet 75 milliGRAM(s) Oral daily  enalapril 20 milliGRAM(s) Oral two times a day  heparin   Injectable 5000 Unit(s) SubCutaneous every 8 hours  levETIRAcetam 750 milliGRAM(s) Oral two times a day  OXcarbazepine 300 milliGRAM(s) Oral two times a day    MEDICATIONS  (PRN):    Home Medications:  busPIRone 15 mg oral tablet: 1 tab(s) orally once a day (20 Jul 2022 16:56)  citalopram 20 mg oral tablet: 1 tab(s) orally once a day (20 Jul 2022 16:56)  enalapril 20 mg oral tablet: 1 tab(s) orally 2 times a day (20 Jul 2022 16:56)  Janumet  mg-1000 mg oral tablet, extended release: 1 tab(s) orally once a day (in the evening) (20 Jul 2022 16:56)  Keppra 750 mg oral tablet: 1 tab(s) orally 2 times a day (20 Jul 2022 16:56)  Lipitor 40 mg oral tablet: 1 tab(s) orally once a day (20 Jul 2022 16:56)  metoprolol succinate 50 mg oral tablet, extended release: 1 tab(s) orally once a day (20 Jul 2022 16:56)  OXcarbazepine 300 mg oral tablet: 1 tab(s) orally 2 times a day (20 Jul 2022 16:56)  oxybutynin 15 mg/24 hr oral tablet, extended release: 1 tab(s) orally once a day (20 Jul 2022 16:56)  Plavix 75 mg oral tablet: 1 tab(s) orally once a day (20 Jul 2022 16:56)    Vital Signs Last 24 Hrs  T(C): 35.8 (22 Jul 2022 13:50), Max: 36.3 (21 Jul 2022 20:00)  T(F): 96.4 (22 Jul 2022 13:50), Max: 97.3 (21 Jul 2022 20:00)  HR: 78 (22 Jul 2022 13:50) (64 - 78)  BP: 142/75 (22 Jul 2022 13:50) (142/75 - 179/80)  BP(mean): 101 (22 Jul 2022 13:50) (101 - 101)  RR: 18 (22 Jul 2022 13:50) (18 - 18)  SpO2: --      CAPILLARY BLOOD GLUCOSE      POCT Blood Glucose.: 178 mg/dL (22 Jul 2022 11:52)  POCT Blood Glucose.: 120 mg/dL (22 Jul 2022 08:05)  POCT Blood Glucose.: 98 mg/dL (21 Jul 2022 21:15)    LABS:                        14.7   6.41  )-----------( 162      ( 22 Jul 2022 06:24 )             43.3     07-22    139  |  103  |  20  ----------------------------<  126<H>  3.8   |  25  |  0.9    Ca    8.9      22 Jul 2022 06:24  Phos  3.8     07-22  Mg     1.9     07-22                        Culture - Urine (collected 20 Jul 2022 11:20)  Source: Clean Catch Clean Catch (Midstream)  Final Report (21 Jul 2022 22:54):    <10,000 CFU/mL Normal Urogenital Katelyn      Consultant Notes Reviewed:  [x ] YES  [ ] NO  Care Discussed with Consultants/Other Providers/ Housestaff [ x] YES  [ ] NO  Radiology, labs, new studies personally reviewed.

## 2022-07-22 NOTE — PROGRESS NOTE ADULT - ASSESSMENT
71M with PMH of stroke, partial seizures (follows with Dr. Bullock), ADAIR, DM, DLD, BPH, gait disorder (mainly mechanical), presented to the ED for AMS and dizziness that began last night around 23:00. Per patient and son at bedside, patient was sitting on the sofa last night watching television when he suddenly began to feel strange.  On ED arrival, CTH showed new focal hypodensities involving the left cerebellar hemisphere as well as the left periatrial white matter consistent with age indeterminate lacunar infarcts. Per son, patient's memory and cognition have been gradually declining over the last 2 months.       PLAN  Stroke Workup  - CTH: new focal hypodensity involving the left cerebellar hemisphere as well as the left periatrial white matter consistent with age indeterminate lacunar infarcts.  - f/u MR brain non con, MR angio, MR C-spine non con  - f/u Echo  - ASA 81mg qd  - Plavix 75mg qd  - C/w home Lipitor 40mg qd  - LDL:  52 ,  A1c: 6.6   - Q4 neuro checks      Seizure Disorder  - C/w home Keppra 750mg BID  - C/w home Oxcarbazepine 300mg BID  - F/U REEG     HTN  - CW home enalapril 20mg daily   - monitor vitals     Depression  - cw buspirone 10mg and citalopram 20mg daily      DM  - ISS  - Fingersticks before meals and at bedtime    BPH  - C/w home Oxybutynin 15mg daily    Dyslipidemia  - Atorvastatin 40mg daily as above    Misc  - Diet: Carb Consistent  - Activity: IAT  - DVT Prophylaxis: Lovenox 40mg daily  - GI Prophylaxis: none  - Code Status: full code  - Disposition: acute

## 2022-07-22 NOTE — DISCHARGE NOTE NURSING/CASE MANAGEMENT/SOCIAL WORK - NSDCPEFALRISK_GEN_ALL_CORE
For information on Fall & Injury Prevention, visit: [email or web address] OR  [email or web address] OR  https://www.cdc.gov/steadi/patient.html
16-Mar-2019 16:11

## 2022-07-22 NOTE — DISCHARGE NOTE NURSING/CASE MANAGEMENT/SOCIAL WORK - PATIENT PORTAL LINK FT
You can access the FollowMyHealth Patient Portal offered by [facility] by registering at the following website: [email or web address]. By joining [facility]’s FollowMyHealth portal, you will also be able to view your health information using other applications (apps) compatible with our system.

## 2022-07-22 NOTE — PROGRESS NOTE ADULT - SUBJECTIVE AND OBJECTIVE BOX
Neurology Stroke Progress Note    INTERVAL HPI/OVERNIGHT EVENTS:  Patient seen and examined. He was sitting comfortably in the chair and did not appear in acute distress. He mentioned that he felt a lot better than yesterday     MEDICATIONS  (STANDING):  aspirin  chewable 81 milliGRAM(s) Enteral Tube daily  atorvastatin 40 milliGRAM(s) Oral at bedtime  busPIRone 10 milliGRAM(s) Oral two times a day  citalopram 20 milliGRAM(s) Oral daily  clopidogrel Tablet 75 milliGRAM(s) Oral daily  enalapril 20 milliGRAM(s) Oral two times a day  heparin   Injectable 5000 Unit(s) SubCutaneous every 8 hours  levETIRAcetam 750 milliGRAM(s) Oral two times a day  OXcarbazepine 300 milliGRAM(s) Oral two times a day    MEDICATIONS  (PRN):    Allergies    No Known Allergies    Intolerances      Vital Signs Last 24 Hrs  T(C): 35.8 (22 Jul 2022 13:50), Max: 36.3 (21 Jul 2022 20:00)  T(F): 96.4 (22 Jul 2022 13:50), Max: 97.3 (21 Jul 2022 20:00)  HR: 78 (22 Jul 2022 13:50) (64 - 78)  BP: 142/75 (22 Jul 2022 13:50) (105/53 - 179/80)  BP(mean): 101 (22 Jul 2022 13:50) (76 - 101)  RR: 18 (22 Jul 2022 13:50) (18 - 18)  SpO2: --        Physical exam:  GENERAL: No acute distress, well-developed  HEAD:  Atraumatic, Normocephalic  ENT: PERRL, conjunctiva and sclera clear  CHEST/LUNG: Clear to auscultation bilaterally  HEART: Regular rate and rhythm; No murmurs, rubs, or gallops  ABDOMEN: Soft, nontender, nondistended  EXTREMITIES:  No clubbing, cyanosis, or edema  PSYCH: Nl behavior, nl affect    Neurological Examination:  General:  Appearance is consistent with chronologic age.  Mask facies.  Gross skin survey within normal limits.    Cognitive/Language:  Awake, alert, and oriented to person, place, time and date.  Recent and remote memory intact.  Fund of knowledge is appropriate.  Naming, repetition and comprehension intact.   Nondysarthric.    Cranial Nerves  - Eyes: Visual acuity intact, Visual fields full.  EOMI w/o nystagmus, skew or reported double vision.  PERRL.   - Face:  Facial sensation normal V1 - 3, no facial asymmetry.    - Ears/Nose/Throat:  Hearing grossly intact b/l to finger rub.  Palate elevates midline.  Tongue and uvula midline.   Motor examination:  Upper Extremities: L 5/5, R 5/5; Lower extremities: L 4+/5, R 5/5.  No observable drift. Normal tone and bulk. normal tone in all extremities (less than yesterday)  Sensory examination:   Intact to light touch and pinprick, pain, temperature and proprioception and vibration in all extremities.  Reflexes:   2+ b/l biceps, triceps, brachioradialis, 3+ patella and 2+ achilles.    Cerebellum: negative for ataxia     LABS:                        14.7   6.41  )-----------( 162      ( 22 Jul 2022 06:24 )             43.3     07-22    139  |  103  |  20  ----------------------------<  126<H>  3.8   |  25  |  0.9    Ca    8.9      22 Jul 2022 06:24  Phos  3.8     07-22  Mg     1.9     07-22            RADIOLOGY & ADDITIONAL TESTS:

## 2022-07-22 NOTE — PROGRESS NOTE ADULT - ASSESSMENT
71M with PMH of stroke, partial seizures (follows with Dr. Bullock), ADAIR, DM, DLD, BPH, gait disorder (mainly mechanical), presented to the ED for AMS and dizziness that began last night around 23:00. Per patient and son at bedside, patient was sitting on the sofa last night watching television when he suddenly began to feel strange.  On ED arrival, CTH showed new focal hypodensities involving the left cerebellar hemisphere as well as the left periatrial white matter consistent with age indeterminate lacunar infarcts. Per son, patient's memory and cognition have been gradually declining over the last 2 months.     #AMS/Dizziness/Abnormal CTH R/o CVA  - CTH: new focal hypodensities involving the left cerebellar hemisphere as well as the left periatrial white matter consistent with age indeterminate lacunar infarcts.  - f/u MR brain noncon, MR angio, MR C-spine noncon  - f/u Echo  - ASA 81mg qd  - Plavix 75mg qd  - C/w home Lipitor 40mg qd  - LDL:  52 ,  A1c: 6.6   - Q4 neuro checks  -check TSH, B12, RPR, ammonia    Seizure Disorder  - C/w home Keppra 750mg BID  - C/w home Oxcarbazepine 300mg BID  -  REEG : diffuse slowing    HTN  - CW home enalapril 20mg daily   - monitor vitals     Depression  - cw buspirone 10mg and citalopram 20mg daily      DM  - ISS  - Fingersticks before meals and at bedtime    BPH  - C/w home Oxybutynin 15mg daily    Dyslipidemia  - Atorvastatin 40mg daily as above    Misc  - Diet: Carb Consistent  - Activity: IAT  - DVT Prophylaxis: Lovenox 40mg daily, SCDs  - GI Prophylaxis: none  - Code Status: full code  - Disposition: acute    #Progress Note Handoff  Pending: Consults____Clinical improvement and stability__x___Tests__MRIs, TTE______PT____x____  Pt/Family discussion: Pt/son informed and agree with the current plan  Disposition: Home__?____/SNF_______/4A______/To be determined____x____    My note supersedes the residents note should a discrepancy arise.    Chart and notes personally reviewed.  Care Discussed with Consultants/Other Providers/ Housestaff [ x] YES [ ] NO   Radiology, labs, old records personally reviewed.    discussed w/ housestaff, nursing, case management, neuro team, son

## 2022-07-23 LAB
ANION GAP SERPL CALC-SCNC: 12 MMOL/L — SIGNIFICANT CHANGE UP (ref 7–14)
BUN SERPL-MCNC: 14 MG/DL — SIGNIFICANT CHANGE UP (ref 10–20)
CALCIUM SERPL-MCNC: 8.9 MG/DL — SIGNIFICANT CHANGE UP (ref 8.5–10.1)
CHLORIDE SERPL-SCNC: 103 MMOL/L — SIGNIFICANT CHANGE UP (ref 98–110)
CK SERPL-CCNC: 152 U/L — SIGNIFICANT CHANGE UP (ref 0–225)
CO2 SERPL-SCNC: 25 MMOL/L — SIGNIFICANT CHANGE UP (ref 17–32)
CREAT SERPL-MCNC: 0.8 MG/DL — SIGNIFICANT CHANGE UP (ref 0.7–1.5)
EGFR: 95 ML/MIN/1.73M2 — SIGNIFICANT CHANGE UP
GLUCOSE BLDC GLUCOMTR-MCNC: 110 MG/DL — HIGH (ref 70–99)
GLUCOSE BLDC GLUCOMTR-MCNC: 117 MG/DL — HIGH (ref 70–99)
GLUCOSE BLDC GLUCOMTR-MCNC: 125 MG/DL — HIGH (ref 70–99)
GLUCOSE BLDC GLUCOMTR-MCNC: 158 MG/DL — HIGH (ref 70–99)
GLUCOSE SERPL-MCNC: 124 MG/DL — HIGH (ref 70–99)
HCT VFR BLD CALC: 46.7 % — SIGNIFICANT CHANGE UP (ref 42–52)
HGB BLD-MCNC: 16 G/DL — SIGNIFICANT CHANGE UP (ref 14–18)
MAGNESIUM SERPL-MCNC: 1.9 MG/DL — SIGNIFICANT CHANGE UP (ref 1.8–2.4)
MCHC RBC-ENTMCNC: 31.2 PG — HIGH (ref 27–31)
MCHC RBC-ENTMCNC: 34.3 G/DL — SIGNIFICANT CHANGE UP (ref 32–37)
MCV RBC AUTO: 91 FL — SIGNIFICANT CHANGE UP (ref 80–94)
NRBC # BLD: 0 /100 WBCS — SIGNIFICANT CHANGE UP (ref 0–0)
PHOSPHATE SERPL-MCNC: 3.6 MG/DL — SIGNIFICANT CHANGE UP (ref 2.1–4.9)
PLATELET # BLD AUTO: 178 K/UL — SIGNIFICANT CHANGE UP (ref 130–400)
POTASSIUM SERPL-MCNC: 4.1 MMOL/L — SIGNIFICANT CHANGE UP (ref 3.5–5)
POTASSIUM SERPL-SCNC: 4.1 MMOL/L — SIGNIFICANT CHANGE UP (ref 3.5–5)
RBC # BLD: 5.13 M/UL — SIGNIFICANT CHANGE UP (ref 4.7–6.1)
RBC # FLD: 12.7 % — SIGNIFICANT CHANGE UP (ref 11.5–14.5)
SODIUM SERPL-SCNC: 140 MMOL/L — SIGNIFICANT CHANGE UP (ref 135–146)
WBC # BLD: 5.68 K/UL — SIGNIFICANT CHANGE UP (ref 4.8–10.8)
WBC # FLD AUTO: 5.68 K/UL — SIGNIFICANT CHANGE UP (ref 4.8–10.8)

## 2022-07-23 PROCEDURE — 99233 SBSQ HOSP IP/OBS HIGH 50: CPT

## 2022-07-23 RX ORDER — TAMSULOSIN HYDROCHLORIDE 0.4 MG/1
0.4 CAPSULE ORAL AT BEDTIME
Refills: 0 | Status: DISCONTINUED | OUTPATIENT
Start: 2022-07-23 | End: 2022-07-27

## 2022-07-23 RX ORDER — LEVETIRACETAM 250 MG/1
1000 TABLET, FILM COATED ORAL
Refills: 0 | Status: DISCONTINUED | OUTPATIENT
Start: 2022-07-23 | End: 2022-07-27

## 2022-07-23 RX ORDER — LABETALOL HCL 100 MG
10 TABLET ORAL ONCE
Refills: 0 | Status: COMPLETED | OUTPATIENT
Start: 2022-07-23 | End: 2022-07-23

## 2022-07-23 RX ADMIN — Medication 81 MILLIGRAM(S): at 12:32

## 2022-07-23 RX ADMIN — Medication 10 MILLIGRAM(S): at 05:24

## 2022-07-23 RX ADMIN — Medication 10 MILLIGRAM(S): at 21:30

## 2022-07-23 RX ADMIN — HEPARIN SODIUM 5000 UNIT(S): 5000 INJECTION INTRAVENOUS; SUBCUTANEOUS at 14:00

## 2022-07-23 RX ADMIN — CLOPIDOGREL BISULFATE 75 MILLIGRAM(S): 75 TABLET, FILM COATED ORAL at 12:32

## 2022-07-23 RX ADMIN — Medication 20 MILLIGRAM(S): at 08:19

## 2022-07-23 RX ADMIN — CITALOPRAM 20 MILLIGRAM(S): 10 TABLET, FILM COATED ORAL at 12:32

## 2022-07-23 RX ADMIN — HEPARIN SODIUM 5000 UNIT(S): 5000 INJECTION INTRAVENOUS; SUBCUTANEOUS at 05:24

## 2022-07-23 RX ADMIN — HEPARIN SODIUM 5000 UNIT(S): 5000 INJECTION INTRAVENOUS; SUBCUTANEOUS at 21:14

## 2022-07-23 RX ADMIN — LEVETIRACETAM 750 MILLIGRAM(S): 250 TABLET, FILM COATED ORAL at 05:24

## 2022-07-23 RX ADMIN — Medication 10 MILLIGRAM(S): at 17:31

## 2022-07-23 RX ADMIN — Medication 20 MILLIGRAM(S): at 17:32

## 2022-07-23 RX ADMIN — ATORVASTATIN CALCIUM 40 MILLIGRAM(S): 80 TABLET, FILM COATED ORAL at 21:14

## 2022-07-23 RX ADMIN — TAMSULOSIN HYDROCHLORIDE 0.4 MILLIGRAM(S): 0.4 CAPSULE ORAL at 22:16

## 2022-07-23 RX ADMIN — OXCARBAZEPINE 300 MILLIGRAM(S): 300 TABLET, FILM COATED ORAL at 17:33

## 2022-07-23 RX ADMIN — LEVETIRACETAM 750 MILLIGRAM(S): 250 TABLET, FILM COATED ORAL at 17:30

## 2022-07-23 RX ADMIN — OXCARBAZEPINE 300 MILLIGRAM(S): 300 TABLET, FILM COATED ORAL at 05:24

## 2022-07-23 NOTE — PROGRESS NOTE ADULT - SUBJECTIVE AND OBJECTIVE BOX
Neurology Progress Note    Interval History:    Patient seen and examined at bedside. There were no acute events overnight. Patient states he has continued dizziness and lightheadedness with standing and walking. Denied any other complaints.      PAST MEDICAL & SURGICAL HISTORY:  Nonintractable epilepsy without status epilepticus, unspecified epilepsy type  Neuropathy  Hypertension, unspecified type  Type 2 diabetes mellitus with complication, without long-term current use of insulin  Anxiety  Cerebrovascular accident (CVA), unspecified mechanism  History of surgery  BROKEN LEG SURGERY  Capsular cataract of right eye      Medications:  aspirin  chewable 81 milliGRAM(s) Enteral Tube daily  atorvastatin 40 milliGRAM(s) Oral at bedtime  busPIRone 10 milliGRAM(s) Oral two times a day  citalopram 20 milliGRAM(s) Oral daily  clopidogrel Tablet 75 milliGRAM(s) Oral daily  enalapril 20 milliGRAM(s) Oral two times a day  heparin   Injectable 5000 Unit(s) SubCutaneous every 8 hours  levETIRAcetam 750 milliGRAM(s) Oral two times a day  OXcarbazepine 300 milliGRAM(s) Oral two times a day      Vital Signs Last 24 Hrs  T(C): 35.6 (23 Jul 2022 13:28), Max: 36.9 (22 Jul 2022 21:41)  T(F): 96.1 (23 Jul 2022 13:28), Max: 98.5 (22 Jul 2022 21:41)  HR: 72 (23 Jul 2022 13:28) (71 - 88)  BP: 179/79 (23 Jul 2022 13:28) (130/82 - 180/72)  RR: 18 (23 Jul 2022 05:28) (18 - 18)      Neurological Examination:  General:  Appearance is consistent with chronologic age.  Mask facies.  Gross skin survey within normal limits.    Cognitive/Language:  Awake, alert, and oriented to person, place, time and date.  Recent and remote memory intact.  Fund of knowledge is appropriate.  Naming, repetition and comprehension intact.   Nondysarthric.    Cranial Nerves  - Eyes: Visual acuity intact, Visual fields full.  EOMI w/o nystagmus, skew or reported double vision.  PERRL.   - Face:  Facial sensation normal V1 - 3, no facial asymmetry.    - Ears/Nose/Throat:  Hearing grossly intact b/l to finger rub.  Palate elevates midline.  Tongue and uvula midline.   Motor examination:  Upper Extremities: L 5/5, R 5/5; Lower extremities: L 4+/5, R 5/5.  No observable drift. Normal tone and bulk. normal tone in all extremities (less than yesterday)  Sensory examination:   Intact to light touch and pinprick, pain, temperature and proprioception and vibration in all extremities.  Reflexes:  2+ b/l biceps, triceps, brachioradialis, 3+ patella and 2+ achilles.    Cerebellum: negative for ataxia       Labs:  CBC Full  -  ( 23 Jul 2022 04:30 )  WBC Count : 5.68 K/uL  RBC Count : 5.13 M/uL  Hemoglobin : 16.0 g/dL  Hematocrit : 46.7 %  Platelet Count - Automated : 178 K/uL  Mean Cell Volume : 91.0 fL  Mean Cell Hemoglobin : 31.2 pg  Mean Cell Hemoglobin Concentration : 34.3 g/dL      07-23    140  |  103  |  14  ----------------------------<  124<H>  4.1   |  25  |  0.8    Ca    8.9      23 Jul 2022 04:30  Phos  3.6     07-23  Mg     1.9     07-23      < from: MR Head No Cont (07.22.22 @ 17:20) >  PROCEDURE DATE:  07/22/2022          INTERPRETATION:  Clinical History / Reason for exam: Weakness. Stroke   evaluation.    TECHNIQUE: Multiplanar and multisequence MR examination of the brain   without contrast.    COMPARISON: MR brain 6/24/2016    FINDINGS:    There is no evidence of acute infarct, large intraparenchymal hemorrhage,   mass effect or midline shift.    Ventricles and sulci are age-appropriate in size.    There is no extra-axial fluid collection.    Major intracranial flow-voids are preserved.    T2/FLAIR periventricular white matter hyperintense signal compatible with   moderate chronic microvascular ischemic changes. Again noted is a   anterior right coronal radiata lacunar infarct. Nuclear infarct is seen   involving the posterior left corona radiata, left ana, right frontal   subcortical and right centrum semiovale region.    Subcentimeter foci of blooming artifact on GRE within the basal ganglia,   left occipital, bilateral cerebellar lobes. This could be the result of   old hemorrhages areas of mineralization or small cavernous angiomas.    The orbits, sellar and suprasellar structures, and craniocervical   junction are unremarkable.    The calvarium demonstrates normal signal intensity.    The visualized paranasal sinuses and tympanomastoid cavities are clear.      IMPRESSION:    No MR evidence of acute infarct or large intraparenchymal hemorrhage.    Old infarcts as described above.    --- End of Report ---    < end of copied text >    < from: MR Cervical Spine No Cont (07.22.22 @ 18:10) >  PROCEDURE DATE:  07/22/2022          INTERPRETATION:  Clinical History / Reason for exam: Weakness and   spasticity    TECHNIQUE: Multiplanar multisequence MR imaging of the cervical spine was   performed without the administration of intravenous contrast, according   to standard protocol.    COMPARISON: MR cervical spine 10/6/2021    FINDINGS:    Loss of the normal cervical lordosis is again seen.    The vertebral body height and alignment appear normal    Hemangioma versus focal areas of fat is again seen involving the T1   vertebral body.    Abnormal T1 and T2 prolongation along the inferior endplate of C2 is seen   posteriorly. This is new when compared to prior exam and islikely   related to Modic type I degenerative changes.    Disc desiccation is seen throughout the cervical spine region which   secondary too chronic degenerative change    C2-3: Disc osteophyte complex is seen which is more prominent on the left   side. Effacement of the ventral thecal sac is seen on the left side.   Bilateral hypertrophic facet joint changes are seen. Moderate narrowing   of both neural foramen.    C3-4: Disc osteophyte complex is seen which more prominent on the left   side. Minimal effacement of the ventral thecal sac seen on the left side   with minimal effacement of ventral spinal cord is seen on the left side.   Bilateral hypertrophic facet joint changes. Mild to moderate narrowing of   both neural foramen    C4-5: Disc bulge and bilateral hypertrophic facet joint changes. Mild to   moderate narrowing of the spinal canal is seen which is more prominent on   the left side. Moderate to severe narrowing of the left neural foramen is   seen.    C5-6: Disc bulge is seen which is more prominent on left side. Minimal   effacement of the ventral thecal sac is seen on left side. Severe   narrowing of the left neural foramen is seen.    C6-7: Disc bulge is seen. No significant commas of the spinal canal or   either neural foramen    C7-T1: Normal    The spinal cord demonstrates normal signal and caliber.    Evaluation of the paraspinal soft tissues appear normal.    IMPRESSION: Degenerative changes again seen as described above.    --- End of Report ---    < end of copied text >

## 2022-07-23 NOTE — PROGRESS NOTE ADULT - ASSESSMENT
Imp ; rehab of r/o cva  Plan : continue bedside therapy as tolerated            d/c plan is to home with services when medically cleared

## 2022-07-23 NOTE — PROGRESS NOTE ADULT - ASSESSMENT
This is a 71 M with PMHx of stroke, partial seizures (follows with Dr. Bullock), ADAIR, DM, DLD, BPH, gait disorder (mainly mechanical), presented to the ED for AMS and dizziness that began last night around 23:00. Per patient and son at bedside, patient was sitting on the sofa last night watching television when he suddenly began to feel strange.  On ED arrival, CTH showed new focal hypodensities involving the left cerebellar hemisphere as well as the left periatrial white matter consistent with age indeterminate lacunar infarcts. Per son, patient's memory and cognition have been gradually declining over the last 2 months.     Plan:  Neuro  - CTH: new focal hypodensity involving the left cerebellar hemisphere as well as the left periatrial white matter consistent with age indeterminate lacunar infarcts.  - MR brain non con reviewed, showed no acute infarct but did show chronic infarcts  - MR C-spine non con reviewed, degenerative changes seen   - F/u Echo  - Continue ASA 81mg qd  - Continue Plavix 75mg qd  - Continue home Lipitor 40mg qd  - LDL: 52, A1c: 6.6   - Q4 neuro checks  - Can follow up outpatient for Neuropsych eval for possible underlying dementia    #Seizure Disorder  - C/w home Keppra, can increase to 1000 mg BID  - C/w home Oxcarbazepine 300mg BID  - F/U REEG     Cardio  #HTN  - Continue home enalapril 20mg daily   - Monitor vitals     #Dyslipidemia  - Atorvastatin 40mg daily as above    Psych  #Depression  - Continue buspirone 10 mg and citalopram 20mg daily     Endo  #DM  - ISS  - Fingersticks before meals and at bedtime    BPH  - C/w home Oxybutynin 15mg daily      Misc  - Diet: Carb Consistent  - Activity: IAT  - DVT Prophylaxis: Lovenox 40mg daily  - GI Prophylaxis: None  - Code Status: full code  - Disposition: acute   This is a 71 M with PMHx of stroke, partial seizures (follows with Dr. Bullock), ADAIR, DM, DLD, BPH, gait disorder (mainly mechanical), presented to the ED for AMS and dizziness that began last night around 23:00. Per patient and son at bedside, patient was sitting on the sofa last night watching television when he suddenly began to feel strange.  On ED arrival, CTH showed new focal hypodensities involving the left cerebellar hemisphere as well as the left periatrial white matter consistent with age indeterminate lacunar infarcts. Per son, patient's memory and cognition have been gradually declining over the last 2 months.     Plan:  Neuro  - CTH: new focal hypodensity involving the left cerebellar hemisphere as well as the left periatrial white matter consistent with age indeterminate lacunar infarcts.  - MR brain non con reviewed, showed no acute infarct but did show chronic infarcts  - MR C-spine non con reviewed, degenerative changes seen   - F/u Echo  - Continue ASA 81mg qd  - Continue Plavix 75mg qd  - Continue home Lipitor 40mg qd  - LDL: 52, A1c: 6.6   - Q4 neuro checks  - Can follow up outpatient for Neuropsych eval for possible underlying dementia    #Seizure Disorder  - C/w home Keppra, can increase to 1000 mg BID  - C/w home Oxcarbazepine 300mg BID    Cardio  #HTN  - Continue home enalapril 20mg daily   - Monitor vitals     #Dyslipidemia  - Atorvastatin 40mg daily as above    Psych  #Depression  - Continue buspirone 10 mg and citalopram 20mg daily     Endo  #DM  - ISS  - Fingersticks before meals and at bedtime    BPH  - C/w home Oxybutynin 15mg daily      Misc  - Diet: Carb Consistent  - Activity: IAT  - DVT Prophylaxis: Lovenox 40mg daily  - GI Prophylaxis: None  - Code Status: full code  - Disposition: acute

## 2022-07-23 NOTE — PROGRESS NOTE ADULT - SUBJECTIVE AND OBJECTIVE BOX
Patient is a 72y old  Male who presents with a chief complaint of Stroke workup      HPI:  Patient is a 71M with PMH of CVA, partial seizures (follows with Dr. Bullock), ADAIR, DM, DLD, BPH, gait disorder (mainly mechanical), presented to the ED for AMS and dizziness that began last night around 23:00. Per patient and son at bedside, patient was sitting on the sofa last night watching television when he suddenly began to feel strange. Son mentions that patient started hallucinating seeing cartoon characters in the air. Patient subsequently attempted to stand up to go to the bathroom but immediately required assistance from his son as he felt dizzy and had a sensation of the room spinning. He went to bed but woke up several times throughout the night, still confused and still dizzy. The symptoms were still present this morning, prompting a visit to the ED. On ED arrival, CTH showed new focal hypodensities involving the left cerebellar hemisphere as well as the left periatrial white matter consistent with age indeterminate lacunar infarcts. Per son, patient's memory and cognition have been gradually declining over the last 2 months. He often forgets family members, forgets where he is, and at times talks nonsense. Son endorses that patient has been taking all of his medications, including AEDs (Keppra 750mg BID, Oxcarbazepine 300mg BID), as prescribed.    #AMS/Dizziness/Abnormal CTH R/o CVA  - CTH: new focal hypodensities involving the left cerebellar hemisphere as well as the left periatrial white matter consistent with age indeterminate lacunar infarcts.  - f/u MR brain noncon, MR angio, MR C-spine noncon  - f/u Echo        PHYSICAL EXAM    Vital Signs Last 24 Hrs  T(C): 35.6 (23 Jul 2022 05:28), Max: 36.9 (22 Jul 2022 21:41)  T(F): 96 (23 Jul 2022 05:28), Max: 98.5 (22 Jul 2022 21:41)  HR: 71 (23 Jul 2022 08:17) (71 - 88)  BP: 178/89 (23 Jul 2022 08:17) (130/82 - 180/72)  BP(mean): 101 (22 Jul 2022 13:50) (101 - 101)  RR: 18 (23 Jul 2022 05:28) (18 - 18)  SpO2: --        Constitutional - NAD  Chest - CTA  Cardiovascular - S1S2+  Abdomen -  Soft  Extremities -  No calf tenderness   Function : bed mobility / transfer CG                 ambulate 40'RW CG    aspirin  chewable 81 milliGRAM(s) Enteral Tube daily  atorvastatin 40 milliGRAM(s) Oral at bedtime  busPIRone 10 milliGRAM(s) Oral two times a day  citalopram 20 milliGRAM(s) Oral daily  clopidogrel Tablet 75 milliGRAM(s) Oral daily  enalapril 20 milliGRAM(s) Oral two times a day  heparin   Injectable 5000 Unit(s) SubCutaneous every 8 hours  levETIRAcetam 750 milliGRAM(s) Oral two times a day  OXcarbazepine 300 milliGRAM(s) Oral two times a day      RECENT LABS/IMAGING                        16.0   5.68  )-----------( 178      ( 23 Jul 2022 04:30 )             46.7     07-23    140  |  103  |  14  ----------------------------<  124<H>  4.1   |  25  |  0.8    Ca    8.9      23 Jul 2022 04:30  Phos  3.6     07-23  Mg     1.9     07-23

## 2022-07-23 NOTE — PROGRESS NOTE ADULT - SUBJECTIVE AND OBJECTIVE BOX
INTERVAL HPI/OVERNIGHT EVENTS:    SUBJECTIVE: Patient seen and examined at bedside.     no cp, sob, abd pain, fever  no ha, dizziness, lightheadedness, syncope    OBJECTIVE:    VITAL SIGNS:  Vital Signs Last 24 Hrs  T(C): 35.6 (23 Jul 2022 13:28), Max: 36.9 (22 Jul 2022 21:41)  T(F): 96.1 (23 Jul 2022 13:28), Max: 98.5 (22 Jul 2022 21:41)  HR: 72 (23 Jul 2022 13:28) (71 - 88)  BP: 178/89 (23 Jul 2022 08:17) (130/82 - 180/72)  BP(mean): 101 (22 Jul 2022 13:50) (101 - 101)  RR: 18 (23 Jul 2022 05:28) (18 - 18)  SpO2: --          PHYSICAL EXAM:    General: NAD  HEENT: NC/AT; PERRL, clear conjunctiva  Neck: supple  Respiratory: CTA b/l  Cardiovascular: +S1/S2; RRR  Abdomen: soft, NT/ND; +BS x4  Extremities: WWP, 2+ peripheral pulses b/l; no LE edema  Skin: normal color and turgor; no rash  Neurological:    MEDICATIONS:  MEDICATIONS  (STANDING):  aspirin  chewable 81 milliGRAM(s) Enteral Tube daily  atorvastatin 40 milliGRAM(s) Oral at bedtime  busPIRone 10 milliGRAM(s) Oral two times a day  citalopram 20 milliGRAM(s) Oral daily  clopidogrel Tablet 75 milliGRAM(s) Oral daily  enalapril 20 milliGRAM(s) Oral two times a day  heparin   Injectable 5000 Unit(s) SubCutaneous every 8 hours  levETIRAcetam 750 milliGRAM(s) Oral two times a day  OXcarbazepine 300 milliGRAM(s) Oral two times a day    MEDICATIONS  (PRN):      ALLERGIES:  Allergies    No Known Allergies    Intolerances        LABS:                        16.0   5.68  )-----------( 178      ( 23 Jul 2022 04:30 )             46.7     Hemoglobin: 16.0 g/dL (07-23 @ 04:30)  Hemoglobin: 14.7 g/dL (07-22 @ 06:24)  Hemoglobin: 14.8 g/dL (07-20 @ 10:40)    CBC Full  -  ( 23 Jul 2022 04:30 )  WBC Count : 5.68 K/uL  RBC Count : 5.13 M/uL  Hemoglobin : 16.0 g/dL  Hematocrit : 46.7 %  Platelet Count - Automated : 178 K/uL  Mean Cell Volume : 91.0 fL  Mean Cell Hemoglobin : 31.2 pg  Mean Cell Hemoglobin Concentration : 34.3 g/dL  Auto Neutrophil # : x  Auto Lymphocyte # : x  Auto Monocyte # : x  Auto Eosinophil # : x  Auto Basophil # : x  Auto Neutrophil % : x  Auto Lymphocyte % : x  Auto Monocyte % : x  Auto Eosinophil % : x  Auto Basophil % : x    07-23    140  |  103  |  14  ----------------------------<  124<H>  4.1   |  25  |  0.8    Ca    8.9      23 Jul 2022 04:30  Phos  3.6     07-23  Mg     1.9     07-23      Creatinine Trend: 0.8<--, 0.9<--, 0.8<--, 1.0<--        hs Troponin:              CSF:                      EKG:   MICROBIOLOGY:    IMAGING:      Labs, imaging, EKG personally reviewed    RADIOLOGY & ADDITIONAL TESTS: Reviewed.

## 2022-07-24 LAB
GLUCOSE BLDC GLUCOMTR-MCNC: 108 MG/DL — HIGH (ref 70–99)
GLUCOSE BLDC GLUCOMTR-MCNC: 138 MG/DL — HIGH (ref 70–99)
GLUCOSE BLDC GLUCOMTR-MCNC: 154 MG/DL — HIGH (ref 70–99)
GLUCOSE BLDC GLUCOMTR-MCNC: 243 MG/DL — HIGH (ref 70–99)

## 2022-07-24 PROCEDURE — 99233 SBSQ HOSP IP/OBS HIGH 50: CPT

## 2022-07-24 RX ORDER — GLUCAGON INJECTION, SOLUTION 0.5 MG/.1ML
1 INJECTION, SOLUTION SUBCUTANEOUS ONCE
Refills: 0 | Status: DISCONTINUED | OUTPATIENT
Start: 2022-07-24 | End: 2022-07-27

## 2022-07-24 RX ORDER — DEXTROSE 50 % IN WATER 50 %
25 SYRINGE (ML) INTRAVENOUS ONCE
Refills: 0 | Status: DISCONTINUED | OUTPATIENT
Start: 2022-07-24 | End: 2022-07-27

## 2022-07-24 RX ORDER — DEXTROSE 50 % IN WATER 50 %
12.5 SYRINGE (ML) INTRAVENOUS ONCE
Refills: 0 | Status: DISCONTINUED | OUTPATIENT
Start: 2022-07-24 | End: 2022-07-27

## 2022-07-24 RX ORDER — SODIUM CHLORIDE 9 MG/ML
1000 INJECTION, SOLUTION INTRAVENOUS
Refills: 0 | Status: DISCONTINUED | OUTPATIENT
Start: 2022-07-24 | End: 2022-07-27

## 2022-07-24 RX ORDER — DEXTROSE 50 % IN WATER 50 %
15 SYRINGE (ML) INTRAVENOUS ONCE
Refills: 0 | Status: DISCONTINUED | OUTPATIENT
Start: 2022-07-24 | End: 2022-07-27

## 2022-07-24 RX ORDER — INSULIN LISPRO 100/ML
VIAL (ML) SUBCUTANEOUS
Refills: 0 | Status: DISCONTINUED | OUTPATIENT
Start: 2022-07-24 | End: 2022-07-27

## 2022-07-24 RX ADMIN — TAMSULOSIN HYDROCHLORIDE 0.4 MILLIGRAM(S): 0.4 CAPSULE ORAL at 21:14

## 2022-07-24 RX ADMIN — HEPARIN SODIUM 5000 UNIT(S): 5000 INJECTION INTRAVENOUS; SUBCUTANEOUS at 21:14

## 2022-07-24 RX ADMIN — CITALOPRAM 20 MILLIGRAM(S): 10 TABLET, FILM COATED ORAL at 11:53

## 2022-07-24 RX ADMIN — Medication 20 MILLIGRAM(S): at 05:17

## 2022-07-24 RX ADMIN — OXCARBAZEPINE 300 MILLIGRAM(S): 300 TABLET, FILM COATED ORAL at 05:18

## 2022-07-24 RX ADMIN — Medication 10 MILLIGRAM(S): at 17:40

## 2022-07-24 RX ADMIN — LEVETIRACETAM 1000 MILLIGRAM(S): 250 TABLET, FILM COATED ORAL at 05:21

## 2022-07-24 RX ADMIN — Medication 1: at 17:38

## 2022-07-24 RX ADMIN — Medication 81 MILLIGRAM(S): at 11:53

## 2022-07-24 RX ADMIN — OXCARBAZEPINE 300 MILLIGRAM(S): 300 TABLET, FILM COATED ORAL at 17:39

## 2022-07-24 RX ADMIN — Medication 20 MILLIGRAM(S): at 17:40

## 2022-07-24 RX ADMIN — CLOPIDOGREL BISULFATE 75 MILLIGRAM(S): 75 TABLET, FILM COATED ORAL at 11:53

## 2022-07-24 RX ADMIN — ATORVASTATIN CALCIUM 40 MILLIGRAM(S): 80 TABLET, FILM COATED ORAL at 21:15

## 2022-07-24 RX ADMIN — HEPARIN SODIUM 5000 UNIT(S): 5000 INJECTION INTRAVENOUS; SUBCUTANEOUS at 05:15

## 2022-07-24 RX ADMIN — LEVETIRACETAM 1000 MILLIGRAM(S): 250 TABLET, FILM COATED ORAL at 17:40

## 2022-07-24 RX ADMIN — HEPARIN SODIUM 5000 UNIT(S): 5000 INJECTION INTRAVENOUS; SUBCUTANEOUS at 13:34

## 2022-07-24 RX ADMIN — Medication 10 MILLIGRAM(S): at 05:21

## 2022-07-24 NOTE — PROGRESS NOTE ADULT - SUBJECTIVE AND OBJECTIVE BOX
INTERVAL HPI/OVERNIGHT EVENTS:    SUBJECTIVE: Patient seen and examined at bedside.     no cp, sob, abd pain, fever  no ha, dizziness, lightheadedness, syncope    OBJECTIVE:    VITAL SIGNS:  Vital Signs Last 24 Hrs  T(C): 35.7 (24 Jul 2022 05:01), Max: 36 (23 Jul 2022 20:40)  T(F): 96.2 (24 Jul 2022 05:01), Max: 96.8 (23 Jul 2022 20:40)  HR: 67 (24 Jul 2022 05:01) (67 - 72)  BP: 165/77 (24 Jul 2022 05:01) (165/77 - 196/91)  BP(mean): 128 (23 Jul 2022 23:19) (128 - 131)  RR: 18 (24 Jul 2022 05:01) (18 - 18)  SpO2: --          PHYSICAL EXAM:    General: NAD  HEENT: NC/AT; PERRL, clear conjunctiva  Neck: supple  Respiratory: CTA b/l  Cardiovascular: +S1/S2; RRR  Abdomen: soft, NT/ND; +BS x4  Extremities: WWP, 2+ peripheral pulses b/l; no LE edema  Skin: normal color and turgor; no rash  Neurological:    MEDICATIONS:  MEDICATIONS  (STANDING):  aspirin  chewable 81 milliGRAM(s) Enteral Tube daily  atorvastatin 40 milliGRAM(s) Oral at bedtime  busPIRone 10 milliGRAM(s) Oral two times a day  citalopram 20 milliGRAM(s) Oral daily  clopidogrel Tablet 75 milliGRAM(s) Oral daily  enalapril 20 milliGRAM(s) Oral two times a day  heparin   Injectable 5000 Unit(s) SubCutaneous every 8 hours  levETIRAcetam 1000 milliGRAM(s) Oral two times a day  OXcarbazepine 300 milliGRAM(s) Oral two times a day  tamsulosin 0.4 milliGRAM(s) Oral at bedtime    MEDICATIONS  (PRN):      ALLERGIES:  Allergies    No Known Allergies    Intolerances        LABS:                        16.0   5.68  )-----------( 178      ( 23 Jul 2022 04:30 )             46.7     Hemoglobin: 16.0 g/dL (07-23 @ 04:30)  Hemoglobin: 14.7 g/dL (07-22 @ 06:24)  Hemoglobin: 14.8 g/dL (07-20 @ 10:40)    CBC Full  -  ( 23 Jul 2022 04:30 )  WBC Count : 5.68 K/uL  RBC Count : 5.13 M/uL  Hemoglobin : 16.0 g/dL  Hematocrit : 46.7 %  Platelet Count - Automated : 178 K/uL  Mean Cell Volume : 91.0 fL  Mean Cell Hemoglobin : 31.2 pg  Mean Cell Hemoglobin Concentration : 34.3 g/dL  Auto Neutrophil # : x  Auto Lymphocyte # : x  Auto Monocyte # : x  Auto Eosinophil # : x  Auto Basophil # : x  Auto Neutrophil % : x  Auto Lymphocyte % : x  Auto Monocyte % : x  Auto Eosinophil % : x  Auto Basophil % : x    07-23    140  |  103  |  14  ----------------------------<  124<H>  4.1   |  25  |  0.8    Ca    8.9      23 Jul 2022 04:30  Phos  3.6     07-23  Mg     1.9     07-23      Creatinine Trend: 0.8<--, 0.9<--, 0.8<--, 1.0<--        hs Troponin:              CSF:                      EKG:   MICROBIOLOGY:    IMAGING:      Labs, imaging, EKG personally reviewed    RADIOLOGY & ADDITIONAL TESTS: Reviewed.

## 2022-07-24 NOTE — PROGRESS NOTE ADULT - SUBJECTIVE AND OBJECTIVE BOX
Neurology Progress Note    Interval History:    Patient seen and examined at bedside. There were no acute events overnight. Patient states he feels better. Denied any other complaints.      PAST MEDICAL & SURGICAL HISTORY:  Nonintractable epilepsy without status epilepticus, unspecified epilepsy type  Neuropathy  Hypertension, unspecified type  Type 2 diabetes mellitus with complication, without long-term current use of insulin  Anxiety  Cerebrovascular accident (CVA), unspecified mechanism  History of surgery  BROKEN LEG SURGERY  Capsular cataract of right eye      MEDICATIONS  (STANDING):  aspirin  chewable 81 milliGRAM(s) Enteral Tube daily  atorvastatin 40 milliGRAM(s) Oral at bedtime  busPIRone 10 milliGRAM(s) Oral two times a day  citalopram 20 milliGRAM(s) Oral daily  clopidogrel Tablet 75 milliGRAM(s) Oral daily  dextrose 5%. 1000 milliLiter(s) (50 mL/Hr) IV Continuous <Continuous>  dextrose 5%. 1000 milliLiter(s) (100 mL/Hr) IV Continuous <Continuous>  dextrose 50% Injectable 25 Gram(s) IV Push once  dextrose 50% Injectable 12.5 Gram(s) IV Push once  dextrose 50% Injectable 25 Gram(s) IV Push once  enalapril 20 milliGRAM(s) Oral two times a day  glucagon  Injectable 1 milliGRAM(s) IntraMuscular once  heparin   Injectable 5000 Unit(s) SubCutaneous every 8 hours  insulin lispro (ADMELOG) corrective regimen sliding scale   SubCutaneous three times a day before meals  levETIRAcetam 1000 milliGRAM(s) Oral two times a day  OXcarbazepine 300 milliGRAM(s) Oral two times a day  tamsulosin 0.4 milliGRAM(s) Oral at bedtime    MEDICATIONS  (PRN):  dextrose Oral Gel 15 Gram(s) Oral once PRN Blood Glucose LESS THAN 70 milliGRAM(s)/deciliter      Vital Signs Last 24 Hrs  T(C): 35.7 (24 Jul 2022 05:01), Max: 36 (23 Jul 2022 20:40)  T(F): 96.2 (24 Jul 2022 05:01), Max: 96.8 (23 Jul 2022 20:40)  HR: 67 (24 Jul 2022 05:01) (67 - 72)  BP: 165/77 (24 Jul 2022 05:01) (165/77 - 196/91)  BP(mean): 128 (23 Jul 2022 23:19) (128 - 131)  RR: 18 (24 Jul 2022 05:01) (18 - 18)  SpO2: --          Neurological Examination:  General:  Appearance is consistent with chronologic age.  Mask facies.  Gross skin survey within normal limits.    Cognitive/Language:  Awake, alert, and oriented to person, place, time and date.  Recent and remote memory intact.  Fund of knowledge is appropriate.  Naming, repetition and comprehension intact.   Nondysarthric.    Cranial Nerves  - Eyes: Visual acuity intact, Visual fields full.  EOMI w/o nystagmus, skew or reported double vision.  PERRL.   - Face:  Facial sensation normal V1 - 3, no facial asymmetry.    - Ears/Nose/Throat:  Hearing grossly intact b/l to finger rub.  Palate elevates midline.  Tongue and uvula midline.   Motor examination:  Upper Extremities: L 5/5, R 5/5; Lower extremities: L 5/5, R 5/5.  No observable drift. Normal tone and bulk. Normal tone in all extremities   Sensory examination:   Intact to light touch and pinprick, and pain in all extremities.  Reflexes:  2+ b/l biceps, triceps, brachioradialis, 3+ patella and 2+ achilles.    Cerebellum: negative for ataxia         LABS:  07-23 @ 04:30 Creatine 152 U/L [0 - 225]  cret                        16.0   5.68  )-----------( 178      ( 23 Jul 2022 04:30 )             46.7     07-23    140  |  103  |  14  ----------------------------<  124<H>  4.1   |  25  |  0.8    Ca    8.9      23 Jul 2022 04:30  Phos  3.6     07-23  Mg     1.9     07-23        INTERPRETATION:  Clinical History / Reason for exam: Weakness. Stroke   evaluation.    TECHNIQUE: Multiplanar and multisequence MR examination of the brain   without contrast.    COMPARISON: MR brain 6/24/2016    FINDINGS:    There is no evidence of acute infarct, large intraparenchymal hemorrhage,   mass effect or midline shift.    Ventricles and sulci are age-appropriate in size.    There is no extra-axial fluid collection.    Major intracranial flow-voids are preserved.    T2/FLAIR periventricular white matter hyperintense signal compatible with   moderate chronic microvascular ischemic changes. Again noted is a   anterior right coronal radiata lacunar infarct. Nuclear infarct is seen   involving the posterior left corona radiata, left ana, right frontal   subcortical and right centrum semiovale region.    Subcentimeter foci of blooming artifact on GRE within the basal ganglia,   left occipital, bilateral cerebellar lobes. This could be the result of   old hemorrhages areas of mineralization or small cavernous angiomas.    The orbits, sellar and suprasellar structures, and craniocervical   junction are unremarkable.    The calvarium demonstrates normal signal intensity.    The visualized paranasal sinuses and tympanomastoid cavities are clear.      IMPRESSION:    No MR evidence of acute infarct or large intraparenchymal hemorrhage.    Old infarcts as described above.    --- End of Report ---    < end of copied text >    < from: MR Cervical Spine No Cont (07.22.22 @ 18:10) >  PROCEDURE DATE:  07/22/2022          INTERPRETATION:  Clinical History / Reason for exam: Weakness and   spasticity    TECHNIQUE: Multiplanar multisequence MR imaging of the cervical spine was   performed without the administration of intravenous contrast, according   to standard protocol.    COMPARISON: MR cervical spine 10/6/2021    FINDINGS:    Loss of the normal cervical lordosis is again seen.    The vertebral body height and alignment appear normal    Hemangioma versus focal areas of fat is again seen involving the T1   vertebral body.    Abnormal T1 and T2 prolongation along the inferior endplate of C2 is seen   posteriorly. This is new when compared to prior exam and islikely   related to Modic type I degenerative changes.    Disc desiccation is seen throughout the cervical spine region which   secondary too chronic degenerative change    C2-3: Disc osteophyte complex is seen which is more prominent on the left   side. Effacement of the ventral thecal sac is seen on the left side.   Bilateral hypertrophic facet joint changes are seen. Moderate narrowing   of both neural foramen.    C3-4: Disc osteophyte complex is seen which more prominent on the left   side. Minimal effacement of the ventral thecal sac seen on the left side   with minimal effacement of ventral spinal cord is seen on the left side.   Bilateral hypertrophic facet joint changes. Mild to moderate narrowing of   both neural foramen    C4-5: Disc bulge and bilateral hypertrophic facet joint changes. Mild to   moderate narrowing of the spinal canal is seen which is more prominent on   the left side. Moderate to severe narrowing of the left neural foramen is   seen.    C5-6: Disc bulge is seen which is more prominent on left side. Minimal   effacement of the ventral thecal sac is seen on left side. Severe   narrowing of the left neural foramen is seen.    C6-7: Disc bulge is seen. No significant commas of the spinal canal or   either neural foramen    C7-T1: Normal    The spinal cord demonstrates normal signal and caliber.    Evaluation of the paraspinal soft tissues appear normal.    IMPRESSION: Degenerative changes again seen as described above.    --- End of Report ---    < end of copied text >

## 2022-07-24 NOTE — PROGRESS NOTE ADULT - ASSESSMENT
This is a 71 M with PMHx of stroke, partial seizures (follows with Dr. Bullock), ADAIR, DM, DLD, BPH, gait disorder (mainly mechanical), presented to the ED for AMS and dizziness that began last night around 23:00. Per patient and son at bedside, patient was sitting on the sofa last night watching television when he suddenly began to feel strange.  On ED arrival, CTH showed new focal hypodensities involving the left cerebellar hemisphere as well as the left periatrial white matter consistent with age indeterminate lacunar infarcts. Per son, patient's memory and cognition have been gradually declining over the last 2 months.   CTH: new focal hypodensity involving the left cerebellar hemisphere as well as the left periatrial white matter consistent with age indeterminate lacunar infarcts.  MR brain non con reviewed, showed no acute infarct but did show chronic infarcts  MR C-spine non con reviewed, degenerative changes seen    Plan:  Neuro  - F/u Echo  - Continue ASA 81mg qd  - Continue Plavix 75mg qd  - Continue home Lipitor 40mg qd  - LDL: 52, A1c: 6.6   - Q8 neuro checks  - Can follow up outpatient for Neuropsych eval for possible underlying dementia    #Seizure Disorder  - C/w home Keppra, increased to 1000 mg BID  - C/w home Oxcarbazepine 300mg BID    Cardio  #HTN  - Continue home enalapril 20mg daily   - Monitor vitals     #Dyslipidemia  - Atorvastatin 40mg daily as above    Psych  #Depression  - Continue buspirone 10 mg and citalopram 20mg daily     Endo  #DM  - ISS  - Fingersticks before meals and at bedtime    BPH  - C/w home Oxybutynin 15mg daily      Misc  - Diet: Carb Consistent  - Activity: IAT  - DVT Prophylaxis: Lovenox 40mg daily  - GI Prophylaxis: None  - Code Status: full code  - Disposition: home

## 2022-07-25 LAB
GLUCOSE BLDC GLUCOMTR-MCNC: 133 MG/DL — HIGH (ref 70–99)
GLUCOSE BLDC GLUCOMTR-MCNC: 134 MG/DL — HIGH (ref 70–99)
GLUCOSE BLDC GLUCOMTR-MCNC: 143 MG/DL — HIGH (ref 70–99)
GLUCOSE BLDC GLUCOMTR-MCNC: 184 MG/DL — HIGH (ref 70–99)
SARS-COV-2 RNA SPEC QL NAA+PROBE: SIGNIFICANT CHANGE UP

## 2022-07-25 PROCEDURE — 99232 SBSQ HOSP IP/OBS MODERATE 35: CPT

## 2022-07-25 RX ADMIN — Medication 10 MILLIGRAM(S): at 05:23

## 2022-07-25 RX ADMIN — HEPARIN SODIUM 5000 UNIT(S): 5000 INJECTION INTRAVENOUS; SUBCUTANEOUS at 14:13

## 2022-07-25 RX ADMIN — HEPARIN SODIUM 5000 UNIT(S): 5000 INJECTION INTRAVENOUS; SUBCUTANEOUS at 22:18

## 2022-07-25 RX ADMIN — ATORVASTATIN CALCIUM 40 MILLIGRAM(S): 80 TABLET, FILM COATED ORAL at 22:18

## 2022-07-25 RX ADMIN — Medication 20 MILLIGRAM(S): at 17:10

## 2022-07-25 RX ADMIN — HEPARIN SODIUM 5000 UNIT(S): 5000 INJECTION INTRAVENOUS; SUBCUTANEOUS at 05:24

## 2022-07-25 RX ADMIN — OXCARBAZEPINE 300 MILLIGRAM(S): 300 TABLET, FILM COATED ORAL at 05:22

## 2022-07-25 RX ADMIN — Medication 81 MILLIGRAM(S): at 11:20

## 2022-07-25 RX ADMIN — OXCARBAZEPINE 300 MILLIGRAM(S): 300 TABLET, FILM COATED ORAL at 17:11

## 2022-07-25 RX ADMIN — CITALOPRAM 20 MILLIGRAM(S): 10 TABLET, FILM COATED ORAL at 11:20

## 2022-07-25 RX ADMIN — Medication 10 MILLIGRAM(S): at 17:10

## 2022-07-25 RX ADMIN — Medication 20 MILLIGRAM(S): at 05:23

## 2022-07-25 RX ADMIN — LEVETIRACETAM 1000 MILLIGRAM(S): 250 TABLET, FILM COATED ORAL at 05:22

## 2022-07-25 RX ADMIN — CLOPIDOGREL BISULFATE 75 MILLIGRAM(S): 75 TABLET, FILM COATED ORAL at 11:20

## 2022-07-25 RX ADMIN — Medication 1: at 11:19

## 2022-07-25 RX ADMIN — LEVETIRACETAM 1000 MILLIGRAM(S): 250 TABLET, FILM COATED ORAL at 17:11

## 2022-07-25 RX ADMIN — TAMSULOSIN HYDROCHLORIDE 0.4 MILLIGRAM(S): 0.4 CAPSULE ORAL at 22:18

## 2022-07-25 NOTE — PROGRESS NOTE ADULT - ATTENDING COMMENTS
I have personally seen and examined this patient on 7/24.  I have fully participated in the care of this patient.  I have reviewed all pertinent clinical information, including history, physical exam, plan and note. On exam today I noted mild parkinsonism with bilateral rigidity, reduced arm swing, glabellar sign, bradykinesia, reduced arm swing and short steps suspected for Parkinson disease, parkinson's plus or neurodegenerative disease.  Could start low dose Sinemet 0.5 tablet TID. Will coordinate discharge tomorrow.  I have reviewed all pertinent clinical information and reviewed all relevant imaging and diagnostic studies personally.  Recommendations as above.  Agree with above assessment except as noted.
Patient seen and examined and agree with above except as noted.  Patients history, notes, labs, imaging, vitals and meds reviewed personally.  NO new complaints and feels he is getting better still  Awaiting MRI brain and cspine    Plan as above
Patient seen and examined and agree with above except as noted.  Patients history, notes, labs, imaging, vitals and meds reviewed personally.  Patient feeling better today  Legs less stiff and able to move better  Still has increase in reflexes more on the right    Plan as above
Pt is a 73 yo M with PMHx of prior stroke, seizures, ADAIR, DM, HLD, frequent falls, who presented with dizziness. Also noted to have a subacute decline in cognition/memory per pt's son x 2 mo, as well as visual hallucinations.     Impr: punctate acute ischemic stroke in right frontal subcortical region.  Also note moderate , multiple microhemorrhages mostly located in BG/cerebellum, likely 2/2  rather than CAA.  There was question of parkinsonism on presentation with some rigidity noted, however improved even prior to initiating sinemet. Givent hat there was some concern for visual hallucination, without evidence of bradykinesia or rigidity on exam today, will d/c sinemet as this may exacerbate the hallucinations.  outpatient w/u for parkinsons-spectrum disorder (lewy body vs PD, etc).   PTA: plavix-> DAPT  PT/OT/ST, tele, TTE pending
I have personally seen and examined this patient.  I have fully participated in the care of this patient.  I have reviewed all pertinent clinical information, including history, physical exam, plan and note. Brain MRI reviewed. There is an area of DWI punctate increase signal with no ADC correlation. No acute infarct in cerebellum. Less likely acute infarct. MRI cervical spine showed  cord signal changes. REEG was normal. Possible seizure versus vertigo event. Unclear why patient was spastic but now is resolved. I spoke with family in details. Family not comfortable on discharge. Will keep the patient until Monday for rehab assessment. Also will ask Dr Bullock to assess the patient.  I have reviewed all pertinent clinical information and reviewed all relevant imaging and diagnostic studies personally.  Recommendations as above.  Agree with above assessment except as noted.

## 2022-07-25 NOTE — PROGRESS NOTE ADULT - ASSESSMENT
71M with PMH of stroke, partial seizures (follows with Dr. Bullock), ADAIR, DM, DLD, BPH, gait disorder (mainly mechanical), presented to the ED for AMS and dizziness that began last night around 23:00. Per patient and son at bedside, patient was sitting on the sofa last night watching television when he suddenly began to feel strange.  On ED arrival, CTH showed new focal hypodensities involving the left cerebellar hemisphere as well as the left periatrial white matter consistent with age indeterminate lacunar infarcts. Per son, patient's memory and cognition have been gradually declining over the last 2 months.     #AMS/Dizziness/Chronic strokes R/o Parkinsonian Syndrome  - CTH: new focal hypodensities involving the left cerebellar hemisphere as well as the left periatrial white matter consistent with age indeterminate lacunar infarcts.  - f/u MR brain non-cont; no acute contrast  - Echo pending  - ASA 81mg qd  - Plavix 75mg qd  - C/w home Lipitor 40mg qd  - LDL:  52 ,  A1c: 6.6   - Q4 neuro checks  -check TSH, B12, RPR, ammonia    Seizure Disorder  - C/w home Keppra 750mg BID  - C/w home Oxcarbazepine 300mg BID  -  REEG : diffuse slowing    HTN  - CW home enalapril 20mg daily   - monitor vitals     Depression  - cw buspirone 10mg and citalopram 20mg daily      DM  - ISS  - Fingersticks before meals and at bedtime    BPH  - C/w home Oxybutynin 15mg daily    Dyslipidemia  - Atorvastatin 40mg daily as above    Misc  - Diet: Carb Consistent  - Activity: IAT  - DVT Prophylaxis: Lovenox 40mg daily, SCDs  - GI Prophylaxis: none  - Code Status: full code  - Disposition: acute    #Progress Note Handoff  Pending: Consults____Clinical improvement and stability__x___Tests__TSH, B12, TTE______PT____x____  Pt/Family discussion: Pt/son informed and agree with the current plan  Disposition: Home__?____/SNF_______/4A______/To be determined____x____    My note supersedes the residents note should a discrepancy arise.    Chart and notes personally reviewed.  Care Discussed with Consultants/Other Providers/ Housestaff [ x] YES [ ] NO   Radiology, labs, old records personally reviewed.    discussed w/ housestaff, nursing, case management, neuro team   71M with PMH of stroke, partial seizures (follows with Dr. Bullock), ADAIR, DM, DLD, BPH, gait disorder (mainly mechanical), presented to the ED for AMS and dizziness that began last night around 23:00. Per patient and son at bedside, patient was sitting on the sofa last night watching television when he suddenly began to feel strange.  On ED arrival, CTH showed new focal hypodensities involving the left cerebellar hemisphere as well as the left periatrial white matter consistent with age indeterminate lacunar infarcts. Per son, patient's memory and cognition have been gradually declining over the last 2 months.     #AMS/Dizziness/Chronic strokes / R/o Parkinsons dz  - CTH: new focal hypodensities involving the left cerebellar hemisphere as well as the left periatrial white matter consistent with age indeterminate lacunar infarcts.  - MR brain non-cont; no acute contrast  - Echo pending  - ASA 81mg qd  - Plavix 75mg qd  - C/w home Lipitor 40mg qd  - LDL:  52 ,  A1c: 6.6   - Q4 neuro checks  -check TSH, B12, RPR, ammonia  -Sinemet as per neuro    Seizure Disorder  - C/w home Keppra 750mg BID  - C/w home Oxcarbazepine 300mg BID  -  REEG : diffuse slowing    HTN  - CW home enalapril 20mg daily   - monitor vitals     Depression  - cw buspirone 10mg and citalopram 20mg daily      DM  - ISS  - Fingersticks before meals and at bedtime    BPH  - C/w home Oxybutynin 15mg daily    Dyslipidemia  - Atorvastatin 40mg daily as above    Misc  - Diet: Carb Consistent  - Activity: IAT  - DVT Prophylaxis: Lovenox 40mg daily, SCDs  - GI Prophylaxis: none  - Code Status: full code  - Disposition: acute    #Progress Note Handoff  Pending: Consults____Clinical improvement and stability__x___Tests__TSH, B12, TTE______PT____x____  Pt/Family discussion: Pt/son informed and agree with the current plan  Disposition: Home__?____/SNF_______/4A______/To be determined____x____    My note supersedes the residents note should a discrepancy arise.    Chart and notes personally reviewed.  Care Discussed with Consultants/Other Providers/ Housestaff [ x] YES [ ] NO   Radiology, labs, old records personally reviewed.    discussed w/ housestaff, nursing, case management, neuro team   71M with PMH of stroke, partial seizures (follows with Dr. Bullock), ADAIR, DM, DLD, BPH, gait disorder (mainly mechanical), presented to the ED for AMS and dizziness that began last night around 23:00. Per patient and son at bedside, patient was sitting on the sofa last night watching television when he suddenly began to feel strange.  On ED arrival, CTH showed new focal hypodensities involving the left cerebellar hemisphere as well as the left periatrial white matter consistent with age indeterminate lacunar infarcts. Per son, patient's memory and cognition have been gradually declining over the last 2 months.     #AMS/Dizziness/Chronic strokes / R/o Parkinsons dz  - CTH: new focal hypodensities involving the left cerebellar hemisphere as well as the left periatrial white matter consistent with age indeterminate lacunar infarcts.  - MR brain non-cont; no acute contrast  - Echo pending  - ASA 81mg qd  - Plavix 75mg qd  - C/w home Lipitor 40mg qd  - LDL:  52 ,  A1c: 6.6   - Q4 neuro checks  -check TSH, B12, RPR, ammonia  -Sinemet as per neuro    Seizure Disorder  - C/w home Keppra 750mg BID  - C/w home Oxcarbazepine 300mg BID  -  REEG : diffuse slowing    HTN  - CW home enalapril 20mg BID   -resume Metoprolol  - monitor vitals     Depression  - cw buspirone 10mg and citalopram 20mg daily      DM  - ISS  - Fingersticks before meals and at bedtime    BPH  - C/w home Oxybutynin 15mg daily    Dyslipidemia  - Atorvastatin 40mg daily as above    Misc  - Diet: Carb Consistent  - Activity: IAT  - DVT Prophylaxis: Lovenox 40mg daily, SCDs  - GI Prophylaxis: none  - Code Status: full code  - Disposition: acute    #Progress Note Handoff  Pending: Consults____Clinical improvement and stability__x___Tests__TSH, B12, TTE______PT____x____  Pt/Family discussion: Pt/son informed and agree with the current plan  Disposition: Home__?____/SNF_______/4A______/To be determined____x____    My note supersedes the residents note should a discrepancy arise.    Chart and notes personally reviewed.  Care Discussed with Consultants/Other Providers/ Housestaff [ x] YES [ ] NO   Radiology, labs, old records personally reviewed.    discussed w/ housestaff, nursing, case management, neuro team

## 2022-07-25 NOTE — PROGRESS NOTE ADULT - SUBJECTIVE AND OBJECTIVE BOX
ANN BLANCO  72y  Male    Patient is a 72y old  Male who presents with a chief complaint of Stroke workup (21 Jul 2022 14:13)      HPI:  Patient is a 71M with PMH of CVA, partial seizures (follows with Dr. Bullock), ADAIR, DM, DLD, BPH, gait disorder (mainly mechanical), presented to the ED for AMS and dizziness that began last night around 23:00. Per patient and son at bedside, patient was sitting on the sofa last night watching television when he suddenly began to feel strange. Son mentions that patient started hallucinating seeing cartoon characters in the air. Patient subsequently attempted to stand up to go to the bathroom but immediately required assistance from his son as he felt dizzy and had a sensation of the room spinning. He went to bed but woke up several times throughout the night, still confused and still dizzy. The symptoms were still present this morning, prompting a visit to the ED. On ED arrival, CTH showed new focal hypodensities involving the left cerebellar hemisphere as well as the left periatrial white matter consistent with age indeterminate lacunar infarcts. Per son, patient's memory and cognition have been gradually declining over the last 2 months. He often forgets family members, forgets where he is, and at times talks nonsense. Son endorses that patient has been taking all of his medications, including AEDs (Keppra 750mg BID, Oxcarbazepine 300mg BID), as prescribed.  (20 Jul 2022 16:35)    S: Patient was examined and seen at bedside. This morning pt is resting comfortably in bed and reports no new issues or overnight events. No complaints, feels well. Started on Sinemet.  Denies CP, SOB, N/V/D/C/AP, cough, F, chills, dizziness, new focal weakness, HA, vision changes, dysuria, or urinary symptoms, blood in stool.  ROS: all other systems reviewed and are negative    PAST MEDICAL & SURGICAL HISTORY:  Nonintractable epilepsy without status epilepticus, unspecified epilepsy type      Neuropathy      Hypertension, unspecified type      Type 2 diabetes mellitus with complication, without long-term current use of insulin      Anxiety      Cerebrovascular accident (CVA), unspecified mechanism      History of surgery  BROKEN LEG SURGERY      Capsular cataract of right eye        SOCIAL HISTORY:  Tobacco: denies  Illicit drugs: denies  Alcohol: social  Family history reviewed and otherwise non-contributory  ALLERGIES: NKDA    General: NAD. Looks stated age.  HEENT: clean oropharynx, EOMI, no LAD  Neck: trachea midline, no thyromegaly  CV: nl S1 S2; no m/r/g  Resp: decreased breath sounds at base  GI: NT/ND/S +BS  MS: no clubbing/cyanosis/edema, +pulses  Neuro: motor, sensory intact; + muscle rigidity all extremities  Skin: no rashes, nl turgor  Psychiatric: AA0x3 w/ good insight and judgement    tele: SR, nonspecific changes (on my own evaluation of tele monitor)              MEDICATIONS  (STANDING):  aspirin  chewable 81 milliGRAM(s) Enteral Tube daily  atorvastatin 40 milliGRAM(s) Oral at bedtime  busPIRone 10 milliGRAM(s) Oral two times a day  citalopram 20 milliGRAM(s) Oral daily  clopidogrel Tablet 75 milliGRAM(s) Oral daily  dextrose 5%. 1000 milliLiter(s) (100 mL/Hr) IV Continuous <Continuous>  dextrose 5%. 1000 milliLiter(s) (50 mL/Hr) IV Continuous <Continuous>  dextrose 50% Injectable 25 Gram(s) IV Push once  dextrose 50% Injectable 12.5 Gram(s) IV Push once  dextrose 50% Injectable 25 Gram(s) IV Push once  enalapril 20 milliGRAM(s) Oral two times a day  glucagon  Injectable 1 milliGRAM(s) IntraMuscular once  heparin   Injectable 5000 Unit(s) SubCutaneous every 8 hours  insulin lispro (ADMELOG) corrective regimen sliding scale   SubCutaneous three times a day before meals  levETIRAcetam 1000 milliGRAM(s) Oral two times a day  OXcarbazepine 300 milliGRAM(s) Oral two times a day  tamsulosin 0.4 milliGRAM(s) Oral at bedtime    MEDICATIONS  (PRN):  dextrose Oral Gel 15 Gram(s) Oral once PRN Blood Glucose LESS THAN 70 milliGRAM(s)/deciliter    Home Medications:  busPIRone 15 mg oral tablet: 1 tab(s) orally once a day (20 Jul 2022 16:56)  citalopram 20 mg oral tablet: 1 tab(s) orally once a day (20 Jul 2022 16:56)  enalapril 20 mg oral tablet: 1 tab(s) orally 2 times a day (20 Jul 2022 16:56)  Janumet  mg-1000 mg oral tablet, extended release: 1 tab(s) orally once a day (in the evening) (20 Jul 2022 16:56)  Keppra 750 mg oral tablet: 1 tab(s) orally 2 times a day (20 Jul 2022 16:56)  Lipitor 40 mg oral tablet: 1 tab(s) orally once a day (20 Jul 2022 16:56)  metoprolol succinate 50 mg oral tablet, extended release: 1 tab(s) orally once a day (20 Jul 2022 16:56)  OXcarbazepine 300 mg oral tablet: 1 tab(s) orally 2 times a day (20 Jul 2022 16:56)  oxybutynin 15 mg/24 hr oral tablet, extended release: 1 tab(s) orally once a day (20 Jul 2022 16:56)  Plavix 75 mg oral tablet: 1 tab(s) orally once a day (20 Jul 2022 16:56)    Vital Signs Last 24 Hrs  T(C): 35.6 (25 Jul 2022 04:19), Max: 36.6 (24 Jul 2022 13:22)  T(F): 96.1 (25 Jul 2022 04:19), Max: 97.8 (24 Jul 2022 13:22)  HR: 76 (25 Jul 2022 04:19) (71 - 76)  BP: 179/89 (25 Jul 2022 04:19) (158/75 - 179/89)  BP(mean): 124 (25 Jul 2022 04:19) (110 - 124)  RR: 18 (25 Jul 2022 04:19) (18 - 18)  SpO2: --      CAPILLARY BLOOD GLUCOSE      POCT Blood Glucose.: 133 mg/dL (25 Jul 2022 07:23)  POCT Blood Glucose.: 108 mg/dL (24 Jul 2022 21:00)  POCT Blood Glucose.: 154 mg/dL (24 Jul 2022 16:59)  POCT Blood Glucose.: 243 mg/dL (24 Jul 2022 11:17)    LABS:                            Consultant Notes Reviewed:  [x ] YES  [ ] NO  Care Discussed with Consultants/Other Providers/ Housestaff [ x] YES  [ ] NO  Radiology, labs, new studies personally reviewed.

## 2022-07-25 NOTE — PROGRESS NOTE ADULT - SUBJECTIVE AND OBJECTIVE BOX
Neurology Stroke Progress Note    INTERVAL HPI/OVERNIGHT EVENTS:  Patient seen and examined. He was sitting comfortably in the chair and did not appear in acute distress.     MEDICATIONS  (STANDING):  aspirin  chewable 81 milliGRAM(s) Enteral Tube daily  atorvastatin 40 milliGRAM(s) Oral at bedtime  busPIRone 10 milliGRAM(s) Oral two times a day  citalopram 20 milliGRAM(s) Oral daily  clopidogrel Tablet 75 milliGRAM(s) Oral daily  dextrose 5%. 1000 milliLiter(s) (50 mL/Hr) IV Continuous <Continuous>  dextrose 5%. 1000 milliLiter(s) (100 mL/Hr) IV Continuous <Continuous>  dextrose 50% Injectable 25 Gram(s) IV Push once  dextrose 50% Injectable 12.5 Gram(s) IV Push once  dextrose 50% Injectable 25 Gram(s) IV Push once  enalapril 20 milliGRAM(s) Oral two times a day  glucagon  Injectable 1 milliGRAM(s) IntraMuscular once  heparin   Injectable 5000 Unit(s) SubCutaneous every 8 hours  insulin lispro (ADMELOG) corrective regimen sliding scale   SubCutaneous three times a day before meals  levETIRAcetam 1000 milliGRAM(s) Oral two times a day  OXcarbazepine 300 milliGRAM(s) Oral two times a day  tamsulosin 0.4 milliGRAM(s) Oral at bedtime    MEDICATIONS  (PRN):  dextrose Oral Gel 15 Gram(s) Oral once PRN Blood Glucose LESS THAN 70 milliGRAM(s)/deciliter    Allergies    No Known Allergies    Intolerances      Vital Signs Last 24 Hrs  T(C): 36.4 (25 Jul 2022 13:15), Max: 36.4 (25 Jul 2022 13:15)  T(F): 97.6 (25 Jul 2022 13:15), Max: 97.6 (25 Jul 2022 13:15)  HR: 84 (25 Jul 2022 13:15) (73 - 84)  BP: 150/80 (25 Jul 2022 13:15) (150/80 - 179/89)  BP(mean): 124 (25 Jul 2022 04:19) (110 - 124)  RR: 19 (25 Jul 2022 13:15) (18 - 19)  SpO2: --        Physical exam:  General: No acute distress, awake and alert  Eyes: Anicteric sclerae, moist conjunctivae, see below for CNs  Neck: trachea midline, FROM, supple, no thyromegaly or lymphadenopathy  Cardiovascular: Regular rate and rhythm, no murmurs, rubs, or gallops. No carotid bruits.   Pulmonary: Anterior breath sounds clear bilaterally, no crackles or wheezing. No use of accessory muscles  GI: Abdomen soft, non-distended, non-tender  Extremities: Radial and DP pulses +2, no edema    Neurologic:  -Mental status: Awake, alert, oriented to person, place, and time. Speech is fluent with intact naming, repetition, and comprehension, no dysarthria. Recent and remote memory intact. Follows commands. Attention/concentration intact. Fund of knowledge appropriate.  -Cranial nerves:   II: Visual fields are full to confrontation.  III, IV, VI: Extraocular movements are intact without nystagmus. Pupils equally round and reactive to light  V:  Facial sensation V1-V3 equal and intact   VII: Face is symmetric with normal eye closure and smile  VIII: Hearing is bilaterally intact to finger rub  IX, X: Uvula is midline and soft palate rises symmetrically  XI: Head turning and shoulder shrug are intact.  XII: Tongue protrudes midline  Motor: Normal bulk and tone. No pronator drift. Strength bilateral upper extremity 5/5, bilateral lower extremities 5/5.  Rapid alternating movements intact and symmetric  Sensation: Intact to light touch bilaterally. No neglect or extinction on double simultaneous testing.  Coordination: No dysmetria on finger-to-nose and heel-to-shin bilaterally      LABS:                RADIOLOGY & ADDITIONAL TESTS:

## 2022-07-25 NOTE — PROGRESS NOTE ADULT - ASSESSMENT
71 M with PMHx of stroke, partial seizures (follows with Dr. Bullock), ADAIR, DM, DLD, BPH, gait disorder (mainly mechanical), presented to the ED for AMS and dizziness that began last night around 23:00. Per patient and son at bedside, patient was sitting on the sofa last night watching television when he suddenly began to feel strange.  On ED arrival, CTH showed new focal hypodensities involving the left cerebellar hemisphere as well as the left periatrial white matter consistent with age indeterminate lacunar infarcts. Per son, patient's memory and cognition have been gradually declining over the last 2 months.     Plan:  Neuro  - CTH: new focal hypodensity involving the left cerebellar hemisphere as well as the left periatrial white matter consistent with age indeterminate lacunar infarcts.  - MR brain non con reviewed, showed no acute infarct but did show chronic infarcts  - MR C-spine non con reviewed, degenerative changes seen  - F/u Echo, serum TSH, B12, RPR and ammonia level   - Continue ASA 81mg qd  - Continue Plavix 75mg qd  - Continue home Lipitor 40mg qd  - LDL: 52, A1c: 6.6   - Q8 neuro checks  - Can follow up outpatient for Neuropsych eval and movement ds clinic   - f/u PT recommendations for home vs rehab     #Seizure Disorder  - C/w home Keppra, increased to 1000 mg BID  - C/w home Oxcarbazepine 300mg BID    Cardio  #HTN  - Continue home enalapril 20mg daily   - Monitor vitals     #Dyslipidemia  - Atorvastatin 40mg daily as above    Psych  #Depression  - Continue buspirone 10 mg and citalopram 20mg daily     Endo  #DM  - ISS  - Fingersticks before meals and at bedtime    BPH  - C/w home Oxybutynin 15mg daily      Misc  - Diet: Carb Consistent  - Activity: IAT  - DVT Prophylaxis: Lovenox 40mg daily  - GI Prophylaxis: None  - Code Status: full code  - Disposition: home

## 2022-07-25 NOTE — SPEECH LANGUAGE PATHOLOGY EVALUATION - SLP DIAGNOSIS
Receptive, expressive language and speech are WFL. Pt. presents with a mild-cognitive linguistic deficit in the area of STM.

## 2022-07-25 NOTE — SPEECH LANGUAGE PATHOLOGY EVALUATION - COMMENTS
Pt. received awake, alert, ox4, resolved dysarthria, room air. Pt. reports b/l STM deficits. WFL Expressive language skills are WFL for functional communication. Mild STM deficits. Pt. benefits from min/mod written cues. Receptive language skills are WFL.

## 2022-07-26 LAB
AMMONIA BLD-MCNC: 33 UMOL/L — SIGNIFICANT CHANGE UP (ref 11–55)
ANION GAP SERPL CALC-SCNC: 13 MMOL/L — SIGNIFICANT CHANGE UP (ref 7–14)
BUN SERPL-MCNC: 15 MG/DL — SIGNIFICANT CHANGE UP (ref 10–20)
CALCIUM SERPL-MCNC: 9.2 MG/DL — SIGNIFICANT CHANGE UP (ref 8.5–10.1)
CHLORIDE SERPL-SCNC: 102 MMOL/L — SIGNIFICANT CHANGE UP (ref 98–110)
CO2 SERPL-SCNC: 23 MMOL/L — SIGNIFICANT CHANGE UP (ref 17–32)
CREAT SERPL-MCNC: 0.8 MG/DL — SIGNIFICANT CHANGE UP (ref 0.7–1.5)
EGFR: 95 ML/MIN/1.73M2 — SIGNIFICANT CHANGE UP
GLUCOSE BLDC GLUCOMTR-MCNC: 139 MG/DL — HIGH (ref 70–99)
GLUCOSE BLDC GLUCOMTR-MCNC: 143 MG/DL — HIGH (ref 70–99)
GLUCOSE BLDC GLUCOMTR-MCNC: 149 MG/DL — HIGH (ref 70–99)
GLUCOSE BLDC GLUCOMTR-MCNC: 202 MG/DL — HIGH (ref 70–99)
GLUCOSE SERPL-MCNC: 142 MG/DL — HIGH (ref 70–99)
HCT VFR BLD CALC: 44.4 % — SIGNIFICANT CHANGE UP (ref 42–52)
HGB BLD-MCNC: 14.8 G/DL — SIGNIFICANT CHANGE UP (ref 14–18)
MAGNESIUM SERPL-MCNC: 1.8 MG/DL — SIGNIFICANT CHANGE UP (ref 1.8–2.4)
MCHC RBC-ENTMCNC: 31.1 PG — HIGH (ref 27–31)
MCHC RBC-ENTMCNC: 33.3 G/DL — SIGNIFICANT CHANGE UP (ref 32–37)
MCV RBC AUTO: 93.3 FL — SIGNIFICANT CHANGE UP (ref 80–94)
NRBC # BLD: 0 /100 WBCS — SIGNIFICANT CHANGE UP (ref 0–0)
PHOSPHATE SERPL-MCNC: 4 MG/DL — SIGNIFICANT CHANGE UP (ref 2.1–4.9)
PLATELET # BLD AUTO: 172 K/UL — SIGNIFICANT CHANGE UP (ref 130–400)
POTASSIUM SERPL-MCNC: 4 MMOL/L — SIGNIFICANT CHANGE UP (ref 3.5–5)
POTASSIUM SERPL-SCNC: 4 MMOL/L — SIGNIFICANT CHANGE UP (ref 3.5–5)
RBC # BLD: 4.76 M/UL — SIGNIFICANT CHANGE UP (ref 4.7–6.1)
RBC # FLD: 13.1 % — SIGNIFICANT CHANGE UP (ref 11.5–14.5)
SODIUM SERPL-SCNC: 138 MMOL/L — SIGNIFICANT CHANGE UP (ref 135–146)
T PALLIDUM AB TITR SER: NEGATIVE — SIGNIFICANT CHANGE UP
TSH SERPL-MCNC: 1.12 UIU/ML — SIGNIFICANT CHANGE UP (ref 0.27–4.2)
VIT B12 SERPL-MCNC: 236 PG/ML — SIGNIFICANT CHANGE UP (ref 232–1245)
WBC # BLD: 6.53 K/UL — SIGNIFICANT CHANGE UP (ref 4.8–10.8)
WBC # FLD AUTO: 6.53 K/UL — SIGNIFICANT CHANGE UP (ref 4.8–10.8)

## 2022-07-26 PROCEDURE — 99232 SBSQ HOSP IP/OBS MODERATE 35: CPT

## 2022-07-26 PROCEDURE — 93306 TTE W/DOPPLER COMPLETE: CPT | Mod: 26

## 2022-07-26 PROCEDURE — 99233 SBSQ HOSP IP/OBS HIGH 50: CPT

## 2022-07-26 RX ORDER — AMLODIPINE BESYLATE 2.5 MG/1
5 TABLET ORAL DAILY
Refills: 0 | Status: DISCONTINUED | OUTPATIENT
Start: 2022-07-26 | End: 2022-07-27

## 2022-07-26 RX ORDER — METOPROLOL TARTRATE 50 MG
50 TABLET ORAL DAILY
Refills: 0 | Status: DISCONTINUED | OUTPATIENT
Start: 2022-07-26 | End: 2022-07-27

## 2022-07-26 RX ADMIN — Medication 2: at 11:23

## 2022-07-26 RX ADMIN — Medication 20 MILLIGRAM(S): at 05:59

## 2022-07-26 RX ADMIN — ATORVASTATIN CALCIUM 40 MILLIGRAM(S): 80 TABLET, FILM COATED ORAL at 21:22

## 2022-07-26 RX ADMIN — TAMSULOSIN HYDROCHLORIDE 0.4 MILLIGRAM(S): 0.4 CAPSULE ORAL at 21:22

## 2022-07-26 RX ADMIN — Medication 50 MILLIGRAM(S): at 14:37

## 2022-07-26 RX ADMIN — LEVETIRACETAM 1000 MILLIGRAM(S): 250 TABLET, FILM COATED ORAL at 17:32

## 2022-07-26 RX ADMIN — OXCARBAZEPINE 300 MILLIGRAM(S): 300 TABLET, FILM COATED ORAL at 17:32

## 2022-07-26 RX ADMIN — OXCARBAZEPINE 300 MILLIGRAM(S): 300 TABLET, FILM COATED ORAL at 05:59

## 2022-07-26 RX ADMIN — Medication 81 MILLIGRAM(S): at 11:30

## 2022-07-26 RX ADMIN — HEPARIN SODIUM 5000 UNIT(S): 5000 INJECTION INTRAVENOUS; SUBCUTANEOUS at 05:59

## 2022-07-26 RX ADMIN — Medication 10 MILLIGRAM(S): at 17:33

## 2022-07-26 RX ADMIN — Medication 10 MILLIGRAM(S): at 06:00

## 2022-07-26 RX ADMIN — CITALOPRAM 20 MILLIGRAM(S): 10 TABLET, FILM COATED ORAL at 11:31

## 2022-07-26 RX ADMIN — LEVETIRACETAM 1000 MILLIGRAM(S): 250 TABLET, FILM COATED ORAL at 06:00

## 2022-07-26 RX ADMIN — AMLODIPINE BESYLATE 5 MILLIGRAM(S): 2.5 TABLET ORAL at 14:37

## 2022-07-26 RX ADMIN — Medication 20 MILLIGRAM(S): at 17:32

## 2022-07-26 RX ADMIN — HEPARIN SODIUM 5000 UNIT(S): 5000 INJECTION INTRAVENOUS; SUBCUTANEOUS at 21:22

## 2022-07-26 RX ADMIN — CLOPIDOGREL BISULFATE 75 MILLIGRAM(S): 75 TABLET, FILM COATED ORAL at 11:29

## 2022-07-26 RX ADMIN — HEPARIN SODIUM 5000 UNIT(S): 5000 INJECTION INTRAVENOUS; SUBCUTANEOUS at 13:32

## 2022-07-26 NOTE — PROGRESS NOTE ADULT - SUBJECTIVE AND OBJECTIVE BOX
Neurology Stroke Progress Note    INTERVAL HPI/OVERNIGHT EVENTS:  Patient seen and examined. He was resting comfortably and did not appear in acute distress.     MEDICATIONS  (STANDING):  aspirin  chewable 81 milliGRAM(s) Enteral Tube daily  atorvastatin 40 milliGRAM(s) Oral at bedtime  busPIRone 10 milliGRAM(s) Oral two times a day  citalopram 20 milliGRAM(s) Oral daily  clopidogrel Tablet 75 milliGRAM(s) Oral daily  dextrose 5%. 1000 milliLiter(s) (50 mL/Hr) IV Continuous <Continuous>  dextrose 5%. 1000 milliLiter(s) (100 mL/Hr) IV Continuous <Continuous>  dextrose 50% Injectable 25 Gram(s) IV Push once  dextrose 50% Injectable 12.5 Gram(s) IV Push once  dextrose 50% Injectable 25 Gram(s) IV Push once  enalapril 20 milliGRAM(s) Oral two times a day  glucagon  Injectable 1 milliGRAM(s) IntraMuscular once  heparin   Injectable 5000 Unit(s) SubCutaneous every 8 hours  insulin lispro (ADMELOG) corrective regimen sliding scale   SubCutaneous three times a day before meals  levETIRAcetam 1000 milliGRAM(s) Oral two times a day  OXcarbazepine 300 milliGRAM(s) Oral two times a day  tamsulosin 0.4 milliGRAM(s) Oral at bedtime    MEDICATIONS  (PRN):  dextrose Oral Gel 15 Gram(s) Oral once PRN Blood Glucose LESS THAN 70 milliGRAM(s)/deciliter    Allergies    No Known Allergies    Intolerances      Vital Signs Last 24 Hrs  T(C): 35.6 (26 Jul 2022 05:16), Max: 36.6 (25 Jul 2022 20:05)  T(F): 96.1 (26 Jul 2022 05:16), Max: 97.9 (25 Jul 2022 20:05)  HR: 84 (26 Jul 2022 05:16) (80 - 84)  BP: 146/66 (26 Jul 2022 05:16) (146/66 - 162/76)  BP(mean): 95 (26 Jul 2022 05:16) (95 - 95)  RR: 18 (26 Jul 2022 05:16) (18 - 19)  SpO2: --        Physical exam:  General: No acute distress, awake and alert  Eyes: Anicteric sclerae, moist conjunctivae, see below for CNs  Neck: trachea midline, FROM, supple, no thyromegaly or lymphadenopathy  Cardiovascular: Regular rate and rhythm, no murmurs, rubs, or gallops. No carotid bruits.   Pulmonary: Anterior breath sounds clear bilaterally, no crackles or wheezing. No use of accessory muscles  GI: Abdomen soft, non-distended, non-tender  Extremities: Radial and DP pulses +2, no edema    Neurologic:  -Mental status: Awake, alert, oriented to person, place, and time. Speech is fluent with intact naming, repetition, and comprehension, no dysarthria. Recent and remote memory intact. Follows commands. Attention/concentration intact. Fund of knowledge appropriate.  -Cranial nerves:   II: Visual fields are full to confrontation.  III, IV, VI: Extraocular movements are intact without nystagmus. Pupils equally round and reactive to light  V:  Facial sensation V1-V3 equal and intact   VII: Face is symmetric with normal eye closure and smile  VIII: Hearing is bilaterally intact to finger rub  IX, X: Uvula is midline and soft palate rises symmetrically  XI: Head turning and shoulder shrug are intact.  XII: Tongue protrudes midline  Motor: Normal bulk and tone. No pronator drift. Strength bilateral upper extremity 5/5, bilateral lower extremities 5/5.  Rapid alternating movements intact and symmetric  Sensation: Intact to light touch bilaterally. No neglect or extinction on double simultaneous testing.  Coordination: No dysmetria on finger-to-nose and heel-to-shin bilaterally    LABS:                        14.8   6.53  )-----------( 172      ( 26 Jul 2022 09:27 )             44.4     07-26    138  |  102  |  15  ----------------------------<  142<H>  4.0   |  23  |  0.8    Ca    9.2      26 Jul 2022 09:27  Phos  4.0     07-26  Mg     1.8     07-26            RADIOLOGY & ADDITIONAL TESTS:

## 2022-07-26 NOTE — CDI QUERY NOTE - NSCDIOTHERTXTBX_GEN_ALL_CORE_HH
-------------------------------------------------------------------------------------------------------------------------------------------------------  72 M with Diagnosis:  Acute Cerebellar CVA  Clinical Indicator:  Patient Information:  BMI = 85.4  Documentation:  7/25/2022:  Progress Note:  Medical Attending:   Physical Exam:   · Dosing Weight (KILOGRAMS)	240 kg  · Dosing Weight  (POUNDS)	529.1 Pound(s)  · Height (FEET)	5 Feet  · Height (INCHES)	6 Inch(s)  · Height (CENTIMETERS)	167.64 Centimeter(s)    Management:  Order Summary:  Diet: DASH/TCC: Sodium & Cholesterol Restricted,  Patient Care:  Education- Diabetes, Exercise    Based on your professional judgment and clinical indicators, please clarify if the documented BMI of 85.4 be further specified as:    -----  Morbid Obesity with BMI of 85.4  -----  Obesity with BMI of 85.4  ------ Other (please specify)  ------ Clinically unable to determine     Thank you.

## 2022-07-26 NOTE — PROGRESS NOTE ADULT - ASSESSMENT
71M with PMH of stroke, partial seizures (follows with Dr. Bullock), ADAIR, DM, DLD, BPH, gait disorder (mainly mechanical), presented to the ED for AMS and dizziness that began last night around 23:00. Per patient and son at bedside, patient was sitting on the sofa last night watching television when he suddenly began to feel strange.  On ED arrival, CTH showed new focal hypodensities involving the left cerebellar hemisphere as well as the left periatrial white matter consistent with age indeterminate lacunar infarcts. Per son, patient's memory and cognition have been gradually declining over the last 2 months.     #AMS/Dizziness/Chronic strokes ?Small acute ischemic stroke / R/o Parkinsons dz  - CTH: new focal hypodensities involving the left cerebellar hemisphere as well as the left periatrial white matter consistent with age indeterminate lacunar infarcts.  - MR brain non-cont; possible small acute infarct as per neuro  - Echo pending  - ASA 81mg qd  - Plavix 75mg qd  - C/w home Lipitor 40mg qd  - LDL:  52 ,  A1c: 6.6   -f/u TSH, B12, RPR, ammonia  -Sinemet as per neuro    Seizure Disorder  - C/w home Keppra 750mg BID  - C/w home Oxcarbazepine 300mg BID  -  REEG : diffuse slowing    HTN  - CW home enalapril 20mg BID   -resume Metoprolol  - monitor vitals   -consider adding a low dose Norvasc    Depression  - cw buspirone 10mg and citalopram 20mg daily      DM  - ISS  - Fingersticks before meals and at bedtime    BPH  - C/w home Oxybutynin 15mg daily    Dyslipidemia  - Atorvastatin 40mg daily as above    Misc  - Diet: Carb Consistent  - Activity: IAT  - DVT Prophylaxis: Lovenox 40mg daily, SCDs  - GI Prophylaxis: none  - Code Status: full code    #Progress Note Handoff  Pending: Consults____Clinical improvement and stability__x___Tests__TSH, B12, TTE______PT____x____  Pt/Family discussion: Pt/son informed and agree with the current plan  Disposition: SNF_    My note supersedes the residents note should a discrepancy arise.    Chart and notes personally reviewed.  Care Discussed with Consultants/Other Providers/ Housestaff [ x] YES [ ] NO   Radiology, labs, old records personally reviewed.    discussed w/ housestaff, nursing, case management, neuro team

## 2022-07-26 NOTE — PROGRESS NOTE ADULT - SUBJECTIVE AND OBJECTIVE BOX
ANN BLANCO  72y  Male    Patient is a 72y old  Male who presents with a chief complaint of Stroke workup (21 Jul 2022 14:13)      HPI:  Patient is a 71M with PMH of CVA, partial seizures (follows with Dr. Bullock), ADAIR, DM, DLD, BPH, gait disorder (mainly mechanical), presented to the ED for AMS and dizziness that began last night around 23:00. Per patient and son at bedside, patient was sitting on the sofa last night watching television when he suddenly began to feel strange. Son mentions that patient started hallucinating seeing cartoon characters in the air. Patient subsequently attempted to stand up to go to the bathroom but immediately required assistance from his son as he felt dizzy and had a sensation of the room spinning. He went to bed but woke up several times throughout the night, still confused and still dizzy. The symptoms were still present this morning, prompting a visit to the ED. On ED arrival, CTH showed new focal hypodensities involving the left cerebellar hemisphere as well as the left periatrial white matter consistent with age indeterminate lacunar infarcts. Per son, patient's memory and cognition have been gradually declining over the last 2 months. He often forgets family members, forgets where he is, and at times talks nonsense. Son endorses that patient has been taking all of his medications, including AEDs (Keppra 750mg BID, Oxcarbazepine 300mg BID), as prescribed.  (20 Jul 2022 16:35)    S: Patient was examined and seen at bedside. This morning pt is resting comfortably in bed and reports no new issues or overnight events. No complaints, feels well. Awaiting TTE.  Denies CP, SOB, N/V/D/C/AP, cough, F, chills, dizziness, new focal weakness, HA, vision changes, dysuria, or urinary symptoms, blood in stool.  ROS: all other systems reviewed and are negative    PAST MEDICAL & SURGICAL HISTORY:  Nonintractable epilepsy without status epilepticus, unspecified epilepsy type      Neuropathy      Hypertension, unspecified type      Type 2 diabetes mellitus with complication, without long-term current use of insulin      Anxiety      Cerebrovascular accident (CVA), unspecified mechanism      History of surgery  BROKEN LEG SURGERY      Capsular cataract of right eye        SOCIAL HISTORY:  Tobacco: denies  Illicit drugs: denies  Alcohol: social  Family history reviewed and otherwise non-contributory  ALLERGIES: NKDA    General: NAD. Looks stated age.  HEENT: clean oropharynx, EOMI, no LAD  Neck: trachea midline, no thyromegaly  CV: nl S1 S2; no m/r/g  Resp: decreased breath sounds at base  GI: NT/ND/S +BS  MS: no clubbing/cyanosis/edema, +pulses  Neuro: motor, sensory intact; + muscle rigidity all extremities  Skin: no rashes, nl turgor  Psychiatric: AA0x3 w/ good insight and judgement    tele: SR, nonspecific changes (on my own evaluation of tele monitor)            MEDICATIONS  (STANDING):  aspirin  chewable 81 milliGRAM(s) Enteral Tube daily  atorvastatin 40 milliGRAM(s) Oral at bedtime  busPIRone 10 milliGRAM(s) Oral two times a day  citalopram 20 milliGRAM(s) Oral daily  clopidogrel Tablet 75 milliGRAM(s) Oral daily  dextrose 5%. 1000 milliLiter(s) (50 mL/Hr) IV Continuous <Continuous>  dextrose 5%. 1000 milliLiter(s) (100 mL/Hr) IV Continuous <Continuous>  dextrose 50% Injectable 25 Gram(s) IV Push once  dextrose 50% Injectable 12.5 Gram(s) IV Push once  dextrose 50% Injectable 25 Gram(s) IV Push once  enalapril 20 milliGRAM(s) Oral two times a day  glucagon  Injectable 1 milliGRAM(s) IntraMuscular once  heparin   Injectable 5000 Unit(s) SubCutaneous every 8 hours  insulin lispro (ADMELOG) corrective regimen sliding scale   SubCutaneous three times a day before meals  levETIRAcetam 1000 milliGRAM(s) Oral two times a day  OXcarbazepine 300 milliGRAM(s) Oral two times a day  tamsulosin 0.4 milliGRAM(s) Oral at bedtime    MEDICATIONS  (PRN):  dextrose Oral Gel 15 Gram(s) Oral once PRN Blood Glucose LESS THAN 70 milliGRAM(s)/deciliter    Home Medications:  busPIRone 15 mg oral tablet: 1 tab(s) orally once a day (20 Jul 2022 16:56)  citalopram 20 mg oral tablet: 1 tab(s) orally once a day (20 Jul 2022 16:56)  enalapril 20 mg oral tablet: 1 tab(s) orally 2 times a day (20 Jul 2022 16:56)  Janumet  mg-1000 mg oral tablet, extended release: 1 tab(s) orally once a day (in the evening) (20 Jul 2022 16:56)  Keppra 750 mg oral tablet: 1 tab(s) orally 2 times a day (20 Jul 2022 16:56)  Lipitor 40 mg oral tablet: 1 tab(s) orally once a day (20 Jul 2022 16:56)  metoprolol succinate 50 mg oral tablet, extended release: 1 tab(s) orally once a day (20 Jul 2022 16:56)  OXcarbazepine 300 mg oral tablet: 1 tab(s) orally 2 times a day (20 Jul 2022 16:56)  oxybutynin 15 mg/24 hr oral tablet, extended release: 1 tab(s) orally once a day (20 Jul 2022 16:56)  Plavix 75 mg oral tablet: 1 tab(s) orally once a day (20 Jul 2022 16:56)    Vital Signs Last 24 Hrs  T(C): 36.4 (26 Jul 2022 13:46), Max: 36.6 (25 Jul 2022 20:05)  T(F): 97.5 (26 Jul 2022 13:46), Max: 97.9 (25 Jul 2022 20:05)  HR: 81 (26 Jul 2022 13:46) (80 - 84)  BP: 180/92 (26 Jul 2022 13:46) (146/66 - 180/92)  BP(mean): 95 (26 Jul 2022 05:16) (95 - 95)  RR: 18 (26 Jul 2022 13:46) (18 - 19)  SpO2: --      CAPILLARY BLOOD GLUCOSE      POCT Blood Glucose.: 202 mg/dL (26 Jul 2022 11:21)  POCT Blood Glucose.: 143 mg/dL (26 Jul 2022 07:21)  POCT Blood Glucose.: 143 mg/dL (25 Jul 2022 21:18)  POCT Blood Glucose.: 134 mg/dL (25 Jul 2022 15:59)    LABS:                        14.8   6.53  )-----------( 172      ( 26 Jul 2022 09:27 )             44.4     07-26    138  |  102  |  15  ----------------------------<  142<H>  4.0   |  23  |  0.8    Ca    9.2      26 Jul 2022 09:27  Phos  4.0     07-26  Mg     1.8     07-26                        Consultant Notes Reviewed:  [x ] YES  [ ] NO  Care Discussed with Consultants/Other Providers/ Housestaff [ x] YES  [ ] NO  Radiology, labs, new studies personally reviewed.

## 2022-07-26 NOTE — PROGRESS NOTE ADULT - ASSESSMENT
71 M with PMHx of stroke, partial seizures (follows with Dr. Bullock), ADAIR, DM, DLD, BPH, gait disorder (mainly mechanical), presented to the ED for AMS and dizziness that began last night around 23:00. Per patient and son at bedside, patient was sitting on the sofa last night watching television when he suddenly began to feel strange.  On ED arrival, CTH showed new focal hypodensities involving the left cerebellar hemisphere as well as the left periatrial white matter consistent with age indeterminate lacunar infarcts. Per son, patient's memory and cognition have been gradually declining over the last 2 months.     Plan:  Neuro  - CTH: new focal hypodensities involving the left cerebellar hemisphere as well as the left periatrial white matter consistent with age indeterminate lacunar infarcts.  - MRI Brain:No MR evidence of acute infarct or large intraparenchymal hemorrhage.  - MR angio: No flow related signal involving the right vertebral artery is identified. This is new when compared with the prior exam.  Stable area of prominent flow related signal involving the the anterior communicating artery region.  Tiny stenosis suspected in the midportion left left posterior cerebral artery  - MRI c-spine: multilevel mild degenerative changes upon a congenitally   narrow cervical spine canal without evidence of cord compression.  - EEG: generalized slowing   - F/u Echo, serum TSH, B12, RPR and ammonia level   - Continue ASA 81mg qd  - Continue Plavix 75mg qd  - Continue home Lipitor 40mg qd  - LDL: 52, A1c: 6.6   - Q8 neuro checks  - Can follow up outpatient for Neuropsych eval and movement ds clinic   - f/u PT recommendations for home vs rehab     #Seizure Disorder  - C/w home Keppra, increased to 1000 mg BID  - C/w home Oxcarbazepine 300mg BID    Cardio  #HTN  - Continue home enalapril 20mg daily   - Monitor vitals     #Dyslipidemia  - Atorvastatin 40mg daily as above    Psych  #Depression  - Continue buspirone 10 mg and citalopram 20mg daily     Endo  #DM  - ISS  - Fingersticks before meals and at bedtime    BPH  - C/w home Oxybutynin 15mg daily    Misc  - Diet: Carb Consistent  - Activity: IAT  - DVT Prophylaxis: Lovenox 40mg daily  - GI Prophylaxis: None  - Code Status: full code  - Disposition: home

## 2022-07-27 ENCOUNTER — TRANSCRIPTION ENCOUNTER (OUTPATIENT)
Age: 72
End: 2022-07-27

## 2022-07-27 VITALS — WEIGHT: 315 LBS | HEIGHT: 66 IN

## 2022-07-27 LAB
GLUCOSE BLDC GLUCOMTR-MCNC: 130 MG/DL — HIGH (ref 70–99)
GLUCOSE BLDC GLUCOMTR-MCNC: 160 MG/DL — HIGH (ref 70–99)

## 2022-07-27 PROCEDURE — 99232 SBSQ HOSP IP/OBS MODERATE 35: CPT

## 2022-07-27 PROCEDURE — 99231 SBSQ HOSP IP/OBS SF/LOW 25: CPT

## 2022-07-27 RX ORDER — OXYBUTYNIN CHLORIDE 5 MG
10 TABLET ORAL DAILY
Refills: 0 | Status: DISCONTINUED | OUTPATIENT
Start: 2022-07-27 | End: 2022-07-27

## 2022-07-27 RX ORDER — CITALOPRAM 10 MG/1
1 TABLET, FILM COATED ORAL
Qty: 0 | Refills: 0 | DISCHARGE
Start: 2022-07-27

## 2022-07-27 RX ORDER — FINASTERIDE 5 MG/1
1 TABLET, FILM COATED ORAL
Qty: 0 | Refills: 0 | DISCHARGE
Start: 2022-07-27

## 2022-07-27 RX ORDER — LEVETIRACETAM 250 MG/1
1 TABLET, FILM COATED ORAL
Qty: 0 | Refills: 0 | DISCHARGE
Start: 2022-07-27

## 2022-07-27 RX ORDER — ATORVASTATIN CALCIUM 80 MG/1
1 TABLET, FILM COATED ORAL
Qty: 0 | Refills: 0 | DISCHARGE
Start: 2022-07-27

## 2022-07-27 RX ORDER — LEVETIRACETAM 250 MG/1
1 TABLET, FILM COATED ORAL
Qty: 0 | Refills: 0 | DISCHARGE

## 2022-07-27 RX ORDER — CITALOPRAM 10 MG/1
1 TABLET, FILM COATED ORAL
Qty: 0 | Refills: 0 | DISCHARGE

## 2022-07-27 RX ORDER — METOPROLOL TARTRATE 50 MG
1 TABLET ORAL
Qty: 0 | Refills: 0 | DISCHARGE
Start: 2022-07-27

## 2022-07-27 RX ORDER — ATORVASTATIN CALCIUM 80 MG/1
1 TABLET, FILM COATED ORAL
Qty: 0 | Refills: 0 | DISCHARGE

## 2022-07-27 RX ORDER — LACTULOSE 10 G/15ML
10 SOLUTION ORAL ONCE
Refills: 0 | Status: COMPLETED | OUTPATIENT
Start: 2022-07-27 | End: 2022-07-27

## 2022-07-27 RX ORDER — FINASTERIDE 5 MG/1
5 TABLET, FILM COATED ORAL DAILY
Refills: 0 | Status: DISCONTINUED | OUTPATIENT
Start: 2022-07-27 | End: 2022-07-27

## 2022-07-27 RX ORDER — CLOPIDOGREL BISULFATE 75 MG/1
1 TABLET, FILM COATED ORAL
Qty: 0 | Refills: 0 | DISCHARGE
Start: 2022-07-27

## 2022-07-27 RX ORDER — OXCARBAZEPINE 300 MG/1
1 TABLET, FILM COATED ORAL
Qty: 0 | Refills: 0 | DISCHARGE
Start: 2022-07-27

## 2022-07-27 RX ORDER — ASPIRIN/CALCIUM CARB/MAGNESIUM 324 MG
1 TABLET ORAL
Qty: 0 | Refills: 0 | DISCHARGE
Start: 2022-07-27

## 2022-07-27 RX ORDER — AMLODIPINE BESYLATE 2.5 MG/1
1 TABLET ORAL
Qty: 0 | Refills: 0 | DISCHARGE
Start: 2022-07-27

## 2022-07-27 RX ORDER — METOPROLOL TARTRATE 50 MG
1 TABLET ORAL
Qty: 0 | Refills: 0 | DISCHARGE

## 2022-07-27 RX ORDER — CLOPIDOGREL BISULFATE 75 MG/1
1 TABLET, FILM COATED ORAL
Qty: 0 | Refills: 0 | DISCHARGE

## 2022-07-27 RX ORDER — TAMSULOSIN HYDROCHLORIDE 0.4 MG/1
1 CAPSULE ORAL
Qty: 0 | Refills: 0 | DISCHARGE
Start: 2022-07-27

## 2022-07-27 RX ORDER — OXCARBAZEPINE 300 MG/1
1 TABLET, FILM COATED ORAL
Qty: 0 | Refills: 0 | DISCHARGE

## 2022-07-27 RX ADMIN — HEPARIN SODIUM 5000 UNIT(S): 5000 INJECTION INTRAVENOUS; SUBCUTANEOUS at 05:35

## 2022-07-27 RX ADMIN — AMLODIPINE BESYLATE 5 MILLIGRAM(S): 2.5 TABLET ORAL at 05:37

## 2022-07-27 RX ADMIN — FINASTERIDE 5 MILLIGRAM(S): 5 TABLET, FILM COATED ORAL at 14:11

## 2022-07-27 RX ADMIN — Medication 10 MILLIGRAM(S): at 05:34

## 2022-07-27 RX ADMIN — Medication 1: at 12:14

## 2022-07-27 RX ADMIN — HEPARIN SODIUM 5000 UNIT(S): 5000 INJECTION INTRAVENOUS; SUBCUTANEOUS at 14:17

## 2022-07-27 RX ADMIN — CLOPIDOGREL BISULFATE 75 MILLIGRAM(S): 75 TABLET, FILM COATED ORAL at 12:15

## 2022-07-27 RX ADMIN — Medication 20 MILLIGRAM(S): at 05:33

## 2022-07-27 RX ADMIN — Medication 81 MILLIGRAM(S): at 12:15

## 2022-07-27 RX ADMIN — Medication 10 MILLIGRAM(S): at 14:40

## 2022-07-27 RX ADMIN — LEVETIRACETAM 1000 MILLIGRAM(S): 250 TABLET, FILM COATED ORAL at 05:34

## 2022-07-27 RX ADMIN — Medication 50 MILLIGRAM(S): at 05:36

## 2022-07-27 RX ADMIN — OXCARBAZEPINE 300 MILLIGRAM(S): 300 TABLET, FILM COATED ORAL at 05:33

## 2022-07-27 RX ADMIN — LACTULOSE 10 GRAM(S): 10 SOLUTION ORAL at 12:18

## 2022-07-27 RX ADMIN — CITALOPRAM 20 MILLIGRAM(S): 10 TABLET, FILM COATED ORAL at 12:15

## 2022-07-27 NOTE — PROGRESS NOTE ADULT - REASON FOR ADMISSION
Stroke workup

## 2022-07-27 NOTE — PROGRESS NOTE ADULT - PROVIDER SPECIALTY LIST ADULT
Neurology
Physiatry
Internal Medicine
Neurology
Hospitalist
Hospitalist
Internal Medicine
Hospitalist
Hospitalist

## 2022-07-27 NOTE — PROGRESS NOTE ADULT - TIME BILLING
71M PMHx CVA, seizure disorder, DM2, BPH here with altered mental status, dizziness; code stroke.    IMP  #Altered mental status, dizziness; code stroke    mri no acute infarct    mra no flow R vert artery  #DM2  #h/o CVA  #BPH    PLAN  asa, plavix  lipitor 80  bp goal per neuro  PT    Radha  [id number]
71M PMHx CVA, seizure disorder, DM2, BPH here with altered mental status, dizziness; code stroke.    IMP  #Altered mental status, dizziness; code stroke    mri no acute infarct  #DM2  #h/o CVA  #BPH    PLAN  asa, plavix  lipitor 80  bp goal per neuro  PT    Radha  1008
Review of imaging and chart; obtaining history; examination of pt; discussion and coordination of care.
time spent on review of labs, imaging studies, old records, obtaining history, personally examining patient, counselling and communicating with patient/ family, entering orders for medications/tests/etc, discussions with other health care providers, documentation in electronic health records, independent interpretation of labs, imaging/procedure results and care coordination.

## 2022-07-27 NOTE — DISCHARGE NOTE PROVIDER - CARE PROVIDER_API CALL
Gladys Foster)  Neurology; Vascular Neurology  [address], [address] [place] [id number]  Phone: [phone]  Fax: [phone]  Follow Up Time: 2 weeks    Flaquita Busots)  Cardiovascular Disease; Internal Medicine  [address] [place] [address], [address]  [place], [place] [id number]  Phone: [phone]  Fax: [phone]  Follow Up Time: 2 weeks    Rohit Bullock)  Neurology  [address] [place] [address], [address]  [place] [address]  Phone: [phone]  Fax: [phone]  Follow Up Time: 2 weeks

## 2022-07-27 NOTE — PROGRESS NOTE ADULT - ASSESSMENT
71M with PMH of stroke, partial seizures (follows with Dr. Bullock), ADAIR, DM, DLD, BPH, gait disorder (mainly mechanical), presented to the ED for AMS and dizziness that began last night around 23:00. Per patient and son at bedside, patient was sitting on the sofa last night watching television when he suddenly began to feel strange.  On ED arrival, CTH showed new focal hypodensities involving the left cerebellar hemisphere as well as the left periatrial white matter consistent with age indeterminate lacunar infarcts. Per son, patient's memory and cognition have been gradually declining over the last 2 months.     #AMS/Dizziness/Chronic strokes ?Small acute ischemic stroke / R/o Parkinsons dz  - CTH: new focal hypodensities involving the left cerebellar hemisphere as well as the left periatrial white matter consistent with age indeterminate lacunar infarcts.  - MR brain non-cont; possible small acute infarct as per neuro  - Echo nl EF  - ASA 81mg qd  - Plavix 75mg qd  - C/w home Lipitor 40mg qd  - LDL:  52 ,  A1c: 6.6   -f/u TSH, B12, RPR, ammonia  -Sinemet as per neuro    Seizure Disorder  - C/w home Keppra 750mg BID  - C/w home Oxcarbazepine 300mg BID  -  REEG : diffuse slowing    HTN  - CW home enalapril 20mg BID   -resume Metoprolol  - monitor vitals   -consider adding a low dose Norvasc    Depression  - cw buspirone 10mg and citalopram 20mg daily      DM  - ISS  - Fingersticks before meals and at bedtime    BPH/?Urinary retension  - C/w home Oxybutynin 15mg daily  -check bladder scan  -Flomax    Dyslipidemia  - Atorvastatin 40mg daily as above    Misc  - Diet: Carb Consistent  - Activity: IAT  - DVT Prophylaxis: Lovenox 40mg daily, SCDs  - GI Prophylaxis: none  - Code Status: full code        My note supersedes the residents note should a discrepancy arise.    Chart and notes personally reviewed.  Care Discussed with Consultants/Other Providers/ Housestaff [ x] YES [ ] NO   Radiology, labs, old records personally reviewed.    discussed w/ housestaff, nursing, case management, neuro team

## 2022-07-27 NOTE — CONSULT NOTE ADULT - SUBJECTIVE AND OBJECTIVE BOX
HPI:  Patient is a 71M with PMH of CVA, partial seizures (follows with Dr. Bullock), ADAIR, DM, DLD, BPH, gait disorder (mainly mechanical), presented to the ED for AMS and dizziness that began last night around 23:00. Per patient and son at bedside, patient was sitting on the sofa last night watching television when he suddenly began to feel strange. Son mentions that patient started hallucinating seeing cartoon characters in the air. Patient subsequently attempted to stand up to go to the bathroom but immediately required assistance from his son as he felt dizzy and had a sensation of the room spinning. He went to bed but woke up several times throughout the night, still confused and still dizzy. The symptoms were still present this morning, prompting a visit to the ED. On ED arrival, CTH showed new focal hypodensities involving the left cerebellar hemisphere as well as the left periatrial white matter consistent with age indeterminate lacunar infarcts. Per son, patient's memory and cognition have been gradually declining over the last 2 months. He often forgets family members, forgets where he is, and at times talks nonsense. Son endorses that patient has been taking all of his medications, including AEDs (Keppra 750mg BID, Oxcarbazepine 300mg BID), as prescribed.       PAST MEDICAL & SURGICAL HISTORY:  Nonintractable epilepsy without status epilepticus, unspecified epilepsy type      Neuropathy      Hypertension, unspecified type      Type 2 diabetes mellitus with complication, without long-term current use of insulin      Anxiety      Cerebrovascular accident (CVA), unspecified mechanism      History of surgery  BROKEN LEG SURGERY      Capsular cataract of right eye          Hospital Course:    TODAY'S SUBJECTIVE & REVIEW OF SYMPTOMS:     Constitutional WNL   Cardio WNL   Resp WNL   GI WNL  Heme WNL  Endo WNL  Skin WNL  MSK WNL  Neuro AMS  Cognitive WNL  Psych WNL      MEDICATIONS  (STANDING):  aspirin  chewable 81 milliGRAM(s) Enteral Tube daily  atorvastatin 40 milliGRAM(s) Oral at bedtime  busPIRone 10 milliGRAM(s) Oral two times a day  citalopram 20 milliGRAM(s) Oral daily  clopidogrel Tablet 75 milliGRAM(s) Oral daily  enalapril 20 milliGRAM(s) Oral two times a day  heparin   Injectable 5000 Unit(s) SubCutaneous every 8 hours  levETIRAcetam 750 milliGRAM(s) Oral two times a day  OXcarbazepine 300 milliGRAM(s) Oral two times a day  oxybutynin 10 milliGRAM(s) Oral daily    MEDICATIONS  (PRN):      FAMILY HISTORY:      Allergies    No Known Allergies    Intolerances        SOCIAL HISTORY:    [  ] Etoh  [  ] Smoking  [  ] Substance abuse     Home Environment:  [   ] Home Alone  [ x  ] Lives with Family  [   ] Home Health Aid    Dwelling:  [   ] Apartment  [  x ] Private House  [   ] Adult Home  [   ] Skilled Nursing Facility      [   ] Short Term  [   ] Long Term  [  x ] Stairs       Elevator [   ]    FUNCTIONAL STATUS PTA: (Check all that apply)  Ambulation: [  x  ]Independent    [   ] Dependent     [   ] Non-Ambulatory  Assistive Device: [   ] SA Cane  [   ]  Q Cane  [   ] Walker  [   ]  Wheelchair  ADL : [  x] Independent  [    ]  Dependent       Vital Signs Last 24 Hrs  T(C): 35.7 (2022 06:30), Max: 36.5 (2022 16:34)  T(F): 96.2 (2022 06:30), Max: 97.7 (2022 16:34)  HR: 54 (2022 06:30) (54 - 78)  BP: 158/77 (2022 06:30) (153/76 - 184/84)  BP(mean): 110 (2022 06:30) (108 - 110)  RR: 18 (2022 06:30) (18 - 18)  SpO2: 100% (2022 20:54) (96% - 100%)    Parameters below as of 2022 06:30  Patient On (Oxygen Delivery Method): room air          PHYSICAL EXAM: Awake & Alert  GENERAL: NAD  HEAD:  Normocephalic  CHEST/LUNG: Clear   HEART: S1S2+  ABDOMEN: Soft, Nontender  EXTREMITIES:  no calf tenderness    NERVOUS SYSTEM:  Cranial Nerves 2-12 intact [   ] Abnormal  [   ]  ROM: WFL all extremities [  x ]  Abnormal [   ]  Motor Strength: WFL all extremities  [  x ]  Abnormal [   ]  Sensation: intact to light touch [  x ] Abnormal [   ]    FUNCTIONAL STATUS:  Bed Mobility: Independent [   ]  Supervision [   ]  Needs Assistance [ x  ]  N/A [   ]  Transfers: Independent [   ]  Supervision [   ]  Needs Assistance [ x  ]  N/A [   ]   Ambulation: Independent [   ]  Supervision [   ]  Needs Assistance [  x ]  N/A [   ]  ADL: Independent [   ] Requires Assistance [   ] N/A [   ]      LABS:                        14.8   8.86  )-----------( 170      ( 2022 10:40 )             42.7     07-    138  |  101  |  17  ----------------------------<  95  4.0   |  25  |  0.8    Ca    8.8      2022 08:42  Mg     1.8         TPro  6.3  /  Alb  3.8  /  TBili  0.4  /  DBili  x   /  AST  17  /  ALT  14  /  AlkPhos  60        Urinalysis Basic - ( 2022 11:20 )    Color: Yellow / Appearance: Clear / S.024 / pH: x  Gluc: x / Ketone: Negative  / Bili: Negative / Urobili: <2 mg/dL   Blood: x / Protein: 30 mg/dL / Nitrite: Negative   Leuk Esterase: Negative / RBC: 1 /HPF / WBC 2 /HPF   Sq Epi: x / Non Sq Epi: 1 /HPF / Bacteria: Negative        RADIOLOGY & ADDITIONAL STUDIES:  
Patient is a 72y old  Male who presents with a chief complaint of Stroke workup (27 Jul 2022 10:27)    HPI: Patient is a 71M with PMH of CVA, partial seizures (follows with Dr. Bullock), ADAIR, DM, DLD, BPH, gait disorder (mainly mechanical), presented to the ED for AMS and dizziness that began last night around 23:00. Per patient and son at bedside, patient was sitting on the sofa last night watching television when he suddenly began to feel strange. Son mentions that patient started hallucinating seeing cartoon characters in the air. Patient subsequently attempted to stand up to go to the bathroom but immediately required assistance from his son as he felt dizzy and had a sensation of the room spinning. He went to bed but woke up several times throughout the night, still confused and still dizzy. The symptoms were still present this morning, prompting a visit to the ED. On ED arrival, CTH showed new focal hypodensities involving the left cerebellar hemisphere as well as the left periatrial white matter consistent with age indeterminate lacunar infarcts. Per son, patient's memory and cognition have been gradually declining over the last 2 months. He often forgets family members, forgets where he is, and at times talks nonsense.       PAST MEDICAL & SURGICAL HISTORY:  Nonintractable epilepsy without status epilepticus, unspecified epilepsy type      Neuropathy      Hypertension, unspecified type      Type 2 diabetes mellitus with complication, without long-term current use of insulin      Anxiety      Cerebrovascular accident (CVA), unspecified mechanism      History of surgery  BROKEN LEG SURGERY      Capsular cataract of right eye    PREVIOUS DIAGNOSTIC TESTING:      ECHO  FINDINGS:  < from: TTE Echo Complete w/o Contrast w/ Doppler (07.26.22 @ 16:05) >  Summary:   1. Left ventricular ejection fraction, by visual estimation, is 50 to   55%.   2. Normal global left ventricular systolic function.   3. Spectral Doppler shows impaired relaxation pattern of left   ventricular myocardial filling (Grade I diastolic dysfunction).   4. Mildly enlarged left atrium.   5. LA volume Index is 41.5 ml/m² ml/m2.   6. Normal right atrial size.    < end of copied text >    STRESS  FINDINGS:    CATHETERIZATION  FINDINGS:    ELECTROPHYSIOLOGY STUDY  FINDINGS:    CAROTID ULTRASOUND:  FINDINGS    VENOUS DUPLEX SCAN:  FINDINGS:    CHEST CT PULMONARY ANGIO with IV Contrast:  FINDINGS:    MEDICATIONS  (STANDING):  amLODIPine   Tablet 5 milliGRAM(s) Oral daily  aspirin  chewable 81 milliGRAM(s) Enteral Tube daily  atorvastatin 40 milliGRAM(s) Oral at bedtime  busPIRone 10 milliGRAM(s) Oral two times a day  citalopram 20 milliGRAM(s) Oral daily  clopidogrel Tablet 75 milliGRAM(s) Oral daily  dextrose 5%. 1000 milliLiter(s) (50 mL/Hr) IV Continuous <Continuous>  dextrose 5%. 1000 milliLiter(s) (100 mL/Hr) IV Continuous <Continuous>  dextrose 50% Injectable 25 Gram(s) IV Push once  dextrose 50% Injectable 12.5 Gram(s) IV Push once  dextrose 50% Injectable 25 Gram(s) IV Push once  enalapril 20 milliGRAM(s) Oral two times a day  finasteride 5 milliGRAM(s) Oral daily  glucagon  Injectable 1 milliGRAM(s) IntraMuscular once  heparin   Injectable 5000 Unit(s) SubCutaneous every 8 hours  insulin lispro (ADMELOG) corrective regimen sliding scale   SubCutaneous three times a day before meals  levETIRAcetam 1000 milliGRAM(s) Oral two times a day  metoprolol succinate ER 50 milliGRAM(s) Oral daily  OXcarbazepine 300 milliGRAM(s) Oral two times a day  oxybutynin 10 milliGRAM(s) Oral daily  tamsulosin 0.4 milliGRAM(s) Oral at bedtime    MEDICATIONS  (PRN):  dextrose Oral Gel 15 Gram(s) Oral once PRN Blood Glucose LESS THAN 70 milliGRAM(s)/deciliter    FAMILY HISTORY: No significant hx    SOCIAL HISTORY: No smoking, ETOH or illicit drug use    Past Surgical History: as listed above    Allergies:  No Known Allergies      REVIEW OF SYSTEMS:  CONSTITUTIONAL: No fever, weight loss, chills, shakes, or fatigue  RESPIRATORY: No cough, wheezing, hemoptysis, or shortness of breath  CARDIOVASCULAR: No chest pain, dyspnea, palpitations, dizziness, syncope, paroxysmal nocturnal dyspnea, orthopnea, or arm or leg swelling  GASTROINTESTINAL: No abdominal  or epigastric pain, nausea, vomiting, hematemesis, diarrhea, constipation, melena or bright red blood.  NEUROLOGICAL: No headaches, memory loss, slurred speech, limb weakness, loss of strength, numbness, or tremors  MUSCULOSKELETAL: No joint pain or swelling, muscle, back, or extremity pain      Vital Signs Last 24 Hrs  T(C): 35.3 (27 Jul 2022 13:48), Max: 36.8 (26 Jul 2022 21:35)  T(F): 95.6 (27 Jul 2022 13:48), Max: 98.2 (26 Jul 2022 21:35)  HR: 69 (27 Jul 2022 13:48) (69 - 77)  BP: 145/74 (27 Jul 2022 13:48) (143/83 - 147/74)  BP(mean): 103 (26 Jul 2022 21:35) (103 - 103)  RR: 18 (27 Jul 2022 13:48) (18 - 18)  SpO2: 94% (26 Jul 2022 21:35) (94% - 94%)    Parameters below as of 26 Jul 2022 21:35  Patient On (Oxygen Delivery Method): room air        PHYSICAL EXAM:  GENERAL: In no apparent distress, well nourished, and hydrated.  NECK: Supple, No JVD   HEART: Regular rate and rhythm; No murmurs, rubs, or gallops.  PULMONARY: Clear to auscultation and perfusion.  No rales, wheezing, or rhonchi bilaterally.  EXTREMITIES:  2+ Peripheral Pulses, no LE edema BL  NEUROLOGICAL: Grossly nonfocal      INTERPRETATION OF TELEMETRY: NSR 65 bpm    ECG:  < from: 12 Lead ECG (07.20.22 @ 11:13) >  Ventricular Rate 68 BPM    Atrial Rate 68 BPM    P-R Interval 168 ms    QRS Duration 168 ms    Q-T Interval 460 ms    QTC Calculation(Bazett) 489 ms    P Axis 65 degrees    R Axis 110 degrees    T Axis 3 degrees    Diagnosis Line Normal sinus rhythm  Right bundle branch block  Abnormal ECG    Confirmed by ELYSSA SANDOVAL MD ([id number]) on 7/20/2022 11:36:50 AM    < end of copied text >      I&O's Detail      LABS:                        14.8   6.53  )-----------( 172      ( 26 Jul 2022 09:27 )             44.4     07-26    138  |  102  |  15  ----------------------------<  142<H>  4.0   |  23  |  0.8    Ca    9.2      26 Jul 2022 09:27  Phos  4.0     07-26  Mg     1.8     07-26              BNP  I&O's Detail    Daily     Daily     RADIOLOGY & ADDITIONAL STUDIES:
ANN BLANCO  72y  Male    Patient is a 72y old  Male who presents with a chief complaint of Stroke workup (2022 14:13)      HPI:  Patient is a 71M with PMH of CVA, partial seizures (follows with Dr. Bullock), ADAIR, DM, DLD, BPH, gait disorder (mainly mechanical), presented to the ED for AMS and dizziness that began last night around 23:00. Per patient and son at bedside, patient was sitting on the sofa last night watching television when he suddenly began to feel strange. Son mentions that patient started hallucinating seeing cartoon characters in the air. Patient subsequently attempted to stand up to go to the bathroom but immediately required assistance from his son as he felt dizzy and had a sensation of the room spinning. He went to bed but woke up several times throughout the night, still confused and still dizzy. The symptoms were still present this morning, prompting a visit to the ED. On ED arrival, CTH showed new focal hypodensities involving the left cerebellar hemisphere as well as the left periatrial white matter consistent with age indeterminate lacunar infarcts. Per son, patient's memory and cognition have been gradually declining over the last 2 months. He often forgets family members, forgets where he is, and at times talks nonsense. Son endorses that patient has been taking all of his medications, including AEDs (Keppra 750mg BID, Oxcarbazepine 300mg BID), as prescribed.  (2022 16:35)    S: Patient was examined and seen at bedside. This morning pt is resting comfortably in bed and reports no new issues or overnight events. No complaints, feels better. Reports being less confused and feeling stronger.  Denies CP, SOB, N/V/D/C/AP, cough, F, chills, dizziness, new focal weakness, HA, vision changes, dysuria, or urinary symptoms, blood in stool.  ROS: all other systems reviewed and are negative    PAST MEDICAL & SURGICAL HISTORY:  Nonintractable epilepsy without status epilepticus, unspecified epilepsy type      Neuropathy      Hypertension, unspecified type      Type 2 diabetes mellitus with complication, without long-term current use of insulin      Anxiety      Cerebrovascular accident (CVA), unspecified mechanism      History of surgery  BROKEN LEG SURGERY      Capsular cataract of right eye        SOCIAL HISTORY:  Tobacco: denies  Illicit drugs: denies  Alcohol: social  Family history reviewed and otherwise non-contributory  ALLERGIES: NKDA    MEDICATIONS  (STANDING):  aspirin  chewable 81 milliGRAM(s) Enteral Tube daily  atorvastatin 40 milliGRAM(s) Oral at bedtime  busPIRone 10 milliGRAM(s) Oral two times a day  citalopram 20 milliGRAM(s) Oral daily  clopidogrel Tablet 75 milliGRAM(s) Oral daily  enalapril 20 milliGRAM(s) Oral two times a day  heparin   Injectable 5000 Unit(s) SubCutaneous every 8 hours  levETIRAcetam 750 milliGRAM(s) Oral two times a day  OXcarbazepine 300 milliGRAM(s) Oral two times a day  oxybutynin 10 milliGRAM(s) Oral daily    MEDICATIONS  (PRN):    Home Medications:  busPIRone 15 mg oral tablet: 1 tab(s) orally once a day (2022 16:56)  citalopram 20 mg oral tablet: 1 tab(s) orally once a day (2022 16:56)  enalapril 20 mg oral tablet: 1 tab(s) orally 2 times a day (2022 16:56)  Janumet  mg-1000 mg oral tablet, extended release: 1 tab(s) orally once a day (in the evening) (2022 16:56)  Keppra 750 mg oral tablet: 1 tab(s) orally 2 times a day (2022 16:56)  Lipitor 40 mg oral tablet: 1 tab(s) orally once a day (2022 16:56)  metoprolol succinate 50 mg oral tablet, extended release: 1 tab(s) orally once a day (2022 16:56)  OXcarbazepine 300 mg oral tablet: 1 tab(s) orally 2 times a day (2022 16:56)  oxybutynin 15 mg/24 hr oral tablet, extended release: 1 tab(s) orally once a day (2022 16:56)  Plavix 75 mg oral tablet: 1 tab(s) orally once a day (2022 16:56)          Vital Signs Last 24 Hrs  T(C): 34.8 (2022 14:37), Max: 36.5 (2022 16:34)  T(F): 94.7 (2022 14:37), Max: 97.7 (2022 16:34)  HR: 66 (2022 14:37) (54 - 78)  BP: 105/53 (2022 14:37) (105/53 - 184/84)  BP(mean): 76 (2022 14:37) (76 - 110)  RR: 18 (2022 14:37) (18 - 18)  SpO2: 100% (2022 20:54) (96% - 100%)    Parameters below as of 2022 06:30  Patient On (Oxygen Delivery Method): room air      CAPILLARY BLOOD GLUCOSE      POCT Blood Glucose.: 157 mg/dL (2022 11:47)  POCT Blood Glucose.: 97 mg/dL (2022 07:38)      General: NAD. Looks stated age.  HEENT: clean oropharynx, EOMI, no LAD  Neck: trachea midline, no thyromegaly  CV: nl S1 S2; no m/r/g  Resp: decreased breath sounds at base  GI: NT/ND/S +BS  MS: no clubbing/cyanosis/edema, +pulses  Neuro: motor, sensory intact; + reflexes  Skin: no rashes, nl turgor  Psychiatric: AA0x3 w/ intact insight and judgement    tele: SR, nonspecific changes (on my own evaluation of tele monitor)    LABS:                        14.8   8.86  )-----------( 170      ( 2022 10:40 )             42.7     07-21    138  |  101  |  17  ----------------------------<  95  4.0   |  25  |  0.8    Ca    8.8      2022 08:42  Mg     1.8     07-20    TPro  6.3  /  Alb  3.8  /  TBili  0.4  /  DBili  x   /  AST  17  /  ALT  14  /  AlkPhos  60  07-20    LIVER FUNCTIONS - ( 2022 10:40 )  Alb: 3.8 g/dL / Pro: 6.3 g/dL / ALK PHOS: 60 U/L / ALT: 14 U/L / AST: 17 U/L / GGT: x           CARDIAC MARKERS ( 2022 10:40 )  x     / <0.01 ng/mL / x     / x     / x            Urinalysis Basic - ( 2022 11:20 )    Color: Yellow / Appearance: Clear / S.024 / pH: x  Gluc: x / Ketone: Negative  / Bili: Negative / Urobili: <2 mg/dL   Blood: x / Protein: 30 mg/dL / Nitrite: Negative   Leuk Esterase: Negative / RBC: 1 /HPF / WBC 2 /HPF   Sq Epi: x / Non Sq Epi: 1 /HPF / Bacteria: Negative            EKG - SR, nonspecific changes (my read)  Chart and consultant noted personally reviewed.  Care Discussed with Consultants/Other Providers/ Housestaff [ x] YES [ ] NO   Radiology, labs, old records personally reviewed.

## 2022-07-27 NOTE — DISCHARGE NOTE PROVIDER - PROVIDER TOKENS
PROVIDER:[TOKEN:[[id number]],FOLLOWUP:[2 weeks]],PROVIDER:[TOKEN:[[id number]],FOLLOWUP:[2 weeks]],PROVIDER:[TOKEN:[[id number]],FOLLOWUP:[2 weeks]]

## 2022-07-27 NOTE — DISCHARGE NOTE PROVIDER - NSDCMRMEDTOKEN_GEN_ALL_CORE_FT
busPIRone 15 mg oral tablet: 1 tab(s) orally once a day  citalopram 20 mg oral tablet: 1 tab(s) orally once a day  enalapril 20 mg oral tablet: 1 tab(s) orally 2 times a day  Janumet  mg-1000 mg oral tablet, extended release: 1 tab(s) orally once a day (in the evening)  Keppra 750 mg oral tablet: 1 tab(s) orally 2 times a day  Lipitor 40 mg oral tablet: 1 tab(s) orally once a day  metoprolol succinate 50 mg oral tablet, extended release: 1 tab(s) orally once a day  OXcarbazepine 300 mg oral tablet: 1 tab(s) orally 2 times a day  oxybutynin 15 mg/24 hr oral tablet, extended release: 1 tab(s) orally once a day  Plavix 75 mg oral tablet: 1 tab(s) orally once a day   amLODIPine 5 mg oral tablet: 1 tab(s) orally once a day  aspirin 81 mg oral tablet, chewable: 1 tab(s) orally once a day  atorvastatin 40 mg oral tablet: 1 tab(s) orally once a day (at bedtime)  busPIRone 10 mg oral tablet: 1 tab(s) orally 2 times a day  citalopram 20 mg oral tablet: 1 tab(s) orally once a day  clopidogrel 75 mg oral tablet: 1 tab(s) orally once a day  enalapril 20 mg oral tablet: 1 tab(s) orally 2 times a day  finasteride 5 mg oral tablet: 1 tab(s) orally once a day  Janumet  mg-1000 mg oral tablet, extended release: 1 tab(s) orally once a day (in the evening)  levETIRAcetam 1000 mg oral tablet: 1 tab(s) orally 2 times a day  metoprolol succinate 50 mg oral tablet, extended release: 1 tab(s) orally once a day  OXcarbazepine 300 mg oral tablet: 1 tab(s) orally 2 times a day  oxybutynin 15 mg/24 hr oral tablet, extended release: 1 tab(s) orally once a day  tamsulosin 0.4 mg oral capsule: 1 cap(s) orally once a day (at bedtime)

## 2022-07-27 NOTE — DISCHARGE NOTE PROVIDER - CARE PROVIDERS DIRECT ADDRESSES
[email or web address]

## 2022-07-27 NOTE — DISCHARGE NOTE PROVIDER - NSDCFUSCHEDAPPT_GEN_ALL_CORE_FT
Corwin Oneal  [facility] Physician [facility]  PULMMED [address]  Scheduled Appointment: 08/10/2022    Rohit Bullock  [facility] Physician [facility]  NEUROLOGY [address]  Scheduled Appointment: 09/01/2022

## 2022-07-27 NOTE — CONSULT NOTE ADULT - ASSESSMENT
71M with PMH of stroke, partial seizures (follows with Dr. Bullock), ADAIR, DM, DLD, BPH, gait disorder (mainly mechanical), presented to the ED for AMS and dizziness that began last night around 23:00. Per patient and son at bedside, patient was sitting on the sofa last night watching television when he suddenly began to feel strange.  On ED arrival, CTH showed new focal hypodensities involving the left cerebellar hemisphere as well as the left periatrial white matter consistent with age indeterminate lacunar infarcts. Per son, patient's memory and cognition have been gradually declining over the last 2 months.     #AMS/Dizziness/Abnormal CTH R/o CVA  - CTH: new focal hypodensities involving the left cerebellar hemisphere as well as the left periatrial white matter consistent with age indeterminate lacunar infarcts.  - f/u MR brain noncon, MR angio, MR C-spine noncon  - f/u Echo  - ASA 81mg qd  - Plavix 75mg qd  - C/w home Lipitor 40mg qd  - LDL:  52 ,  A1c: 6.6   - Q4 neuro checks  -check TSH, B12, RPR, ammonia    Seizure Disorder  - C/w home Keppra 750mg BID  - C/w home Oxcarbazepine 300mg BID  - F/U REEG     HTN  - CW home enalapril 20mg daily   - monitor vitals     Depression  - cw buspirone 10mg and citalopram 20mg daily      DM  - ISS  - Fingersticks before meals and at bedtime    BPH  - C/w home Oxybutynin 15mg daily    Dyslipidemia  - Atorvastatin 40mg daily as above    Misc  - Diet: Carb Consistent  - Activity: IAT  - DVT Prophylaxis: Lovenox 40mg daily, SCDs  - GI Prophylaxis: none  - Code Status: full code  - Disposition: acute    #Progress Note Handoff  Pending: Consults____Clinical improvement and stability__x___Tests__MRI, TTE______PT____x____  Pt/Family discussion: Pt informed and agrees with the current plan  Disposition: Home______/SNF_______/4A______/To be determined____x____    My note supersedes the residents note should a discrepancy arise.    Chart and notes personally reviewed.  Care Discussed with Consultants/Other Providers/ Housestaff [ x] YES [ ] NO   Radiology, labs, old records personally reviewed.    discussed w/ housestaff, nursing, case management, neuro team  
Assessment: 71M with PMH of stroke, partial seizures (follows with Dr. Bullock), ADAIR, DM, DLD, BPH, gait disorder (mainly mechanical), presented to the ED for AMS and dizziness. On ED arrival, CTH showed new focal hypodensities involving the left cerebellar hemisphere as well as the left periatrial white matter consistent with age indeterminate lacunar infarcts.    Impression:  Acute CVA  Partial Seizures  ADAIR  DM  HLD    Plan:  - Recommend loop recorder for further outpatient monitoring to r/o AF  - Cont tele monitoring while in hospital  - Will plan for ILR likely tomorrow as add-on case  - Does not need to be NPO for procedure  - Will follow
IMPRESSION: Rehab of r/o CVA    PRECAUTIONS: [   ] Cardiac  [   ] Respiratory  [   ] Seizures [   ] Contact Isolation  [   ] Droplet Isolation  [   ] Other    Weight Bearing Status:     RECOMMENDATION:    Out of Bed to Chair     DVT/Decubiti Prophylaxis    REHAB PLAN:     [  x  ] Bedside P/T 3-5 times a week   [ x   ]   Bedside O/T  2-3 times a week             [  x  ] Speech Therapy               [    ]  No Rehab Therapy Indicated   Conditioning/ROM                                    ADL  Bed Mobility                                               Conditioning/ROM  Transfers                                                     Bed Mobility  Sitting /Standing Balance                         Transfers                                        Gait Training                                               Sitting/Standing Balance  Stair Training [   ]Applicable                    Home equipment Eval                                                                        Splinting  [   ] Only      GOALS:   ADL   [  x  ]   Independent                    Transfers  [  x  ] Independent                          Ambulation  [ x   ] Independent     [   x  ] With device                            [    ]  CG                                                         [    ]  CG                                                                  [    ] CG                            [    ] Min A                                                   [    ] Min A                                                              [    ] Min  A          DISCHARGE PLAN:   [    ]  Good candidate for Intensive Rehabilitation/Hospital based                                             Will tolerate 3hrs Intensive Rehab Daily                                       [  x   ]  Short Term Rehab in Skilled Nursing Facility                            vs          [ x    ]  Home with Outpatient or VN services                                         [     ]  Possible Candidate for Intensive Hospital based Rehab

## 2022-07-27 NOTE — DISCHARGE NOTE PROVIDER - HOSPITAL COURSE
Hospital course:  Patient is a 71M with PMH of CVA, partial seizures (follows with Dr. Bullock), ADAIR, DM, DLD, BPH, gait disorder (mainly mechanical), presented to the ED for AMS and dizziness that began last night around 23:00. Per patient and son at bedside, patient was sitting on the sofa last night watching television when he suddenly began to feel strange. Son mentions that patient started hallucinating seeing cartoon characters in the air. Patient subsequently attempted to stand up to go to the bathroom but immediately required assistance from his son as he felt dizzy and had a sensation of the room spinning. He went to bed but woke up several times throughout the night, still confused and still dizzy. The symptoms were still present this morning, prompting a visit to the ED. On ED arrival, CTH showed new focal hypodensities involving the left cerebellar hemisphere as well as the left periatrial white matter consistent with age indeterminate lacunar infarcts. Per son, patient's memory and cognition have been gradually declining over the last 2 months. He often forgets family members, forgets where he is, and at times talks nonsense. Son endorses that patient has been taking all of his medications, including AEDs (Keppra 750mg BID, Oxcarbazepine 300mg BID), as prescribed.     During this hospital course, patient was found to have a left cerebellar hemisphere stroke  The stroke etiology is likely secondary to: Small vessel disease from atherosclerotic risk factors vs arrhythmia    Patient had the following workup done in house:  - CTH: new focal hypodensities involving the left cerebellar hemisphere as well as the left periatrial white matter consistent with age indeterminate lacunar infarcts.  - MRI Brain:No MR evidence of acute infarct or large intraparenchymal hemorrhage.  - MR angio: No flow related signal involving the right vertebral artery is identified. This is new when compared with the prior exam.  Stable area of prominent flow related signal involving the the anterior communicating artery region.  Tiny stenosis suspected in the midportion left left posterior cerebral artery  - MRI c-spine: multilevel mild degenerative changes upon a congenitally   narrow cervical spine canal without evidence of cord compression.  - EEG: generalized slowing   - TTE: . Left ventricular ejection fraction, by visual estimation, is 50 to 55%. Mildly enlarged LA    Physical exam at discharge:  General:  Appearance is consistent with chronologic age.  Mask facies.  Gross skin survey within normal limits.    Cognitive/Language:  Awake, alert, and oriented to person, place, time and date.  Recent and remote memory intact.  Fund of knowledge is appropriate.  Naming, repetition and comprehension intact.   Nondysarthric.    Cranial Nerves  - Eyes: Visual acuity intact, Visual fields full.  EOMI w/o nystagmus, skew or reported double vision.  PERRL.   - Face:  Facial sensation normal V1 - 3, no facial asymmetry.    - Ears/Nose/Throat:  Hearing grossly intact b/l to finger rub.  Palate elevates midline.  Tongue and uvula midline.   Motor examination:  Upper Extremities: L 5/5, R 5/5; Lower extremities: L 5/5, R 5/5.  No observable drift. Normal tone and bulk. Normal tone in all extremities   Sensory examination:   Intact to light touch and pinprick, and pain in all extremities.  Reflexes:  2+ b/l biceps, triceps, brachioradialis, 3+ patella and 2+ achilles.    Cerebellum: negative for ataxia     New medications on discharge: none  Labs to be followed up: none  Imaging to be done as outpatient: none  Further outpatient workup: follow up with Neurology   Hospital course:  Patient is a 71M with PMH of CVA, partial seizures (follows with Dr. Bullock), ADAIR, DM, DLD, BPH, gait disorder (mainly mechanical), presented to the ED for AMS and dizziness that began last night around 23:00. Per patient and son at bedside, patient was sitting on the sofa last night watching television when he suddenly began to feel strange. Son mentions that patient started hallucinating seeing cartoon characters in the air. Patient subsequently attempted to stand up to go to the bathroom but immediately required assistance from his son as he felt dizzy and had a sensation of the room spinning. He went to bed but woke up several times throughout the night, still confused and still dizzy. The symptoms were still present this morning, prompting a visit to the ED. On ED arrival, CTH showed new focal hypodensities involving the left cerebellar hemisphere as well as the left periatrial white matter consistent with age indeterminate lacunar infarcts. Per son, patient's memory and cognition have been gradually declining over the last 2 months. He often forgets family members, forgets where he is, and at times talks nonsense. Son endorses that patient has been taking all of his medications, including AEDs (Keppra 750mg BID, Oxcarbazepine 300mg BID), as prescribed.     During this hospital course, patient was found to have a left cerebellar hemisphere stroke  The stroke etiology is likely secondary to: Small vessel disease from atherosclerotic risk factors vs arrhythmia    Patient had the following workup done in house:  - CTH: new focal hypodensities involving the left cerebellar hemisphere as well as the left periatrial white matter consistent with age indeterminate lacunar infarcts.  - MRI Brain:No MR evidence of acute infarct or large intraparenchymal hemorrhage.  - MR angio: No flow related signal involving the right vertebral artery is identified. This is new when compared with the prior exam.  Stable area of prominent flow related signal involving the the anterior communicating artery region.  Tiny stenosis suspected in the midportion left left posterior cerebral artery  - MRI c-spine: multilevel mild degenerative changes upon a congenitally   narrow cervical spine canal without evidence of cord compression.  - EEG: generalized slowing   - TTE: . Left ventricular ejection fraction, by visual estimation, is 50 to 55%. Mildly enlarged LA    Physical exam at discharge:  General:  Appearance is consistent with chronologic age.  Mask facies.  Gross skin survey within normal limits.    Cognitive/Language:  Awake, alert, and oriented to person, place, time and date.  Recent and remote memory intact.  Fund of knowledge is appropriate.  Naming, repetition and comprehension intact.   Nondysarthric.    Cranial Nerves  - Eyes: Visual acuity intact, Visual fields full.  EOMI w/o nystagmus, skew or reported double vision.  PERRL.   - Face:  Facial sensation normal V1 - 3, no facial asymmetry.    - Ears/Nose/Throat:  Hearing grossly intact b/l to finger rub.  Palate elevates midline.  Tongue and uvula midline.   Motor examination:  Upper Extremities: L 5/5, R 5/5; Lower extremities: L 5/5, R 5/5.  No observable drift. Normal tone and bulk. Normal tone in all extremities   Sensory examination:   Intact to light touch and pinprick, and pain in all extremities.  Reflexes:  2+ b/l biceps, triceps, brachioradialis, 3+ patella and 2+ achilles.    Cerebellum: negative for ataxia     New medications on discharge: none  Labs to be followed up: none  Imaging to be done as outpatient: none  Further outpatient workup: follow up with Neurology. Follow up with Cardiology for placement of loop recorder

## 2022-07-27 NOTE — PROGRESS NOTE ADULT - SUBJECTIVE AND OBJECTIVE BOX
ANN BLANCO  72y  Male    Patient is a 72y old  Male who presents with a chief complaint of Stroke workup (21 Jul 2022 14:13)      HPI:  Patient is a 71M with PMH of CVA, partial seizures (follows with Dr. Bullock), ADAIR, DM, DLD, BPH, gait disorder (mainly mechanical), presented to the ED for AMS and dizziness that began last night around 23:00. Per patient and son at bedside, patient was sitting on the sofa last night watching television when he suddenly began to feel strange. Son mentions that patient started hallucinating seeing cartoon characters in the air. Patient subsequently attempted to stand up to go to the bathroom but immediately required assistance from his son as he felt dizzy and had a sensation of the room spinning. He went to bed but woke up several times throughout the night, still confused and still dizzy. The symptoms were still present this morning, prompting a visit to the ED. On ED arrival, CTH showed new focal hypodensities involving the left cerebellar hemisphere as well as the left periatrial white matter consistent with age indeterminate lacunar infarcts. Per son, patient's memory and cognition have been gradually declining over the last 2 months. He often forgets family members, forgets where he is, and at times talks nonsense. Son endorses that patient has been taking all of his medications, including AEDs (Keppra 750mg BID, Oxcarbazepine 300mg BID), as prescribed.  (20 Jul 2022 16:35)    S: Patient was examined and seen at bedside. This morning pt is resting comfortably in bed and reports no new issues or overnight events. Reports inability to completely empty the bladder. No dysuria.    ROS: all other systems reviewed and are negative    PAST MEDICAL & SURGICAL HISTORY:  Nonintractable epilepsy without status epilepticus, unspecified epilepsy type      Neuropathy      Hypertension, unspecified type      Type 2 diabetes mellitus with complication, without long-term current use of insulin      Anxiety      Cerebrovascular accident (CVA), unspecified mechanism      History of surgery  BROKEN LEG SURGERY      Capsular cataract of right eye        SOCIAL HISTORY:  Tobacco: denies  Illicit drugs: denies  Alcohol: social  Family history reviewed and otherwise non-contributory  ALLERGIES: NKDA    General: NAD. Looks stated age.  HEENT: clean oropharynx, EOMI, no LAD  Neck: trachea midline, no thyromegaly  CV: nl S1 S2; no m/r/g  Resp: decreased breath sounds at base  GI: NT/ND/S +BS  MS: no clubbing/cyanosis/edema, +pulses  Neuro: motor, sensory intact; + muscle rigidity all extremities  Skin: no rashes, nl turgor  Psychiatric: AA0x3 w/ fair insight and judgement    tele: SR, nonspecific changes (on my own evaluation of tele monitor)            MEDICATIONS  (STANDING):  amLODIPine   Tablet 5 milliGRAM(s) Oral daily  aspirin  chewable 81 milliGRAM(s) Enteral Tube daily  atorvastatin 40 milliGRAM(s) Oral at bedtime  busPIRone 10 milliGRAM(s) Oral two times a day  citalopram 20 milliGRAM(s) Oral daily  clopidogrel Tablet 75 milliGRAM(s) Oral daily  dextrose 5%. 1000 milliLiter(s) (50 mL/Hr) IV Continuous <Continuous>  dextrose 5%. 1000 milliLiter(s) (100 mL/Hr) IV Continuous <Continuous>  dextrose 50% Injectable 25 Gram(s) IV Push once  dextrose 50% Injectable 12.5 Gram(s) IV Push once  dextrose 50% Injectable 25 Gram(s) IV Push once  enalapril 20 milliGRAM(s) Oral two times a day  finasteride 5 milliGRAM(s) Oral daily  glucagon  Injectable 1 milliGRAM(s) IntraMuscular once  heparin   Injectable 5000 Unit(s) SubCutaneous every 8 hours  insulin lispro (ADMELOG) corrective regimen sliding scale   SubCutaneous three times a day before meals  levETIRAcetam 1000 milliGRAM(s) Oral two times a day  metoprolol succinate ER 50 milliGRAM(s) Oral daily  OXcarbazepine 300 milliGRAM(s) Oral two times a day  tamsulosin 0.4 milliGRAM(s) Oral at bedtime    MEDICATIONS  (PRN):  dextrose Oral Gel 15 Gram(s) Oral once PRN Blood Glucose LESS THAN 70 milliGRAM(s)/deciliter    Home Medications:  amLODIPine 5 mg oral tablet: 1 tab(s) orally once a day (27 Jul 2022 12:46)  aspirin 81 mg oral tablet, chewable: 1 tab(s) orally once a day (27 Jul 2022 12:46)  atorvastatin 40 mg oral tablet: 1 tab(s) orally once a day (at bedtime) (27 Jul 2022 12:46)  busPIRone 10 mg oral tablet: 1 tab(s) orally 2 times a day (27 Jul 2022 12:46)  citalopram 20 mg oral tablet: 1 tab(s) orally once a day (27 Jul 2022 12:46)  clopidogrel 75 mg oral tablet: 1 tab(s) orally once a day (27 Jul 2022 12:46)  enalapril 20 mg oral tablet: 1 tab(s) orally 2 times a day (27 Jul 2022 12:46)  finasteride 5 mg oral tablet: 1 tab(s) orally once a day (27 Jul 2022 12:46)  Janumet  mg-1000 mg oral tablet, extended release: 1 tab(s) orally once a day (in the evening) (20 Jul 2022 16:56)  levETIRAcetam 1000 mg oral tablet: 1 tab(s) orally 2 times a day (27 Jul 2022 12:46)  metoprolol succinate 50 mg oral tablet, extended release: 1 tab(s) orally once a day (27 Jul 2022 12:46)  OXcarbazepine 300 mg oral tablet: 1 tab(s) orally 2 times a day (27 Jul 2022 12:46)  oxybutynin 15 mg/24 hr oral tablet, extended release: 1 tab(s) orally once a day (20 Jul 2022 16:56)  tamsulosin 0.4 mg oral capsule: 1 cap(s) orally once a day (at bedtime) (27 Jul 2022 12:46)    Vital Signs Last 24 Hrs  T(C): 35.3 (27 Jul 2022 13:48), Max: 36.8 (26 Jul 2022 21:35)  T(F): 95.6 (27 Jul 2022 13:48), Max: 98.2 (26 Jul 2022 21:35)  HR: 69 (27 Jul 2022 13:48) (69 - 84)  BP: 145/74 (27 Jul 2022 13:48) (143/83 - 160/79)  BP(mean): 103 (26 Jul 2022 21:35) (103 - 112)  RR: 18 (27 Jul 2022 13:48) (18 - 18)  SpO2: 94% (26 Jul 2022 21:35) (94% - 94%)    Parameters below as of 26 Jul 2022 21:35  Patient On (Oxygen Delivery Method): room air      CAPILLARY BLOOD GLUCOSE      POCT Blood Glucose.: 160 mg/dL (27 Jul 2022 11:27)  POCT Blood Glucose.: 130 mg/dL (27 Jul 2022 07:31)  POCT Blood Glucose.: 149 mg/dL (26 Jul 2022 21:35)  POCT Blood Glucose.: 139 mg/dL (26 Jul 2022 17:11)    LABS:                        14.8   6.53  )-----------( 172      ( 26 Jul 2022 09:27 )             44.4     07-26    138  |  102  |  15  ----------------------------<  142<H>  4.0   |  23  |  0.8    Ca    9.2      26 Jul 2022 09:27  Phos  4.0     07-26  Mg     1.8     07-26                        Consultant Notes Reviewed:  [x ] YES  [ ] NO  Care Discussed with Consultants/Other Providers/ Housestaff [ x] YES  [ ] NO  Radiology, labs, new studies personally reviewed.

## 2022-07-27 NOTE — DISCHARGE NOTE PROVIDER - NSDCCPCAREPLAN_GEN_ALL_CORE_FT
PRINCIPAL DISCHARGE DIAGNOSIS  Diagnosis: Stroke  Assessment and Plan of Treatment: During this hospital admission, you had an ischemic stroke. During an ischemic stroke, blood stops flowing to part of your brain because of a blockage in the blood vessel. This can damage areas in the brain that control other parts of the body.  Please take your aspirin and plavix for blood thinning and Atorvastatin for cholesterol medication/blood vessel protection as prescribed to prevent further strokes. Do not skip doses and do not run low on your medication. If you run low on your medication, please contact your doctor.  You will follow up outpatient with the stroke Nurse Practitioner as scheduled below.  Doing your regular tasks may be difficult after you've had a stroke, but you can learn new ways to manage your daily activities. In fact, doing daily activities may help you to regain muscle strength. Be patient, give yourself time to adjust, and appreciate the progress you make. For example, when showering or bathing, test the water temperature with a hand or foot that was not affected by the stroke, use grab bars, a shower seat, a hand-held showerhead, etc. It is normal to feel fatigue after a stroke, while some days may be worse than others, you will continue to improve.  Call 911 right away if you have any of the following symptoms of another stroke:  B: Balance: Sudden: Dizziness, loss of balance, or a sense of falling, difficulty with coordinating movement  E: Eyes: Sudden double vision or trouble seeing in one or both eyes  F: Face: Sudden uneven face  A: Arms (Legs): Sudden weakness, tingling, or loss of feeling on one side of your face or body  S: Speech: Sudden trouble talking or slurred speech, sudden difficulty understanding others  T: Time: Please call 911 right away and go to the emergency room  •Sudden, severe headache  •Blackouts or seizures      SECONDARY DISCHARGE DIAGNOSES  Diagnosis: Unsteady gait  Assessment and Plan of Treatment:

## 2022-08-01 DIAGNOSIS — Z86.73 PERSONAL HISTORY OF TRANSIENT ISCHEMIC ATTACK (TIA), AND CEREBRAL INFARCTION WITHOUT RESIDUAL DEFICITS: ICD-10-CM

## 2022-08-01 DIAGNOSIS — I10 ESSENTIAL (PRIMARY) HYPERTENSION: ICD-10-CM

## 2022-08-01 DIAGNOSIS — R26.81 UNSTEADINESS ON FEET: ICD-10-CM

## 2022-08-01 DIAGNOSIS — Z79.82 LONG TERM (CURRENT) USE OF ASPIRIN: ICD-10-CM

## 2022-08-01 DIAGNOSIS — I63.532 CEREBRAL INFARCTION DUE TO UNSPECIFIED OCCLUSION OR STENOSIS OF LEFT POSTERIOR CEREBRAL ARTERY: ICD-10-CM

## 2022-08-01 DIAGNOSIS — E78.5 HYPERLIPIDEMIA, UNSPECIFIED: ICD-10-CM

## 2022-08-01 DIAGNOSIS — G40.909 EPILEPSY, UNSPECIFIED, NOT INTRACTABLE, WITHOUT STATUS EPILEPTICUS: ICD-10-CM

## 2022-08-01 DIAGNOSIS — E11.40 TYPE 2 DIABETES MELLITUS WITH DIABETIC NEUROPATHY, UNSPECIFIED: ICD-10-CM

## 2022-08-01 DIAGNOSIS — R33.8 OTHER RETENTION OF URINE: ICD-10-CM

## 2022-08-01 DIAGNOSIS — F32.A DEPRESSION, UNSPECIFIED: ICD-10-CM

## 2022-08-01 DIAGNOSIS — N40.1 BENIGN PROSTATIC HYPERPLASIA WITH LOWER URINARY TRACT SYMPTOMS: ICD-10-CM

## 2022-08-01 DIAGNOSIS — G47.33 OBSTRUCTIVE SLEEP APNEA (ADULT) (PEDIATRIC): ICD-10-CM

## 2022-08-01 DIAGNOSIS — R44.1 VISUAL HALLUCINATIONS: ICD-10-CM

## 2022-08-01 DIAGNOSIS — I63.81 OTHER CEREBRAL INFARCTION DUE TO OCCLUSION OR STENOSIS OF SMALL ARTERY: ICD-10-CM

## 2022-08-01 DIAGNOSIS — R29.700 NIHSS SCORE 0: ICD-10-CM

## 2022-08-01 DIAGNOSIS — Z79.84 LONG TERM (CURRENT) USE OF ORAL HYPOGLYCEMIC DRUGS: ICD-10-CM

## 2022-08-10 ENCOUNTER — APPOINTMENT (OUTPATIENT)
Age: 72
End: 2022-08-10

## 2022-08-26 ENCOUNTER — TRANSCRIPTION ENCOUNTER (OUTPATIENT)
Age: 72
End: 2022-08-26

## 2022-08-30 ENCOUNTER — APPOINTMENT (OUTPATIENT)
Dept: UROLOGY | Facility: CLINIC | Age: 72
End: 2022-08-30

## 2022-08-30 VITALS
DIASTOLIC BLOOD PRESSURE: 88 MMHG | SYSTOLIC BLOOD PRESSURE: 132 MMHG | WEIGHT: 224 LBS | BODY MASS INDEX: 36 KG/M2 | HEIGHT: 66 IN | HEART RATE: 67 BPM

## 2022-08-30 PROCEDURE — 99214 OFFICE O/P EST MOD 30 MIN: CPT

## 2022-08-30 NOTE — ASSESSMENT
[FreeTextEntry1] : Discussed with patient and son the multifactorial nature of urinary frequency.  I discussed with him his blood sugar and recommend being vigilant about checking blood sugar and better blood sugar control.  Have not checked blood sugar since discharge from hospital and agree to start doing this.\par \par I discussed the neurological reasons as a possible cause of urinary frequency and they will follow-up with his neurologist as planned.\par \par Because he had previously improved significantly with PTNS they would like to start monthly maintenance treatment of this also.

## 2022-08-30 NOTE — PHYSICAL EXAM
[General Appearance - In No Acute Distress] : no acute distress [] : no respiratory distress [Oriented To Time, Place, And Person] : oriented to person, place, and time [FreeTextEntry1] : Uses a walker

## 2022-08-30 NOTE — HISTORY OF PRESENT ILLNESS
[FreeTextEntry1] : 72-year-old with history of urinary frequency treated with PTNS with improvement.  Since then he has been hospitalized for a cerebrovascular event.  MRI showed age-indeterminate lesions in the cerebellar area.  He has new vertebral artery disease and he also has cerebrovascular disease.  He is following up with neurology.  I noted that during that hospital stay he did have high blood sugars with history of diabetes.  Complains that his urinary frequency has returned but somewhat improved since discharge from hospital.  He is here with his son.\par \par His son relates changes in mental status and ambulatory status for the last few months.

## 2022-09-01 ENCOUNTER — APPOINTMENT (OUTPATIENT)
Dept: NEUROLOGY | Facility: CLINIC | Age: 72
End: 2022-09-01

## 2022-09-01 PROCEDURE — 99214 OFFICE O/P EST MOD 30 MIN: CPT

## 2022-09-01 RX ORDER — CITALOPRAM 10 MG/1
TABLET, FILM COATED ORAL
Refills: 0 | Status: DISCONTINUED | COMMUNITY
End: 2022-09-01

## 2022-09-01 RX ORDER — OXYBUTYNIN CHLORIDE 10 MG/1
10 TABLET, EXTENDED RELEASE ORAL
Qty: 90 | Refills: 3 | Status: DISCONTINUED | COMMUNITY
Start: 2021-01-08 | End: 2022-09-01

## 2022-09-01 RX ORDER — CLOPIDOGREL BISULFATE 300 MG/1
TABLET, FILM COATED ORAL
Refills: 0 | Status: DISCONTINUED | COMMUNITY
End: 2022-09-01

## 2022-09-01 RX ORDER — ATORVASTATIN CALCIUM 80 MG/1
TABLET, FILM COATED ORAL
Refills: 0 | Status: DISCONTINUED | COMMUNITY
End: 2022-09-01

## 2022-09-02 NOTE — ASSESSMENT
[FreeTextEntry1] : 1---- partial  epilepsy\par 2- CVA\par 3- ADAIR\par 4- DM\par 5- neuropathy\par 6- HTN\par 7- orthostatic hypotention\par 8- spine disease\par 9- Gait D/o -  multifactorial\par \par \par plan\par discussed the risks for fall \par discussed Orthostatic  hypotension\par discussed diet\par \par plan home visiting nurse\par BP diary\par discuss with the PMD to decrease/adjust the BP med\par

## 2022-09-02 NOTE — PHYSICAL EXAM
[FreeTextEntry1] : \par BP sitting 88/60 standing  64/\par \par A/A/Ox3\par tired and sleepy\par follows commands\par slight psychomotor slowing\par Good language\par no drift\par walks with a stooped posture and with the walker\par no change in tone\par no bradykinesia\par no tremor\par walks with the walker

## 2022-09-02 NOTE — HISTORY OF PRESENT ILLNESS
[FreeTextEntry1] : Ric is here with his son for the F/U\par He was admitted to [facility] in July for having had a period of not being himself, complaining of tiredness and also acting confused.\par All these happened in the context of grieving for his son who passed at young age in [place].\par \par He was in his early 30s . This made Ric quite upset.in the hospital he returned to the baseline. continued to have some difficulty with ambulation. was discharged to the rehab and his stay there in the rehab for two weeks he did well.\par In the hospital on arrival he did have a CT of the head that raised the possibility of the new hypo densities in the cerebellum  and ....\par He had an MRI that did not show any new finding and CTA  and MRA that showed no flow in the right vertebral artery and compared to the prior study in 2016 , this was deemed to be new.\par \par In the rehab he did very well.\par However going home on the second night rushing to the bathroom he did have a fall. In fact since coming home he had two falls. He does not know why but says at times feels dizzy and also week in the legs\par His eating and daily activities at home is not regular. Family is trying their best , however everybody is busy and his wife is also in the NH\par . while in the rehab they also added Amlodipine and Flomax to his meds\par According to the son his blood pressure is often in the range of 110 to 120  \par recently he also saw his PMD.

## 2022-09-21 ENCOUNTER — APPOINTMENT (OUTPATIENT)
Dept: UROLOGY | Facility: CLINIC | Age: 72
End: 2022-09-21

## 2022-09-21 PROCEDURE — 64566 NEUROELTRD STIM POST TIBIAL: CPT

## 2022-10-06 ENCOUNTER — APPOINTMENT (OUTPATIENT)
Age: 72
End: 2022-10-06

## 2022-10-06 VITALS
OXYGEN SATURATION: 98 % | WEIGHT: 230 LBS | RESPIRATION RATE: 14 BRPM | BODY MASS INDEX: 36.96 KG/M2 | HEART RATE: 57 BPM | HEIGHT: 66 IN | SYSTOLIC BLOOD PRESSURE: 130 MMHG | DIASTOLIC BLOOD PRESSURE: 80 MMHG

## 2022-10-06 PROCEDURE — 99213 OFFICE O/P EST LOW 20 MIN: CPT

## 2022-10-06 NOTE — ASSESSMENT
[FreeTextEntry1] : HO Severe ADAIR did not tolerate nor benefit from BiPAP \par SP 25 Pds weight loss\par Considering the Inspire procedure \par

## 2022-10-08 ENCOUNTER — EMERGENCY (EMERGENCY)
Facility: HOSPITAL | Age: 72
LOS: 0 days | Discharge: HOME | End: 2022-10-08
Attending: EMERGENCY MEDICINE | Admitting: EMERGENCY MEDICINE
Payer: MEDICARE

## 2022-10-08 VITALS
WEIGHT: 220.02 LBS | DIASTOLIC BLOOD PRESSURE: 86 MMHG | HEART RATE: 56 BPM | RESPIRATION RATE: 18 BRPM | HEIGHT: 66 IN | OXYGEN SATURATION: 99 % | SYSTOLIC BLOOD PRESSURE: 192 MMHG | TEMPERATURE: 97 F

## 2022-10-08 DIAGNOSIS — G40.909 EPILEPSY, UNSPECIFIED, NOT INTRACTABLE, WITHOUT STATUS EPILEPTICUS: ICD-10-CM

## 2022-10-08 DIAGNOSIS — Z79.84 LONG TERM (CURRENT) USE OF ORAL HYPOGLYCEMIC DRUGS: ICD-10-CM

## 2022-10-08 DIAGNOSIS — K08.89 OTHER SPECIFIED DISORDERS OF TEETH AND SUPPORTING STRUCTURES: ICD-10-CM

## 2022-10-08 DIAGNOSIS — Z98.41 CATARACT EXTRACTION STATUS, RIGHT EYE: ICD-10-CM

## 2022-10-08 DIAGNOSIS — Z79.02 LONG TERM (CURRENT) USE OF ANTITHROMBOTICS/ANTIPLATELETS: ICD-10-CM

## 2022-10-08 DIAGNOSIS — E11.40 TYPE 2 DIABETES MELLITUS WITH DIABETIC NEUROPATHY, UNSPECIFIED: ICD-10-CM

## 2022-10-08 DIAGNOSIS — Z86.73 PERSONAL HISTORY OF TRANSIENT ISCHEMIC ATTACK (TIA), AND CEREBRAL INFARCTION WITHOUT RESIDUAL DEFICITS: ICD-10-CM

## 2022-10-08 DIAGNOSIS — H26.9 UNSPECIFIED CATARACT: Chronic | ICD-10-CM

## 2022-10-08 DIAGNOSIS — Z98.890 OTHER SPECIFIED POSTPROCEDURAL STATES: Chronic | ICD-10-CM

## 2022-10-08 DIAGNOSIS — Z79.82 LONG TERM (CURRENT) USE OF ASPIRIN: ICD-10-CM

## 2022-10-08 DIAGNOSIS — Z98.890 OTHER SPECIFIED POSTPROCEDURAL STATES: ICD-10-CM

## 2022-10-08 DIAGNOSIS — G47.33 OBSTRUCTIVE SLEEP APNEA (ADULT) (PEDIATRIC): ICD-10-CM

## 2022-10-08 DIAGNOSIS — K91.840 POSTPROCEDURAL HEMORRHAGE OF A DIGESTIVE SYSTEM ORGAN OR STRUCTURE FOLLOWING A DIGESTIVE SYSTEM PROCEDURE: ICD-10-CM

## 2022-10-08 DIAGNOSIS — E78.5 HYPERLIPIDEMIA, UNSPECIFIED: ICD-10-CM

## 2022-10-08 DIAGNOSIS — N40.0 BENIGN PROSTATIC HYPERPLASIA WITHOUT LOWER URINARY TRACT SYMPTOMS: ICD-10-CM

## 2022-10-08 DIAGNOSIS — F41.9 ANXIETY DISORDER, UNSPECIFIED: ICD-10-CM

## 2022-10-08 PROCEDURE — 99284 EMERGENCY DEPT VISIT MOD MDM: CPT

## 2022-10-08 NOTE — CONSULT NOTE ADULT - SUBJECTIVE AND OBJECTIVE BOX
Patient is a 72y old  Male who presents with a chief complaint of bleeding  from areas post-op extractions    HPI:  72 y.o. male with PMH of CVA on plavix, partial seizures (follows with Dr. Bullock), ADAIR, DM, DLD, BPH, gait disorder (mainly mechanical) presents to the ED with dental bleeding. Patient states that he underwent extraction of tooth #3 and #12 on 10/04 and today around 4pm experienced bleeding of left upper jaw around tooth #12. Patient states that he thinks he saw some stitches in his bloody sputum but is not completely sure. At this time bleeding is controlled with gauze pads. Denies dizziness, lightheadedness, shortness of breath, chest pain, dental pain.        PAST MEDICAL & SURGICAL HISTORY:  Nonintractable epilepsy without status epilepticus, unspecified epilepsy type      Neuropathy      Hypertension, unspecified type      Type 2 diabetes mellitus with complication, without long-term current use of insulin      Anxiety      Cerebrovascular accident (CVA), unspecified mechanism      History of surgery  BROKEN LEG SURGERY      Capsular cataract of right eye    MEDICATIONS  (STANDING):    MEDICATIONS  (PRN):      Allergies    No Known Allergies    Intolerances        FAMILY HISTORY:      *SOCIAL HISTORY: ( -  ) Tobacco; ( -  ) ETOH    *Last Dental Visit:    Vital Signs Last 24 Hrs  T(C): 36.1 (08 Oct 2022 19:31), Max: 36.1 (08 Oct 2022 19:31)  T(F): 96.9 (08 Oct 2022 19:31), Max: 96.9 (08 Oct 2022 19:31)  HR: 56 (08 Oct 2022 19:31) (56 - 56)  BP: 192/86 (08 Oct 2022 19:31) (192/86 - 192/86)  BP(mean): --  RR: 18 (08 Oct 2022 19:31) (18 - 18)  SpO2: 99% (08 Oct 2022 19:31) (99% - 99%)    Parameters below as of 08 Oct 2022 19:31  Patient On (Oxygen Delivery Method): room air        LABS:                  EOE:  TMJ ( -  ) clicks                     ( -  ) pops                     ( -  ) crepitus             Mandible <<FROM>>             Facial bones and MOM <<grossly intact>>             ( -  ) trismus             ( -  ) lymphadenopathy             ( -  ) swelling             ( -  ) asymmetry             ( -  ) palpation             ( -  ) dyspnea             ( - ) dysphagia             ( -  ) loss of consciousness    IOE:  <<permanent>> dentition: <<multiple missing teeth>>           hard/soft palate:  ( N  ) palatal torus, <<No pathology noted>>           tongue/FOM <<No pathology noted>>           labial/buccal mucosa <<No pathology noted>>           ( N/A  ) percussion           ( N/A ) palpation           ( -  ) swelling            ( -  ) abscess           ( -  ) sinus tract    *DENTAL RADIOGRAPHS:    RADIOLOGY & ADDITIONAL STUDIES:    *ASSESSMENT: Socket for tooth#3 is healing properly and stiches present no bleeding at the moment of exam.   Tooth #12 is part of a bridge with crowns splinted from to #14. Surgical flap incision visible on the buccal side above #12, the area is healing normal and no bleeding at the moment of exam. No swelling present, patient denies difficulty swallowing/breathing, fever or chills.            *PLAN: to place sterile gauze pads on the socket of #3 and the buccal vestibule of #12. Patient explained to bite on the gauze pads for 40 minutes to one hour and repeat if necessary.     PROCEDURE:   Verbal and written consent given.  Anesthesia: << N/A   >>   Treatment: <<  hemostatic pressure with gauze pads  >>     RECOMMENDATIONS:  1) Bite firmly on gauze for 40 minutes to one hour and repeat if necessary.  2) Avoid spiting or rinsing for at least a day  3) Do not suck through a straw for one week  4) For pain or discomfort take Tylenol    5) Dental F/U with outpatient surgeon.   6) If any difficulty swallowing/breathing, fever occur, return to ER.     Resident Name: David Still pager #[phone]

## 2022-10-08 NOTE — ED ADULT TRIAGE NOTE - CHIEF COMPLAINT QUOTE
pt had dental surgery on tuesday, pt feels the stitches came out, bleeding from his mouth from surgical site. pt is on plavix.

## 2022-10-08 NOTE — ED PROVIDER NOTE - MDM ORDERS SUBMITTED SELECTION
Patient (s) given copy of dc instructions and script(s). Patient (s) verbalized understanding of instructions and script (s). Patient given a current medication reconciliation form and verbalized understanding of their medications. Patient (s) verbalized understanding of the importance of discussing medications with  his or her physician or clinic they will be following up with. Patient alert and oriented and in no acute distress. Patient discharged home ambulatory with self. Not Applicable

## 2022-10-08 NOTE — ED PROVIDER NOTE - PATIENT PORTAL LINK FT
You can access the FollowMyHealth Patient Portal offered by [facility] by registering at the following website: [email or web address]. By joining [facility]’s FollowMyHealth portal, you will also be able to view your health information using other applications (apps) compatible with our system.

## 2022-10-08 NOTE — ED PROVIDER NOTE - NSFOLLOWUPINSTRUCTIONS_ED_ALL_ED_FT
Please follow up with your Dental Surgeon outpatient.     1) Bite firmly on gauze for 40 minutes to one hour and repeat if necessary.  2) Avoid spiting or rinsing for at least a day  3) Do not suck through a straw for one week  4) For pain or discomfort take Tylenol     5) If any difficulty swallowing/breathing, fever occur, return to ER.

## 2022-10-08 NOTE — ED PROVIDER NOTE - PHYSICAL EXAMINATION
Physical Exam    Constitutional: No acute distress.   Eyes: Conjunctiva pink, Sclera clear, PERRLA, EOMI.  ENT: No sinus tenderness. No nasal discharge. No active bleeding inside mouth/gums. Stitches noted surrounding teeth #12 and #3.   Cardiovascular: Regular rate, regular rhythm. No noted murmurs rubs or gallops.  Respiratory: unlabored respiratory effort, clear to auscultation bilaterally no wheezing, rales or rhonchi  Gastrointestinal: Normal bowel sounds. soft, non distended, non-tender abdomen.   Musculoskeletal: supple neck, no midline tenderness. No joint or bony deformity.   Integumentary: warm, dry, no rash  Neurologic: awake, alert, cranial nerves II-XII grossly intact, extremities’ motor and sensory functions grossly intact  Psychiatric: appropriate mood, appropriate affect

## 2022-10-08 NOTE — ED PROVIDER NOTE - OBJECTIVE STATEMENT
71M with PMH of CVA on plavix, partial seizures (follows with Dr. Bullock), ADAIR, DM, DLD, BPH, gait disorder (mainly mechanical) presents to the ED with dental bleeding. Patient states that he underwent extraction of tooth #3 and #12 on 10/04 and today around 4pm experienced bleeding of left upper jaw around tooth #12. Patient states that he thinks he saw some stitches in his bloody sputum but is not completely sure. At this time bleeding is controlled with gauze pads. Denies dizziness, lightheadedness, shortness of breath, chest pain, dental pain.

## 2022-10-08 NOTE — ED PROVIDER NOTE - NS ED ROS FT
Constitutional: (-) fever  Eyes/ENT: (-) blurry vision, (-) epistaxis (+) dental bleeding  Cardiovascular: (-) chest pain, (-) syncope  Respiratory: (-) cough, (-) shortness of breath  Gastrointestinal: (-) vomiting, (-) diarrhea  Musculoskeletal: (-) neck pain, (-) back pain, (-) joint pain  Integumentary: (-) rash, (-) edema  Neurological: (-) headache, (-) altered mental status  Psychiatric: (-) hallucinations  Allergic/Immunologic: (-) pruritus

## 2022-10-08 NOTE — ED PROVIDER NOTE - ATTENDING APP SHARED VISIT CONTRIBUTION OF CARE
Patient had recent dental surgery, here c/o bleeding from the surgical site, after he bit on the sandwich and started chewing. Denies any other form of trauma. Denies any other form of bleeding. Denies f/c/n/v/cp/sob.   Vitals reviewed.   Face: no facial droop, no swelling.   oral cavity: +dried blood on the gums are with sutures, no active bleeding noted.   supple neck,   Lungs: CTA, no wheezing, no crackles.  A/P: Gum bleeding, stopped,   Dental consult,   reevaluation.

## 2022-10-12 ENCOUNTER — APPOINTMENT (OUTPATIENT)
Dept: UROLOGY | Facility: CLINIC | Age: 72
End: 2022-10-12

## 2022-10-12 PROCEDURE — 64566 NEUROELTRD STIM POST TIBIAL: CPT

## 2022-10-17 ENCOUNTER — APPOINTMENT (OUTPATIENT)
Dept: OTOLARYNGOLOGY | Facility: CLINIC | Age: 72
End: 2022-10-17

## 2022-10-17 VITALS — HEIGHT: 66 IN | WEIGHT: 230 LBS | BODY MASS INDEX: 36.96 KG/M2

## 2022-10-17 PROCEDURE — 31575 DIAGNOSTIC LARYNGOSCOPY: CPT

## 2022-10-17 PROCEDURE — 99204 OFFICE O/P NEW MOD 45 MIN: CPT | Mod: 25

## 2022-10-17 NOTE — PROCEDURE
[None] : none [Flexible Endoscope] : examined with the flexible endoscope [de-identified] : Flexible laryngoscopy performed. Nasal cavity, nasopharynx, oropharynx, hypopharynx, and larynx evaluated. No masses or lesions, bilateral true vocal folds symmetric and mobile.\par

## 2022-10-17 NOTE — HISTORY OF PRESENT ILLNESS
[de-identified] : Patient presents today c/o Sleep apnea. Patients most recent sleep study was performed on June 2022 (AHI 60). Patient was diagnosed with sleep apnea about 5 years ago. Patient has been using CPAP machine but has been unable to tolerate it because it is uncomfortable to sleep with and it makes a whistling noise. Patient is interested in Inspire device.\par \par Has been trailing CPAP for 5 years. Thinks the quality of his sleep is ok, has prostate issues and up almost hourly.\par \par PMH: ADAIR, seizure disorder, T2DM, stroke 5-6 years ago (uses walker), HTN\par \par Prior T&A as a child

## 2022-10-17 NOTE — ASSESSMENT
[FreeTextEntry1] : Discussed ADAIR and its effects on cardiovascular and metabolic health\par Discussed options for treatment including gold standard of CPAP and option of surgical salvage including upper airway stimulation\par Disks risks and rationale of UAS (Inspire implant)\par Plan for drug-induced sleep endoscopy\par Current BMI 37.5, discussed weight loss

## 2022-10-20 ENCOUNTER — APPOINTMENT (OUTPATIENT)
Age: 72
End: 2022-10-20

## 2022-11-08 ENCOUNTER — RESULT REVIEW (OUTPATIENT)
Age: 72
End: 2022-11-08

## 2022-11-08 ENCOUNTER — OUTPATIENT (OUTPATIENT)
Dept: OUTPATIENT SERVICES | Facility: HOSPITAL | Age: 72
LOS: 1 days | Discharge: HOME | End: 2022-11-08

## 2022-11-08 VITALS
OXYGEN SATURATION: 98 % | DIASTOLIC BLOOD PRESSURE: 90 MMHG | TEMPERATURE: 97 F | WEIGHT: 203.93 LBS | HEIGHT: 66 IN | SYSTOLIC BLOOD PRESSURE: 180 MMHG | HEART RATE: 55 BPM | RESPIRATION RATE: 17 BRPM

## 2022-11-08 DIAGNOSIS — Z98.890 OTHER SPECIFIED POSTPROCEDURAL STATES: Chronic | ICD-10-CM

## 2022-11-08 DIAGNOSIS — Z01.818 ENCOUNTER FOR OTHER PREPROCEDURAL EXAMINATION: ICD-10-CM

## 2022-11-08 DIAGNOSIS — H26.9 UNSPECIFIED CATARACT: Chronic | ICD-10-CM

## 2022-11-08 DIAGNOSIS — G47.33 OBSTRUCTIVE SLEEP APNEA (ADULT) (PEDIATRIC): ICD-10-CM

## 2022-11-08 LAB
A1C WITH ESTIMATED AVERAGE GLUCOSE RESULT: 6.2 % — HIGH (ref 4–5.6)
ALBUMIN SERPL ELPH-MCNC: 4.4 G/DL — SIGNIFICANT CHANGE UP (ref 3.5–5.2)
ALP SERPL-CCNC: 65 U/L — SIGNIFICANT CHANGE UP (ref 30–115)
ALT FLD-CCNC: 16 U/L — SIGNIFICANT CHANGE UP (ref 0–41)
ANION GAP SERPL CALC-SCNC: 11 MMOL/L — SIGNIFICANT CHANGE UP (ref 7–14)
APTT BLD: 30 SEC — SIGNIFICANT CHANGE UP (ref 27–39.2)
AST SERPL-CCNC: 17 U/L — SIGNIFICANT CHANGE UP (ref 0–41)
BASOPHILS # BLD AUTO: 0.07 K/UL — SIGNIFICANT CHANGE UP (ref 0–0.2)
BASOPHILS NFR BLD AUTO: 0.9 % — SIGNIFICANT CHANGE UP (ref 0–1)
BILIRUB SERPL-MCNC: 0.4 MG/DL — SIGNIFICANT CHANGE UP (ref 0.2–1.2)
BUN SERPL-MCNC: 16 MG/DL — SIGNIFICANT CHANGE UP (ref 10–20)
CALCIUM SERPL-MCNC: 9.1 MG/DL — SIGNIFICANT CHANGE UP (ref 8.4–10.5)
CHLORIDE SERPL-SCNC: 100 MMOL/L — SIGNIFICANT CHANGE UP (ref 98–110)
CO2 SERPL-SCNC: 29 MMOL/L — SIGNIFICANT CHANGE UP (ref 17–32)
CREAT SERPL-MCNC: 0.8 MG/DL — SIGNIFICANT CHANGE UP (ref 0.7–1.5)
EGFR: 95 ML/MIN/1.73M2 — SIGNIFICANT CHANGE UP
EOSINOPHIL # BLD AUTO: 0.34 K/UL — SIGNIFICANT CHANGE UP (ref 0–0.7)
EOSINOPHIL NFR BLD AUTO: 4.4 % — SIGNIFICANT CHANGE UP (ref 0–8)
ESTIMATED AVERAGE GLUCOSE: 131 MG/DL — HIGH (ref 68–114)
GLUCOSE SERPL-MCNC: 87 MG/DL — SIGNIFICANT CHANGE UP (ref 70–99)
HCT VFR BLD CALC: 44.4 % — SIGNIFICANT CHANGE UP (ref 42–52)
HGB BLD-MCNC: 15.2 G/DL — SIGNIFICANT CHANGE UP (ref 14–18)
IMM GRANULOCYTES NFR BLD AUTO: 0.3 % — SIGNIFICANT CHANGE UP (ref 0.1–0.3)
INR BLD: 1.03 RATIO — SIGNIFICANT CHANGE UP (ref 0.65–1.3)
LYMPHOCYTES # BLD AUTO: 2.17 K/UL — SIGNIFICANT CHANGE UP (ref 1.2–3.4)
LYMPHOCYTES # BLD AUTO: 27.8 % — SIGNIFICANT CHANGE UP (ref 20.5–51.1)
MCHC RBC-ENTMCNC: 31.6 PG — HIGH (ref 27–31)
MCHC RBC-ENTMCNC: 34.2 G/DL — SIGNIFICANT CHANGE UP (ref 32–37)
MCV RBC AUTO: 92.3 FL — SIGNIFICANT CHANGE UP (ref 80–94)
MONOCYTES # BLD AUTO: 0.94 K/UL — HIGH (ref 0.1–0.6)
MONOCYTES NFR BLD AUTO: 12 % — HIGH (ref 1.7–9.3)
NEUTROPHILS # BLD AUTO: 4.27 K/UL — SIGNIFICANT CHANGE UP (ref 1.4–6.5)
NEUTROPHILS NFR BLD AUTO: 54.6 % — SIGNIFICANT CHANGE UP (ref 42.2–75.2)
NRBC # BLD: 0 /100 WBCS — SIGNIFICANT CHANGE UP (ref 0–0)
PLATELET # BLD AUTO: 160 K/UL — SIGNIFICANT CHANGE UP (ref 130–400)
POTASSIUM SERPL-MCNC: 4.2 MMOL/L — SIGNIFICANT CHANGE UP (ref 3.5–5)
POTASSIUM SERPL-SCNC: 4.2 MMOL/L — SIGNIFICANT CHANGE UP (ref 3.5–5)
PROT SERPL-MCNC: 7.1 G/DL — SIGNIFICANT CHANGE UP (ref 6–8)
PROTHROM AB SERPL-ACNC: 11.8 SEC — SIGNIFICANT CHANGE UP (ref 9.95–12.87)
RBC # BLD: 4.81 M/UL — SIGNIFICANT CHANGE UP (ref 4.7–6.1)
RBC # FLD: 12.5 % — SIGNIFICANT CHANGE UP (ref 11.5–14.5)
SODIUM SERPL-SCNC: 140 MMOL/L — SIGNIFICANT CHANGE UP (ref 135–146)
WBC # BLD: 7.81 K/UL — SIGNIFICANT CHANGE UP (ref 4.8–10.8)
WBC # FLD AUTO: 7.81 K/UL — SIGNIFICANT CHANGE UP (ref 4.8–10.8)

## 2022-11-08 PROCEDURE — 93010 ELECTROCARDIOGRAM REPORT: CPT

## 2022-11-08 PROCEDURE — 71046 X-RAY EXAM CHEST 2 VIEWS: CPT | Mod: 26

## 2022-11-08 RX ORDER — OXYBUTYNIN CHLORIDE 5 MG
1 TABLET ORAL
Qty: 0 | Refills: 0 | DISCHARGE

## 2022-11-08 NOTE — H&P PST ADULT - NSICDXFAMILYHX_GEN_ALL_CORE_FT
FAMILY HISTORY:  Father  Still living? No  FH: stomach cancer, Age at diagnosis: Age Unknown    Mother  Still living? No  FH: CAD (coronary artery disease), Age at diagnosis: Age Unknown

## 2022-11-08 NOTE — H&P PST ADULT - REASON FOR ADMISSION
71 Y/O MALE HERE FOR PRE-ADMISSION SURGICAL TESTING. PATIENT REPORTS HE WAS DIAGNOSED WITH ADAIR APPROX 5 YRS AGO. CPAP IS UNCOMFORTABLE.   NOW FOR SCHEDULED DRUG INDUCED SLEEP ENDOSCOPY

## 2022-11-08 NOTE — H&P PST ADULT - HISTORY OF PRESENT ILLNESS
PATIENT DENIES CHEST PAIN, SHORTNESS OF BREATH, PALPITATIONS, COUGHING, FEVER, DYSURIA.  CAN WALK UP 1-2FLIGHTS OF STEPS WITHOUT SOB, ONLY LIMITED TO FATIGUE.    NO COUGH, FEVER, SORE THROAT, HEADACHE, LOSS OF TASTE OR SMELL. NO KNOWN EXPOSURE TO ANYONE WITH COVID. PATIENT WAS INSTRUCTED TO ISOLATE FROM NOW UNTIL THE SURGERY.    Anesthesia Alert  + Difficult Airway (CLASS IV)  NO--History of neck surgery or radiation  NO--Limited ROM of neck  NO--History of Malignant hyperthermia  NO--Personal or family history of Pseudocholinesterase deficiency  NO--Prior Anesthesia Complication  NO--Latex Allergy  NO--Loose teeth  NO--History of Rheumatoid Arthritis  + ADAIR (CPAP)  + bleeding risk (ON PLAVIX)

## 2022-11-08 NOTE — H&P PST ADULT - NSICDXPASTMEDICALHX_GEN_ALL_CORE_FT
PAST MEDICAL HISTORY:  Anxiety     BPH (benign prostatic hyperplasia)     Depression NO SUICIDAL THOUGHTS    Hypercholesteremia     Hypertension, unspecified type     Neuropathy     Obese BMI=32.9    ADAIR on CPAP     Overactive bladder     Seizures LAST SEIZURE 2016    TIA (transient ischemic attack) 2009    Type 2 diabetes mellitus with complication, without long-term current use of insulin

## 2022-11-08 NOTE — H&P PST ADULT - ALCOHOL USE HISTORY SINGLE SELECT
[FreeTextEntry1] : BARIATRIC SURGERY HISTORY: RYGB (max weight 390)\par \par OBESITY COMORBIDITIES: DM2 (now back off of insulin), metabolic syndrome, AUGUSTINE\par \par ANTI-OBESITY MEDICATIONS: off and on GLP-1 agonist\par \par OBESITY MEDICATION SIDE EFFECTS: none\par \par cont whole foods, lower fat, high fiber diet\par needs to find daily exercise regimen, add home exercise (youtube based)? to walking\par cont trulicity to 1.5mg/weekly\par \par \par rtc in 3 weeks\par \par \par  never

## 2022-11-08 NOTE — H&P PST ADULT - NSICDXPASTSURGICALHX_GEN_ALL_CORE_FT
PAST SURGICAL HISTORY:  Capsular cataract of right eye     S/P ORIF (open reduction internal fixation) fracture LEFT TIB-FIB

## 2022-11-09 ENCOUNTER — APPOINTMENT (OUTPATIENT)
Dept: UROLOGY | Facility: CLINIC | Age: 72
End: 2022-11-09

## 2022-11-09 ENCOUNTER — EMERGENCY (EMERGENCY)
Facility: HOSPITAL | Age: 72
LOS: 0 days | Discharge: HOME | End: 2022-11-09
Attending: EMERGENCY MEDICINE | Admitting: EMERGENCY MEDICINE

## 2022-11-09 VITALS
HEIGHT: 66 IN | OXYGEN SATURATION: 98 % | RESPIRATION RATE: 18 BRPM | HEART RATE: 50 BPM | WEIGHT: 214.07 LBS | TEMPERATURE: 98 F | DIASTOLIC BLOOD PRESSURE: 82 MMHG | SYSTOLIC BLOOD PRESSURE: 136 MMHG

## 2022-11-09 VITALS
SYSTOLIC BLOOD PRESSURE: 153 MMHG | DIASTOLIC BLOOD PRESSURE: 76 MMHG | RESPIRATION RATE: 20 BRPM | OXYGEN SATURATION: 99 % | HEART RATE: 69 BPM | TEMPERATURE: 98 F

## 2022-11-09 VITALS
SYSTOLIC BLOOD PRESSURE: 82 MMHG | HEART RATE: 41 BPM | HEIGHT: 66 IN | BODY MASS INDEX: 33.75 KG/M2 | WEIGHT: 210 LBS | DIASTOLIC BLOOD PRESSURE: 51 MMHG

## 2022-11-09 DIAGNOSIS — I95.9 HYPOTENSION, UNSPECIFIED: ICD-10-CM

## 2022-11-09 DIAGNOSIS — M48.061 SPINAL STENOSIS, LUMBAR REGION WITHOUT NEUROGENIC CLAUDICATION: ICD-10-CM

## 2022-11-09 DIAGNOSIS — Z98.890 OTHER SPECIFIED POSTPROCEDURAL STATES: Chronic | ICD-10-CM

## 2022-11-09 DIAGNOSIS — R06.02 SHORTNESS OF BREATH: ICD-10-CM

## 2022-11-09 DIAGNOSIS — Z79.02 LONG TERM (CURRENT) USE OF ANTITHROMBOTICS/ANTIPLATELETS: ICD-10-CM

## 2022-11-09 DIAGNOSIS — Z79.84 LONG TERM (CURRENT) USE OF ORAL HYPOGLYCEMIC DRUGS: ICD-10-CM

## 2022-11-09 DIAGNOSIS — E11.40 TYPE 2 DIABETES MELLITUS WITH DIABETIC NEUROPATHY, UNSPECIFIED: ICD-10-CM

## 2022-11-09 DIAGNOSIS — G47.33 OBSTRUCTIVE SLEEP APNEA (ADULT) (PEDIATRIC): ICD-10-CM

## 2022-11-09 DIAGNOSIS — F39 UNSPECIFIED MOOD [AFFECTIVE] DISORDER: ICD-10-CM

## 2022-11-09 DIAGNOSIS — R42 DIZZINESS AND GIDDINESS: ICD-10-CM

## 2022-11-09 DIAGNOSIS — F41.9 ANXIETY DISORDER, UNSPECIFIED: ICD-10-CM

## 2022-11-09 DIAGNOSIS — G40.909 EPILEPSY, UNSPECIFIED, NOT INTRACTABLE, WITHOUT STATUS EPILEPTICUS: ICD-10-CM

## 2022-11-09 DIAGNOSIS — Z79.4 LONG TERM (CURRENT) USE OF INSULIN: ICD-10-CM

## 2022-11-09 DIAGNOSIS — E78.00 PURE HYPERCHOLESTEROLEMIA, UNSPECIFIED: ICD-10-CM

## 2022-11-09 DIAGNOSIS — N40.0 BENIGN PROSTATIC HYPERPLASIA WITHOUT LOWER URINARY TRACT SYMPTOMS: ICD-10-CM

## 2022-11-09 DIAGNOSIS — E11.59 TYPE 2 DIABETES MELLITUS WITH OTHER CIRCULATORY COMPLICATIONS: ICD-10-CM

## 2022-11-09 DIAGNOSIS — I10 ESSENTIAL (PRIMARY) HYPERTENSION: ICD-10-CM

## 2022-11-09 DIAGNOSIS — H26.9 UNSPECIFIED CATARACT: Chronic | ICD-10-CM

## 2022-11-09 DIAGNOSIS — Z86.73 PERSONAL HISTORY OF TRANSIENT ISCHEMIC ATTACK (TIA), AND CEREBRAL INFARCTION WITHOUT RESIDUAL DEFICITS: ICD-10-CM

## 2022-11-09 DIAGNOSIS — Z99.89 DEPENDENCE ON OTHER ENABLING MACHINES AND DEVICES: ICD-10-CM

## 2022-11-09 PROBLEM — N32.81 OVERACTIVE BLADDER: Chronic | Status: ACTIVE | Noted: 2022-11-08

## 2022-11-09 LAB
ALBUMIN SERPL ELPH-MCNC: 4.3 G/DL — SIGNIFICANT CHANGE UP (ref 3.5–5.2)
ALP SERPL-CCNC: 62 U/L — SIGNIFICANT CHANGE UP (ref 30–115)
ALT FLD-CCNC: 15 U/L — SIGNIFICANT CHANGE UP (ref 0–41)
ANION GAP SERPL CALC-SCNC: 9 MMOL/L — SIGNIFICANT CHANGE UP (ref 7–14)
AST SERPL-CCNC: 16 U/L — SIGNIFICANT CHANGE UP (ref 0–41)
BASOPHILS # BLD AUTO: 0.04 K/UL — SIGNIFICANT CHANGE UP (ref 0–0.2)
BASOPHILS NFR BLD AUTO: 0.4 % — SIGNIFICANT CHANGE UP (ref 0–1)
BILIRUB SERPL-MCNC: 0.4 MG/DL — SIGNIFICANT CHANGE UP (ref 0.2–1.2)
BUN SERPL-MCNC: 18 MG/DL — SIGNIFICANT CHANGE UP (ref 10–20)
CALCIUM SERPL-MCNC: 8.9 MG/DL — SIGNIFICANT CHANGE UP (ref 8.4–10.5)
CHLORIDE SERPL-SCNC: 100 MMOL/L — SIGNIFICANT CHANGE UP (ref 98–110)
CO2 SERPL-SCNC: 27 MMOL/L — SIGNIFICANT CHANGE UP (ref 17–32)
CREAT SERPL-MCNC: 1 MG/DL — SIGNIFICANT CHANGE UP (ref 0.7–1.5)
EGFR: 80 ML/MIN/1.73M2 — SIGNIFICANT CHANGE UP
EOSINOPHIL # BLD AUTO: 0.03 K/UL — SIGNIFICANT CHANGE UP (ref 0–0.7)
EOSINOPHIL NFR BLD AUTO: 0.3 % — SIGNIFICANT CHANGE UP (ref 0–8)
GLUCOSE SERPL-MCNC: 148 MG/DL — HIGH (ref 70–99)
HCT VFR BLD CALC: 45.7 % — SIGNIFICANT CHANGE UP (ref 42–52)
HGB BLD-MCNC: 15.5 G/DL — SIGNIFICANT CHANGE UP (ref 14–18)
IMM GRANULOCYTES NFR BLD AUTO: 0.3 % — SIGNIFICANT CHANGE UP (ref 0.1–0.3)
LYMPHOCYTES # BLD AUTO: 1.08 K/UL — LOW (ref 1.2–3.4)
LYMPHOCYTES # BLD AUTO: 12 % — LOW (ref 20.5–51.1)
MAGNESIUM SERPL-MCNC: 1.8 MG/DL — SIGNIFICANT CHANGE UP (ref 1.8–2.4)
MCHC RBC-ENTMCNC: 31.8 PG — HIGH (ref 27–31)
MCHC RBC-ENTMCNC: 33.9 G/DL — SIGNIFICANT CHANGE UP (ref 32–37)
MCV RBC AUTO: 93.6 FL — SIGNIFICANT CHANGE UP (ref 80–94)
MONOCYTES # BLD AUTO: 0.66 K/UL — HIGH (ref 0.1–0.6)
MONOCYTES NFR BLD AUTO: 7.4 % — SIGNIFICANT CHANGE UP (ref 1.7–9.3)
NEUTROPHILS # BLD AUTO: 7.13 K/UL — HIGH (ref 1.4–6.5)
NEUTROPHILS NFR BLD AUTO: 79.6 % — HIGH (ref 42.2–75.2)
NRBC # BLD: 0 /100 WBCS — SIGNIFICANT CHANGE UP (ref 0–0)
NT-PROBNP SERPL-SCNC: 578 PG/ML — HIGH (ref 0–300)
PLATELET # BLD AUTO: 160 K/UL — SIGNIFICANT CHANGE UP (ref 130–400)
POTASSIUM SERPL-MCNC: 4.6 MMOL/L — SIGNIFICANT CHANGE UP (ref 3.5–5)
POTASSIUM SERPL-SCNC: 4.6 MMOL/L — SIGNIFICANT CHANGE UP (ref 3.5–5)
PROT SERPL-MCNC: 6.5 G/DL — SIGNIFICANT CHANGE UP (ref 6–8)
RBC # BLD: 4.88 M/UL — SIGNIFICANT CHANGE UP (ref 4.7–6.1)
RBC # FLD: 12.5 % — SIGNIFICANT CHANGE UP (ref 11.5–14.5)
SODIUM SERPL-SCNC: 136 MMOL/L — SIGNIFICANT CHANGE UP (ref 135–146)
TROPONIN T SERPL-MCNC: <0.01 NG/ML — SIGNIFICANT CHANGE UP
WBC # BLD: 8.97 K/UL — SIGNIFICANT CHANGE UP (ref 4.8–10.8)
WBC # FLD AUTO: 8.97 K/UL — SIGNIFICANT CHANGE UP (ref 4.8–10.8)

## 2022-11-09 PROCEDURE — 71045 X-RAY EXAM CHEST 1 VIEW: CPT | Mod: 26

## 2022-11-09 PROCEDURE — 93010 ELECTROCARDIOGRAM REPORT: CPT

## 2022-11-09 PROCEDURE — 99285 EMERGENCY DEPT VISIT HI MDM: CPT

## 2022-11-09 PROCEDURE — 99213 OFFICE O/P EST LOW 20 MIN: CPT

## 2022-11-09 RX ORDER — LANCETS 33 GAUGE
EACH MISCELLANEOUS
Qty: 100 | Refills: 0 | Status: ACTIVE | COMMUNITY
Start: 2022-09-07

## 2022-11-09 RX ORDER — BLOOD SUGAR DIAGNOSTIC
STRIP MISCELLANEOUS
Qty: 100 | Refills: 0 | Status: ACTIVE | COMMUNITY
Start: 2022-09-07

## 2022-11-09 RX ORDER — BLOOD-GLUCOSE METER
W/DEVICE EACH MISCELLANEOUS
Qty: 1 | Refills: 0 | Status: ACTIVE | COMMUNITY
Start: 2022-08-31

## 2022-11-09 RX ORDER — SODIUM CHLORIDE 9 MG/ML
1000 INJECTION INTRAMUSCULAR; INTRAVENOUS; SUBCUTANEOUS ONCE
Refills: 0 | Status: COMPLETED | OUTPATIENT
Start: 2022-11-09 | End: 2022-11-09

## 2022-11-09 RX ADMIN — SODIUM CHLORIDE 1000 MILLILITER(S): 9 INJECTION INTRAMUSCULAR; INTRAVENOUS; SUBCUTANEOUS at 19:35

## 2022-11-09 RX ADMIN — SODIUM CHLORIDE 1000 MILLILITER(S): 9 INJECTION INTRAMUSCULAR; INTRAVENOUS; SUBCUTANEOUS at 16:14

## 2022-11-09 NOTE — ED PROVIDER NOTE - ATTENDING APP SHARED VISIT CONTRIBUTION OF CARE
72-year-old male with history of hypertension dyslipidemia diabetes TIA BPH here for valuation of lightheadedness.  Patient while waiting urologist office felt lightheaded EMS called and patient was found to be hypotensive.  Patient as well as son reports that over the week patient has had his blood pressure medication adjusted twice initially patient was advised to take less medication and then when they went to PMDs office of blood pressure high advised to take additional blood pressure medication.  Here on exam patient is slightly tired appearing brother is well-appearing S1-S2 clear to auscultation belly soft nontender motor 5/5  x 4 patient is awake alert following commands.  Impression  Patient here with an episode of lightheadedness.  Initially the patient was hypotensive in the field but here normotensive.  After IV hydration the patient felt improved and ambulated in ED.  I discussed with patient and son at bedside about the over titration of the patient's blood pressure might be the cause of the symptoms.  They will follow-up as an outpatient.  Lastly the initial EKG had a mildly prolonged QTC with a bundle branch block likely contributing to that.  Repeat EKG with a QTC less than 500.  Patient stable for discharge.

## 2022-11-09 NOTE — END OF VISIT
[FreeTextEntry3] : I was immediately available, discussed fully with nurse practitioner, made recommendations and agree with the above transcription by nurse practitioner

## 2022-11-09 NOTE — ED PROVIDER NOTE - CLINICAL SUMMARY MEDICAL DECISION MAKING FREE TEXT BOX
Patient here with an episode of lightheadedness.  Initially the patient was hypotensive in the field but here normotensive.  After IV hydration the patient felt improved and ambulated in ED.  I discussed with patient and son at bedside about the over titration of the patient's blood pressure might be the cause of the symptoms.  They will follow-up as an outpatient.  Lastly the initial EKG had a mildly prolonged QTC with a bundle branch block likely contributing to that.  Repeat EKG with a QTC less than 500.  Patient stable for discharge.

## 2022-11-09 NOTE — ED PROVIDER NOTE - NSFOLLOWUPINSTRUCTIONS_ED_ALL_ED_FT
Please follow up with your cardiologist and primary care physician within 24-72 hours and return immediately if symptoms worsen.    Hypotension  As your heart beats, it forces blood through your body. Hypotension, commonly called low blood pressure, is when the force of blood pumping through your arteries is too weak. Arteries are blood vessels that carry blood from the heart throughout the body. Depending on the cause and severity, hypotension may be harmless (benign) or may cause serious problems (be critical).  When blood pressure is too low, you may not get enough blood to your brain or to the rest of your organs. This can cause weakness, light-headedness, rapid heartbeat, and fainting.  What are the causes?  This condition may be caused by:  Blood loss.Loss of body fluids (dehydration).Heart problems.Hormone (endocrine) problems.Pregnancy.Severe infection.Lack of certain nutrients.Severe allergic reactions (anaphylaxis).Certain medicines, such as blood pressure medicine or medicines that make the body lose excess fluids (diuretics). Sometimes, hypotension may be caused by not taking medicine as directed, such as taking too much of a certain medicine.What increases the risk?  The following factors may make you more likely to develop this condition:  Age. Risk increases as you get older.Conditions that affect the heart or the central nervous system.Taking certain medicines, such as blood pressure medicine or diuretics.Being pregnant.What are the signs or symptoms?  Common symptoms of this condition include:  Weakness.Light-headedness.Dizziness.Blurred vision.Fatigue.Rapid heartbeat.Fainting, in severe cases.How is this diagnosed?  This condition is diagnosed based on:  Your medical history.Your symptoms.Your blood pressure measurement. Your health care provider will check your blood pressure when you are:  Lying down.Sitting.Standing.A blood pressure reading is recorded as two numbers, such as "120 over 80" (or 120/80). The first ("top") number is called the systolic pressure. It is a measure of the pressure in your arteries as your heart beats. The second ("bottom") number is called the diastolic pressure. It is a measure of the pressure in your arteries when your heart relaxes between beats. Blood pressure is measured in a unit called mm Hg. Healthy blood pressure for most adults is 120/80. If your blood pressure is below 90/60, you may be diagnosed with hypotension.  Other information or tests that may be used to diagnose hypotension include:  Your other vital signs, such as your heart rate and temperature.Blood tests.Tilt table test. For this test, you will be safely secured to a table that moves you from a lying position to an upright position. Your heart rhythm and blood pressure will be monitored during the test.How is this treated?  Treatment for this condition may include:  Changing your diet. This may involve eating more salt (sodium) or drinking more water.Taking medicines to raise your blood pressure.Changing the dosage of certain medicines you are taking that might be lowering your blood pressure.Wearing compression stockings. These stockings help to prevent blood clots and reduce swelling in your legs.In some cases, you may need to go to the hospital for:  Fluid replacement. This means you will receive fluids through an IV.Blood replacement. This means you will receive donated blood through an IV (transfusion).Treating an infection or heart problems, if this applies.Monitoring. You may need to be monitored while medicines that you are taking wear off.Follow these instructions at home:  Eating and drinking        Drink enough fluid to keep your urine pale yellow.Eat a healthy diet, and follow instructions from your health care provider about eating or drinking restrictions. A healthy diet includes:  Fresh fruits and vegetables.Whole grains.Lean meats.Low-fat dairy products.Eat extra salt only as directed. Do not add extra salt to your diet unless your health care provider told you to do that.Eat frequent, small meals.Avoid standing up suddenly after eating.Medicines     Take over-the-counter and prescription medicines only as told by your health care provider.  Follow instructions from your health care provider about changing the dosage of your current medicines, if this applies.Do not stop or adjust any of your medicines on your own.General instructions        Wear compression stockings as told by your health care provider.Get up slowly from lying down or sitting positions. This gives your blood pressure a chance to adjust.Avoid hot showers and excessive heat as directed by your health care provider.Return to your normal activities as told by your health care provider. Ask your health care provider what activities are safe for you.Do not use any products that contain nicotine or tobacco, such as cigarettes, e-cigarettes, and chewing tobacco. If you need help quitting, ask your health care provider.Keep all follow-up visits as told by your health care provider. This is important.Contact a health care provider if you:  Vomit.Have diarrhea.Have a fever for more than 2–3 days.Feel more thirsty than usual.Feel weak and tired.Get help right away if you:  Have chest pain.Have a fast or irregular heartbeat.Develop numbness in any part of your body.Cannot move your arms or your legs.Have trouble speaking.Become sweaty or feel light-headed.Faint.Feel short of breath.Have trouble staying awake.Feel confused.Summary  Hypotension is when the force of blood pumping through your arteries is too weak.Hypotension may be harmless (benign) or may cause serious problems (be critical).Treatment for this condition may include changing your diet, changing your medicines, and wearing compression stockings.In some cases, you may need to go to the hospital for fluid or blood replacement.This information is not intended to replace advice given to you by your health care provider. Make sure you discuss any questions you have with your health care provider.

## 2022-11-09 NOTE — ED PROVIDER NOTE - PATIENT PORTAL LINK FT
You can access the FollowMyHealth Patient Portal offered by [facility] by registering at the following website: [email or web address]. By joining [employer]’s FollowMyHealth portal, you will also be able to view your health information using other applications (apps) compatible with our system.

## 2022-11-09 NOTE — HISTORY OF PRESENT ILLNESS
[FreeTextEntry1] : Mr Rowe is a 72 year old male with multiple medical issues who presented to the office today for a monthly PTNS. Patient felt dizzy and lightheaded when he was brought into the exam room. Vitals were taken and patient was found to be hypotensive and bradycardiac. He denied any chest pains, shortness of breath or pain but did appear pale and unsteady.

## 2022-11-09 NOTE — ED ADULT NURSE NOTE - SUICIDE SCREENING DEPRESSION
Patient consulted with Dr. Simone Corral, Cardiologist at [facility], who stated that patient needed a Hematology clearance before a TAVR procedure could be performed, due to low platelets. Please advise, thank you.    Negative

## 2022-11-09 NOTE — PHYSICAL EXAM
[Oriented To Time, Place, And Person] : oriented to person, place, and time [FreeTextEntry1] : unsteady

## 2022-11-09 NOTE — ED ADULT NURSE NOTE - NSIMPLEMENTINTERV_GEN_ALL_ED
Implemented All Fall with Harm Risk Interventions:  [place] to call system. Call bell, personal items and telephone within reach. Instruct patient to call for assistance. Room bathroom lighting operational. Non-slip footwear when patient is off stretcher. Physically safe environment: no spills, clutter or unnecessary equipment. Stretcher in lowest position, wheels locked, appropriate side rails in place. Provide visual cue, wrist band, yellow gown, etc. Monitor gait and stability. Monitor for mental status changes and reorient to person, place, and time. Review medications for side effects contributing to fall risk. Reinforce activity limits and safety measures with patient and family. Provide visual clues: red socks.

## 2022-11-09 NOTE — ED PROVIDER NOTE - OBJECTIVE STATEMENT
71 yo male with a pmh of HTN, HLD, DM, TIA, and bph presents c/o lightheadedness. pt states while waiting in the waiting room of his urologist office he felt lightheaded. pt states ems was called and he was found to be hypotensive. pt states over the week he has had changes to his hypertensive medications because he was noted to have low blood pressure in his cardiology office. pt denies any other symptoms including fevers, chill, headache, recent illness/travel, cough, abdominal pain, chest pain, or SOB.

## 2022-11-17 PROBLEM — E66.9 OBESITY, UNSPECIFIED: Chronic | Status: ACTIVE | Noted: 2022-11-08

## 2022-11-17 PROBLEM — F32.A DEPRESSION, UNSPECIFIED: Chronic | Status: ACTIVE | Noted: 2022-11-08

## 2022-11-17 PROBLEM — R56.9 UNSPECIFIED CONVULSIONS: Chronic | Status: ACTIVE | Noted: 2022-11-08

## 2022-11-17 PROBLEM — G45.9 TRANSIENT CEREBRAL ISCHEMIC ATTACK, UNSPECIFIED: Chronic | Status: ACTIVE | Noted: 2022-11-08

## 2022-11-29 ENCOUNTER — APPOINTMENT (OUTPATIENT)
Dept: OTOLARYNGOLOGY | Facility: AMBULATORY SURGERY CENTER | Age: 72
End: 2022-11-29

## 2022-11-30 ENCOUNTER — APPOINTMENT (OUTPATIENT)
Dept: UROLOGY | Facility: CLINIC | Age: 72
End: 2022-11-30

## 2022-11-30 VITALS
TEMPERATURE: 98 F | HEART RATE: 40 BPM | RESPIRATION RATE: 14 BRPM | HEIGHT: 66 IN | DIASTOLIC BLOOD PRESSURE: 85 MMHG | WEIGHT: 210 LBS | BODY MASS INDEX: 33.75 KG/M2 | SYSTOLIC BLOOD PRESSURE: 120 MMHG

## 2022-11-30 PROCEDURE — 99214 OFFICE O/P EST MOD 30 MIN: CPT

## 2022-11-30 NOTE — HISTORY OF PRESENT ILLNESS
[FreeTextEntry1] : 72-year-old with urinary frequency.  He feels as though his PTNS treatments are helping and wants to continue monthly treatments.  He also would like to adjust medication.  He is continuing on tamsulosin, finasteride, and oxybutynin ER 15.  He gets dizziness and his cardiologist would like medications adjusted that could cause dizziness.\par \par He does not have daytime frequency urgency but is bothered by nocturia x2-3.

## 2022-11-30 NOTE — ASSESSMENT
[FreeTextEntry1] : Will discontinue tamsulosin and maintain patient on finasteride.  Also recommend discontinuing oxybutynin ER and changed to Sanctura.  This will be less likely to cross the blood-brain barrier.  Resume PTNS.  Return to office with me in 3 months to reevaluate\par \par I discussed the multifactorial causes of urinary frequency.  We will maintain on a bladder relaxant but change to Sanctura

## 2022-12-27 ENCOUNTER — APPOINTMENT (OUTPATIENT)
Dept: UROLOGY | Facility: CLINIC | Age: 72
End: 2022-12-27

## 2022-12-27 ENCOUNTER — NON-APPOINTMENT (OUTPATIENT)
Age: 72
End: 2022-12-27

## 2022-12-29 ENCOUNTER — NON-APPOINTMENT (OUTPATIENT)
Age: 72
End: 2022-12-29

## 2023-01-06 ENCOUNTER — NON-APPOINTMENT (OUTPATIENT)
Age: 73
End: 2023-01-06

## 2023-01-11 ENCOUNTER — APPOINTMENT (OUTPATIENT)
Dept: UROLOGY | Facility: CLINIC | Age: 73
End: 2023-01-11
Payer: MEDICARE

## 2023-01-11 VITALS
SYSTOLIC BLOOD PRESSURE: 120 MMHG | HEIGHT: 66 IN | WEIGHT: 205 LBS | BODY MASS INDEX: 32.95 KG/M2 | DIASTOLIC BLOOD PRESSURE: 80 MMHG

## 2023-01-11 PROCEDURE — 64566 NEUROELTRD STIM POST TIBIAL: CPT

## 2023-01-16 ENCOUNTER — APPOINTMENT (OUTPATIENT)
Age: 73
End: 2023-01-16

## 2023-01-25 ENCOUNTER — RX RENEWAL (OUTPATIENT)
Age: 73
End: 2023-01-25

## 2023-01-31 ENCOUNTER — NON-APPOINTMENT (OUTPATIENT)
Age: 73
End: 2023-01-31

## 2023-01-31 RX ORDER — VIBEGRON 75 MG/1
75 TABLET, FILM COATED ORAL
Qty: 30 | Refills: 5 | Status: ACTIVE | COMMUNITY
Start: 2023-01-06 | End: 1900-01-01

## 2023-02-01 ENCOUNTER — NON-APPOINTMENT (OUTPATIENT)
Age: 73
End: 2023-02-01

## 2023-02-07 NOTE — PATIENT PROFILE ADULT - DO YOU FEEL LIKE HURTING YOURSELF OR OTHERS?
Problem: PHYSICAL THERAPY ADULT  Goal: Performs mobility at highest level of function for planned discharge setting  See evaluation for individualized goals  Description: Treatment/Interventions: Functional transfer training, LE strengthening/ROM, Therapeutic exercise, Elevations, Endurance training, Patient/family training, Equipment eval/education, Gait training, Bed mobility          See flowsheet documentation for full assessment, interventions and recommendations  Note: Prognosis: Fair  Problem List: Decreased strength, Decreased endurance, Impaired balance, Decreased mobility, Decreased coordination, Impaired judgement, Decreased safety awareness, Decreased cognition, Pain  Assessment: Pt seen for physical therapy evaluation, portion of which was performed as a co-evaluation w/ OT due to concerns re: medical stability  PT portion of evaluation focused on mobility assessment where OT portion focused more on ADLs, self care  Pt is a 68 y/o female w/ history/comorbidities of RA, OM, depression/anxiety, polio who is now admitted w  fall at home  Noted to have R shoulder chronic dislocation, ? foot infection  allowed to be WBAT and use RW per ortho  Due to acute medical issues, pain, fall risk, note unsatble clinical picture  PT consulted to assess mobility, d/c needs  Pt presents w/ decreased functional mob, standing balance, endurance, B LE sterength/coordination, barriers at home  Pt will benefit from skilled PT to correct for the above problems  Currently, would lean towards rehab as pt funtioning below mobility baseline  However, family member present for session- after eval, she noted pt would do better off of oxy and with her shoes  will follow up tomorrow to assess progress    Do anticipate family will refuse rehab- and if so, recommend as much support as possible w/ home therapy as well   will follow for needs        PT Discharge Recommendation: (S) Post acute rehabilitation services (see above for full recs)    See flowsheet documentation for full assessment  no

## 2023-02-08 ENCOUNTER — APPOINTMENT (OUTPATIENT)
Dept: UROLOGY | Facility: CLINIC | Age: 73
End: 2023-02-08
Payer: MEDICARE

## 2023-02-08 ENCOUNTER — APPOINTMENT (OUTPATIENT)
Dept: NEUROLOGY | Facility: CLINIC | Age: 73
End: 2023-02-08
Payer: MEDICARE

## 2023-02-08 ENCOUNTER — NON-APPOINTMENT (OUTPATIENT)
Age: 73
End: 2023-02-08

## 2023-02-08 VITALS
HEART RATE: 67 BPM | TEMPERATURE: 97.6 F | HEIGHT: 66 IN | SYSTOLIC BLOOD PRESSURE: 135 MMHG | BODY MASS INDEX: 33.43 KG/M2 | DIASTOLIC BLOOD PRESSURE: 70 MMHG | WEIGHT: 208 LBS | OXYGEN SATURATION: 99 %

## 2023-02-08 PROCEDURE — 64566 NEUROELTRD STIM POST TIBIAL: CPT

## 2023-02-08 PROCEDURE — 99214 OFFICE O/P EST MOD 30 MIN: CPT

## 2023-02-08 RX ORDER — AMLODIPINE BESYLATE 5 MG/1
5 TABLET ORAL
Qty: 30 | Refills: 0 | Status: DISCONTINUED | COMMUNITY
Start: 2022-08-18 | End: 2023-02-08

## 2023-02-08 RX ORDER — SOLIFENACIN SUCCINATE 5 MG/1
5 TABLET ORAL
Qty: 90 | Refills: 0 | Status: DISCONTINUED | COMMUNITY
Start: 2021-04-02 | End: 2023-02-08

## 2023-02-08 RX ORDER — OXYCODONE AND ACETAMINOPHEN 5; 325 MG/1; MG/1
5-325 TABLET ORAL
Qty: 5 | Refills: 0 | Status: DISCONTINUED | COMMUNITY
Start: 2022-10-04 | End: 2023-02-08

## 2023-02-08 RX ORDER — TROSPIUM CHLORIDE 20 MG/1
20 TABLET, FILM COATED ORAL
Qty: 90 | Refills: 3 | Status: DISCONTINUED | COMMUNITY
Start: 2022-11-30 | End: 2023-02-08

## 2023-02-08 RX ORDER — LEVETIRACETAM 750 MG/1
750 TABLET, FILM COATED ORAL
Qty: 180 | Refills: 0 | Status: COMPLETED | COMMUNITY
Start: 2020-01-29 | End: 2022-09-01

## 2023-02-08 RX ORDER — TAMSULOSIN HYDROCHLORIDE 0.4 MG/1
0.4 CAPSULE ORAL
Qty: 30 | Refills: 0 | Status: DISCONTINUED | COMMUNITY
Start: 2022-08-18 | End: 2023-02-08

## 2023-02-08 RX ORDER — BUSPIRONE HYDROCHLORIDE 10 MG/1
10 TABLET ORAL
Qty: 60 | Refills: 0 | Status: DISCONTINUED | COMMUNITY
Start: 2022-08-18 | End: 2023-02-08

## 2023-02-08 RX ORDER — DARIFENACIN HYDROBROMIDE 7.5 MG/1
7.5 TABLET, EXTENDED RELEASE ORAL
Qty: 30 | Refills: 5 | Status: DISCONTINUED | COMMUNITY
Start: 2022-12-21 | End: 2023-02-08

## 2023-02-08 RX ORDER — HYDRALAZINE HYDROCHLORIDE 25 MG/1
25 TABLET ORAL
Qty: 10 | Refills: 0 | Status: DISCONTINUED | COMMUNITY
Start: 2021-10-12 | End: 2023-02-08

## 2023-02-08 RX ORDER — CEFPODOXIME PROXETIL 200 MG/1
200 TABLET, FILM COATED ORAL
Qty: 6 | Refills: 0 | Status: DISCONTINUED | COMMUNITY
Start: 2021-02-12 | End: 2023-02-08

## 2023-02-08 RX ORDER — SODIUM SULFATE, POTASSIUM SULFATE AND MAGNESIUM SULFATE 1.6; 3.13; 17.5 G/177ML; G/177ML; G/177ML
17.5-3.13-1.6 SOLUTION ORAL
Qty: 354 | Refills: 0 | Status: DISCONTINUED | COMMUNITY
Start: 2022-10-27 | End: 2023-02-08

## 2023-02-08 RX ORDER — MIRABEGRON 50 MG/1
50 TABLET, FILM COATED, EXTENDED RELEASE ORAL
Qty: 30 | Refills: 5 | Status: DISCONTINUED | COMMUNITY
Start: 2021-10-01 | End: 2023-02-08

## 2023-02-08 RX ORDER — FESOTERODINE FUMARATE 4 MG/1
4 TABLET, FILM COATED, EXTENDED RELEASE ORAL
Qty: 30 | Refills: 5 | Status: DISCONTINUED | COMMUNITY
Start: 2020-12-02 | End: 2023-02-08

## 2023-02-08 RX ORDER — AMOXICILLIN 875 MG/1
875 TABLET, FILM COATED ORAL
Qty: 20 | Refills: 0 | Status: DISCONTINUED | COMMUNITY
Start: 2022-10-04 | End: 2023-02-08

## 2023-02-08 NOTE — HISTORY OF PRESENT ILLNESS
[FreeTextEntry1] : Ric is here for the F/u accompanied by his son.He lost good amount of weight. He says overall he is doing better.\par He denies having had a seizure or a behavior suggestive of seizure.\par He is sleeping perhaps more efficiently , by the fact that has more energy and less of sleepiness during the day\par memory and mood are OK\par He appears more motivated to do enough of activities to be able to walk without the cane\par He watches a good amount of TV\par Not reading .\par has a good memory of what he watches on the news\par No falls\par  doing chair Yoga in [facility] \par NO vertigo\par No syncopal event\par \par

## 2023-02-08 NOTE — ASSESSMENT
[FreeTextEntry1] : 1- partial epilepsy\par 2- ADAIR\par 3- spinal stenosis\par 4- Left MCA stroke\par 5- HTN\par 6- DM\par 7-  Neuropathy\par 8- gait D/O\par 9- R/O some parkinsonian features\par \par \par Plan \par PT\par level\par F/U\par \par \par

## 2023-02-28 ENCOUNTER — APPOINTMENT (OUTPATIENT)
Dept: UROLOGY | Facility: CLINIC | Age: 73
End: 2023-02-28
Payer: MEDICARE

## 2023-02-28 VITALS
TEMPERATURE: 96.9 F | WEIGHT: 208 LBS | RESPIRATION RATE: 18 BRPM | HEART RATE: 55 BPM | OXYGEN SATURATION: 98 % | SYSTOLIC BLOOD PRESSURE: 156 MMHG | HEIGHT: 66 IN | BODY MASS INDEX: 33.43 KG/M2 | DIASTOLIC BLOOD PRESSURE: 71 MMHG

## 2023-02-28 PROCEDURE — 99213 OFFICE O/P EST LOW 20 MIN: CPT

## 2023-02-28 NOTE — END OF VISIT
[FreeTextEntry3] : I participated in obtaining the history, discussed treatment plan with patient agree with the above transcription

## 2023-02-28 NOTE — HISTORY OF PRESENT ILLNESS
[FreeTextEntry1] : 72-year-old with urinary frequency.  He has had improvement after PTNS treatment and is currently on monthly PTNS which patient states he feels is helping him.  He has since last visit started Gemtesa 75 mg which she also feels is helping.  The copayment is $100 a month and patient states he is discussing with his insurance company potentially lowering his cost.  Last PSA 2020 1.4 ng/mL.

## 2023-02-28 NOTE — ASSESSMENT
[FreeTextEntry1] : 72-year-old on finasteride 5 mg daily, Gemtesa 75 mg daily, and monthly PTNS.\par Symptoms are currently controlled and patient currently reports feeling well.\par \par Follow-up 2 weeks for monthly PTNS.  PSA prior.

## 2023-03-08 ENCOUNTER — APPOINTMENT (OUTPATIENT)
Dept: UROLOGY | Facility: CLINIC | Age: 73
End: 2023-03-08
Payer: MEDICARE

## 2023-03-08 PROCEDURE — 64566 NEUROELTRD STIM POST TIBIAL: CPT

## 2023-03-15 NOTE — ED ADULT NURSE NOTE - NS_NURSE_DISC_TEACHING_YN_ED_ALL_ED
Progress Note  3/15/2023 8:57 AM     Patient: Tej Edwards   MRN: [id number]   : 1955     SUBJECTIVE  No acute events overnight. Patient reports feeling better. Denies fever or chills. Ambulating well.     PHYSICAL EXAM  Vitals:  Blood pressure (!) 144/75, pulse (!) 52, temperature 97.9 °F (36.6 °C), temperature source Oral, resp. rate 17, SpO2 98 %.    General: no acute distress  CV: regular rate  Resp: non labored breathing  GI: abdomen soft, mild tenderness to RUQ, no rebound, rigidity, or peritonitis. Cholecystostomy tube in place RUQ with bilious drainage   MSK: Moving all 4 extremities  Neuro: Alert  Skin: Warm, dry    INTAKE/OUTPUT  I/O last 3 completed shifts:  In: 830 [P.O.:720; Other:110]  Out: 850 [Urine:650; Drains:200]  I/O this shift:  In: -   Out: 200 [Urine:200]     LABS  WBC (K/mcL)   Date Value   03/15/2023 9.3     RBC (mil/mcL)   Date Value   03/15/2023 4.00     HCT (%)   Date Value   03/15/2023 37.7     HGB (g/dL)   Date Value   03/15/2023 12.2     PLT (K/mcL)   Date Value   03/15/2023 182       Sodium (mmol/L)   Date Value   03/15/2023 139     Potassium (mmol/L)   Date Value   03/15/2023 3.2 (L)     Chloride (mmol/L)   Date Value   03/15/2023 107     Glucose (mg/dL)   Date Value   03/15/2023 102 (H)     Calcium (mg/dL)   Date Value   03/15/2023 8.6     Carbon Dioxide (mmol/L)   Date Value   03/15/2023 25     BUN (mg/dL)   Date Value   03/15/2023 14     Creatinine (mg/dL)   Date Value   03/15/2023 0.74       GOT/AST (Units/L)   Date Value   03/15/2023 12     GPT/ALT (Units/L)   Date Value   03/15/2023 18     No results found for: GGTP  Alkaline Phosphatase (Units/L)   Date Value   03/15/2023 73     Bilirubin, Total (mg/dL)   Date Value   03/15/2023 0.4       IMAGING  CT ABDOMEN PELVIS W CONTRAST - IV contrast only    Result Date: 3/13/2023  EXAM: CT ABDOMEN PELVIS W CONTRAST HISTORY: right sided pain LRQ pain COMPARISON: None. TECHNIQUE: Following intravenous administration of 100 mL  Omnipaque 300, CT imaging through the abdomen and pelvis was performed. Coronal and sagittal reformations are provided. The mA and/or kV were adjusted based on the patient's size. FINDINGS: LOWER THORAX: Unremarkable. LIVER: 12 mm hypodensity in the right hepatic lobe is indeterminate. GALLBLADDER AND BILIARY TREE: Gallbladder wall thickening and pericholecystic fluid.  No biliary ductal dilatation PANCREAS: Unremarkable. SPLEEN: Unremarkable. ADRENAL GLANDS: Nodular thickening of the left adrenal gland, too small to definitively characterize.  Right adrenal gland is unremarkable. KIDNEYS: Left renal sinus cysts.  No hydronephrosis.  Right kidney unremarkable. PELVIS: Unremarkable. BOWEL: Colonic diverticulosis.  No dilated bowel loops. VESSELS: The abdominal aorta is normal caliber. LYMPH NODES: No enlarged lymph nodes are seen. OTHER: There is no free fluid or free air. SOFT TISSUES: Small fat-containing umbilical hernia. BONES: There are mild spinal degenerative changes.     1.    Findings suggest acute cholecystitis.  No biliary ductal dilatation. 2.    12 mm hypodensity in the right hepatic lobe is indeterminate. Recommend further evaluation with pre and postcontrast MRI. 3.   Colonic diverticulosis. Electronically Signed by: GERMAINE DUBON M.D. Signed on: 3/13/2023 7:15 PM Workstation ID: [id number]       Assessment:  Tej Edwards is a 67 year old female with clinical presentation concerning for acute cholecystitis. Lab work significant for leukocytosis WBC to 23. CT abdomen pelvis with significant gallbladder wall thickening and pericholecystic fluid. Physical exam with significant RUQ tenderness, + Garcia's sign. Given that patient has been symptomatic for > 1 week and the degree of gallbladder inflammation seen on imaging, patient will benefit from a cholecystostomy tube placed by IR with plans for interval cholecystectomy.   Patient is s/p cholecystostomy tube placement on 03/14.         Plan:  - Antibiotics per medicine   - Advance diet as tolerated  - Plans for interval cholecystectomy in 4-6 weeks, plan to follow up outpatient for planning of this   - Ok to discharge from general surgery standpoint   - Rest of care per Primary Team    Will discuss plan with Dr. Salazar.    Rebecca Israel M4     Note reviewed and changes made    Liliana Pa PGY 4  Surgery          Yes

## 2023-03-24 ENCOUNTER — NON-APPOINTMENT (OUTPATIENT)
Age: 73
End: 2023-03-24

## 2023-03-25 LAB
ALBUMIN SERPL ELPH-MCNC: 4.2 G/DL
ALP BLD-CCNC: 72 U/L
ALT SERPL-CCNC: 20 U/L
ANION GAP SERPL CALC-SCNC: 11 MMOL/L
AST SERPL-CCNC: 20 U/L
BILIRUB SERPL-MCNC: 0.5 MG/DL
BUN SERPL-MCNC: 14 MG/DL
CALCIUM SERPL-MCNC: 9.3 MG/DL
CHLORIDE SERPL-SCNC: 100 MMOL/L
CO2 SERPL-SCNC: 29 MMOL/L
CREAT SERPL-MCNC: 0.8 MG/DL
EGFR: 95 ML/MIN/1.73M2
GLUCOSE SERPL-MCNC: 92 MG/DL
POTASSIUM SERPL-SCNC: 4.5 MMOL/L
PROT SERPL-MCNC: 7 G/DL
SODIUM SERPL-SCNC: 140 MMOL/L

## 2023-03-27 LAB
PSA FREE FLD-MCNC: 27 %
PSA FREE SERPL-MCNC: 0.29 NG/ML
PSA SERPL-MCNC: 1.07 NG/ML

## 2023-03-28 ENCOUNTER — APPOINTMENT (OUTPATIENT)
Dept: UROLOGY | Facility: CLINIC | Age: 73
End: 2023-03-28
Payer: MEDICARE

## 2023-03-28 VITALS
BODY MASS INDEX: 33.43 KG/M2 | DIASTOLIC BLOOD PRESSURE: 67 MMHG | TEMPERATURE: 98 F | SYSTOLIC BLOOD PRESSURE: 132 MMHG | WEIGHT: 208 LBS | HEIGHT: 66 IN

## 2023-03-28 PROCEDURE — 99213 OFFICE O/P EST LOW 20 MIN: CPT

## 2023-03-28 NOTE — HISTORY OF PRESENT ILLNESS
[FreeTextEntry1] : 72-year-old with BPH, urinary frequency receiving PTNS monthly.  This has improved him somewhat.  He was on Gemtesa also but has temporarily discontinued it due to cost and is trying to work through his insurance to get the cost reduced.  He has significant cardiac history as being evaluated for cardiac surgery versus stents.  Recently saw surgeon in [place] who recommended against open heart surgery.\par \par Finasteride has been prescribed but the patient is not sure whether he is taking it.  PSA 1.07\par \par Patient has been on antimuscarinics in the past including oxybutynin ER without relief

## 2023-03-30 LAB — OXCARBAZEPINE SERPL-MCNC: 13 UG/ML

## 2023-04-10 ENCOUNTER — RX RENEWAL (OUTPATIENT)
Age: 73
End: 2023-04-10

## 2023-04-10 RX ORDER — OXCARBAZEPINE 300 MG/1
300 TABLET, FILM COATED ORAL
Qty: 180 | Refills: 0 | Status: ACTIVE | COMMUNITY
Start: 2020-07-20 | End: 1900-01-01

## 2023-04-12 ENCOUNTER — APPOINTMENT (OUTPATIENT)
Dept: UROLOGY | Facility: CLINIC | Age: 73
End: 2023-04-12
Payer: MEDICARE

## 2023-04-12 PROCEDURE — 64566 NEUROELTRD STIM POST TIBIAL: CPT

## 2023-04-24 NOTE — PRE-ANESTHESIA EVALUATION ADULT - HEIGHT IN FEET
Taina called back and left message.   I tried her back and left message again for her to call the office back for recommendations from Dr. Nohemy Vigil 5

## 2023-05-10 ENCOUNTER — APPOINTMENT (OUTPATIENT)
Dept: UROLOGY | Facility: CLINIC | Age: 73
End: 2023-05-10
Payer: MEDICARE

## 2023-05-10 PROCEDURE — 64566 NEUROELTRD STIM POST TIBIAL: CPT

## 2023-06-14 ENCOUNTER — APPOINTMENT (OUTPATIENT)
Dept: UROLOGY | Facility: CLINIC | Age: 73
End: 2023-06-14
Payer: MEDICARE

## 2023-06-14 DIAGNOSIS — R39.15 URGENCY OF URINATION: ICD-10-CM

## 2023-06-14 PROCEDURE — 64566 NEUROELTRD STIM POST TIBIAL: CPT

## 2023-07-12 ENCOUNTER — APPOINTMENT (OUTPATIENT)
Dept: UROLOGY | Facility: CLINIC | Age: 73
End: 2023-07-12
Payer: MEDICARE

## 2023-07-12 PROCEDURE — 64566 NEUROELTRD STIM POST TIBIAL: CPT

## 2023-07-26 ENCOUNTER — RX RENEWAL (OUTPATIENT)
Age: 73
End: 2023-07-26

## 2023-08-23 ENCOUNTER — APPOINTMENT (OUTPATIENT)
Dept: UROLOGY | Facility: CLINIC | Age: 73
End: 2023-08-23
Payer: MEDICARE

## 2023-08-23 PROCEDURE — 64566 NEUROELTRD STIM POST TIBIAL: CPT

## 2023-09-20 ENCOUNTER — APPOINTMENT (OUTPATIENT)
Dept: UROLOGY | Facility: CLINIC | Age: 73
End: 2023-09-20
Payer: MEDICARE

## 2023-09-20 PROCEDURE — 64566 NEUROELTRD STIM POST TIBIAL: CPT

## 2023-10-16 ENCOUNTER — APPOINTMENT (OUTPATIENT)
Dept: NEUROLOGY | Facility: CLINIC | Age: 73
End: 2023-10-16
Payer: MEDICARE

## 2023-10-16 VITALS
TEMPERATURE: 97.6 F | DIASTOLIC BLOOD PRESSURE: 84 MMHG | BODY MASS INDEX: 34.96 KG/M2 | HEART RATE: 79 BPM | SYSTOLIC BLOOD PRESSURE: 138 MMHG | HEIGHT: 66 IN | OXYGEN SATURATION: 98 % | WEIGHT: 217.5 LBS

## 2023-10-16 PROCEDURE — 99214 OFFICE O/P EST MOD 30 MIN: CPT

## 2023-10-18 ENCOUNTER — APPOINTMENT (OUTPATIENT)
Dept: UROLOGY | Facility: CLINIC | Age: 73
End: 2023-10-18
Payer: MEDICARE

## 2023-10-18 PROCEDURE — 64566 NEUROELTRD STIM POST TIBIAL: CPT

## 2023-10-18 RX ORDER — ATORVASTATIN CALCIUM 40 MG/1
40 TABLET, FILM COATED ORAL
Refills: 0 | Status: ACTIVE | COMMUNITY

## 2023-10-18 RX ORDER — LEVETIRACETAM 750 MG/1
750 TABLET, FILM COATED ORAL TWICE DAILY
Refills: 0 | Status: ACTIVE | COMMUNITY

## 2023-10-18 RX ORDER — METFORMIN HYDROCHLORIDE 1000 MG/1
1000 TABLET, COATED ORAL
Refills: 0 | Status: ACTIVE | COMMUNITY

## 2023-10-18 RX ORDER — METOPROLOL TARTRATE 25 MG/1
25 TABLET, FILM COATED ORAL
Refills: 0 | Status: ACTIVE | COMMUNITY

## 2023-10-18 RX ORDER — CLOPIDOGREL BISULFATE 75 MG/1
75 TABLET, FILM COATED ORAL
Refills: 0 | Status: ACTIVE | COMMUNITY

## 2023-11-09 ENCOUNTER — APPOINTMENT (OUTPATIENT)
Dept: OTOLARYNGOLOGY | Facility: CLINIC | Age: 73
End: 2023-11-09
Payer: MEDICARE

## 2023-11-09 DIAGNOSIS — R06.83 SNORING: ICD-10-CM

## 2023-11-09 PROCEDURE — 31575 DIAGNOSTIC LARYNGOSCOPY: CPT

## 2023-11-09 PROCEDURE — 99214 OFFICE O/P EST MOD 30 MIN: CPT | Mod: 25

## 2023-11-15 ENCOUNTER — APPOINTMENT (OUTPATIENT)
Dept: UROLOGY | Facility: CLINIC | Age: 73
End: 2023-11-15
Payer: MEDICARE

## 2023-11-15 DIAGNOSIS — N13.8 BENIGN PROSTATIC HYPERPLASIA WITH LOWER URINARY TRACT SYMPMS: ICD-10-CM

## 2023-11-15 DIAGNOSIS — N40.1 BENIGN PROSTATIC HYPERPLASIA WITH LOWER URINARY TRACT SYMPMS: ICD-10-CM

## 2023-11-15 LAB
BILIRUB UR QL STRIP: NORMAL
COLLECTION METHOD: NORMAL
GLUCOSE UR-MCNC: NORMAL
HCG UR QL: 1 EU/DL
HGB UR QL STRIP.AUTO: NORMAL
KETONES UR-MCNC: NORMAL
LEUKOCYTE ESTERASE UR QL STRIP: NORMAL
NITRITE UR QL STRIP: NORMAL
PH UR STRIP: 5.5
PROT UR STRIP-MCNC: NORMAL
SP GR UR STRIP: 1.02

## 2023-11-15 PROCEDURE — 81003 URINALYSIS AUTO W/O SCOPE: CPT | Mod: QW

## 2023-11-15 PROCEDURE — 64566 NEUROELTRD STIM POST TIBIAL: CPT

## 2023-12-27 NOTE — ED ADULT NURSE NOTE - IS THE PATIENT ABLE TO BE SCREENED?
Patient: Jolene Jack  : 1936    Encounter Date: 2023    PROGRESS NOTE    Subjective  Chief complaint: Jolene Jack is a 87 y.o. female patient being seen and evaluated for monthly general medical care and follow-up    HPI:  23 Patient presents for general medical care and f/u.  Patient seen and examined at bedside.  No issues per nursing.  Patient has no acute complaints.          Objective  Vital signs: 130/51, 97.7, 78, 95%    Physical Exam  Constitutional:       General: She is not in acute distress.  Eyes:      Extraocular Movements: Extraocular movements intact.   Pulmonary:      Effort: Pulmonary effort is normal.   Musculoskeletal:      Cervical back: Neck supple.   Neurological:      Mental Status: She is alert.   Psychiatric:         Mood and Affect: Mood normal.         Behavior: Behavior is cooperative.         Assessment/Plan  Problem List Items Addressed This Visit       Alzheimers disease (CMS/HCC) - Primary     Patient at baseline no behaviors reported by staff         Weakness     Encourage pt to ask for assistance          Depression     Stable           Medications, treatments, and labs reviewed  Continue medications and treatments as listed in EMR    Scribe Attestation  IBelkys Scribe   attest that this documentation has been prepared under the direction and in the presence of MEI Charles.    Provider Attestation - Scribe documentation  All medical record entries made by the Scribe were at my direction and personally dictated by me. I have reviewed the chart and agree that the record accurately reflects my personal performance of the history, physical exam, discussion and plan.    MEI Charles      Electronically Signed By: MEI Charles   24  3:48 PM   No Post-Care Instructions: I reviewed with the patient in detail post-care instructions. Patient is not to engage in any heavy lifting, exercise, or swimming for the next 14 days. Should the patient develop any fevers, chills, bleeding, severe pain patient will contact the office immediately.

## 2024-01-04 ENCOUNTER — OUTPATIENT (OUTPATIENT)
Dept: OUTPATIENT SERVICES | Facility: HOSPITAL | Age: 74
LOS: 1 days | End: 2024-01-04
Payer: MEDICARE

## 2024-01-04 VITALS
HEART RATE: 67 BPM | SYSTOLIC BLOOD PRESSURE: 157 MMHG | TEMPERATURE: 98 F | RESPIRATION RATE: 20 BRPM | WEIGHT: 229.94 LBS | HEIGHT: 66 IN | OXYGEN SATURATION: 100 % | DIASTOLIC BLOOD PRESSURE: 79 MMHG

## 2024-01-04 DIAGNOSIS — G47.33 OBSTRUCTIVE SLEEP APNEA (ADULT) (PEDIATRIC): ICD-10-CM

## 2024-01-04 DIAGNOSIS — Z01.818 ENCOUNTER FOR OTHER PREPROCEDURAL EXAMINATION: ICD-10-CM

## 2024-01-04 DIAGNOSIS — Z98.890 OTHER SPECIFIED POSTPROCEDURAL STATES: Chronic | ICD-10-CM

## 2024-01-04 DIAGNOSIS — H26.9 UNSPECIFIED CATARACT: Chronic | ICD-10-CM

## 2024-01-04 LAB
A1C WITH ESTIMATED AVERAGE GLUCOSE RESULT: 6.1 % — HIGH (ref 4–5.6)
A1C WITH ESTIMATED AVERAGE GLUCOSE RESULT: 6.1 % — HIGH (ref 4–5.6)
ALBUMIN SERPL ELPH-MCNC: 4.2 G/DL — SIGNIFICANT CHANGE UP (ref 3.5–5.2)
ALBUMIN SERPL ELPH-MCNC: 4.2 G/DL — SIGNIFICANT CHANGE UP (ref 3.5–5.2)
ALP SERPL-CCNC: 71 U/L — SIGNIFICANT CHANGE UP (ref 30–115)
ALP SERPL-CCNC: 71 U/L — SIGNIFICANT CHANGE UP (ref 30–115)
ALT FLD-CCNC: 17 U/L — SIGNIFICANT CHANGE UP (ref 0–41)
ALT FLD-CCNC: 17 U/L — SIGNIFICANT CHANGE UP (ref 0–41)
ANION GAP SERPL CALC-SCNC: 11 MMOL/L — SIGNIFICANT CHANGE UP (ref 7–14)
ANION GAP SERPL CALC-SCNC: 11 MMOL/L — SIGNIFICANT CHANGE UP (ref 7–14)
AST SERPL-CCNC: 17 U/L — SIGNIFICANT CHANGE UP (ref 0–41)
AST SERPL-CCNC: 17 U/L — SIGNIFICANT CHANGE UP (ref 0–41)
BASOPHILS # BLD AUTO: 0.06 K/UL — SIGNIFICANT CHANGE UP (ref 0–0.2)
BASOPHILS # BLD AUTO: 0.06 K/UL — SIGNIFICANT CHANGE UP (ref 0–0.2)
BASOPHILS NFR BLD AUTO: 0.8 % — SIGNIFICANT CHANGE UP (ref 0–1)
BASOPHILS NFR BLD AUTO: 0.8 % — SIGNIFICANT CHANGE UP (ref 0–1)
BILIRUB SERPL-MCNC: 0.4 MG/DL — SIGNIFICANT CHANGE UP (ref 0.2–1.2)
BILIRUB SERPL-MCNC: 0.4 MG/DL — SIGNIFICANT CHANGE UP (ref 0.2–1.2)
BUN SERPL-MCNC: 18 MG/DL — SIGNIFICANT CHANGE UP (ref 10–20)
BUN SERPL-MCNC: 18 MG/DL — SIGNIFICANT CHANGE UP (ref 10–20)
CALCIUM SERPL-MCNC: 9.7 MG/DL — SIGNIFICANT CHANGE UP (ref 8.4–10.5)
CALCIUM SERPL-MCNC: 9.7 MG/DL — SIGNIFICANT CHANGE UP (ref 8.4–10.5)
CHLORIDE SERPL-SCNC: 98 MMOL/L — SIGNIFICANT CHANGE UP (ref 98–110)
CHLORIDE SERPL-SCNC: 98 MMOL/L — SIGNIFICANT CHANGE UP (ref 98–110)
CO2 SERPL-SCNC: 27 MMOL/L — SIGNIFICANT CHANGE UP (ref 17–32)
CO2 SERPL-SCNC: 27 MMOL/L — SIGNIFICANT CHANGE UP (ref 17–32)
CREAT SERPL-MCNC: 0.8 MG/DL — SIGNIFICANT CHANGE UP (ref 0.7–1.5)
CREAT SERPL-MCNC: 0.8 MG/DL — SIGNIFICANT CHANGE UP (ref 0.7–1.5)
EGFR: 94 ML/MIN/1.73M2 — SIGNIFICANT CHANGE UP
EGFR: 94 ML/MIN/1.73M2 — SIGNIFICANT CHANGE UP
EOSINOPHIL # BLD AUTO: 0.2 K/UL — SIGNIFICANT CHANGE UP (ref 0–0.7)
EOSINOPHIL # BLD AUTO: 0.2 K/UL — SIGNIFICANT CHANGE UP (ref 0–0.7)
EOSINOPHIL NFR BLD AUTO: 2.6 % — SIGNIFICANT CHANGE UP (ref 0–8)
EOSINOPHIL NFR BLD AUTO: 2.6 % — SIGNIFICANT CHANGE UP (ref 0–8)
ESTIMATED AVERAGE GLUCOSE: 128 MG/DL — HIGH (ref 68–114)
ESTIMATED AVERAGE GLUCOSE: 128 MG/DL — HIGH (ref 68–114)
GLUCOSE SERPL-MCNC: 87 MG/DL — SIGNIFICANT CHANGE UP (ref 70–99)
GLUCOSE SERPL-MCNC: 87 MG/DL — SIGNIFICANT CHANGE UP (ref 70–99)
HCT VFR BLD CALC: 49.1 % — SIGNIFICANT CHANGE UP (ref 42–52)
HCT VFR BLD CALC: 49.1 % — SIGNIFICANT CHANGE UP (ref 42–52)
HGB BLD-MCNC: 15.9 G/DL — SIGNIFICANT CHANGE UP (ref 14–18)
HGB BLD-MCNC: 15.9 G/DL — SIGNIFICANT CHANGE UP (ref 14–18)
IMM GRANULOCYTES NFR BLD AUTO: 0.4 % — HIGH (ref 0.1–0.3)
IMM GRANULOCYTES NFR BLD AUTO: 0.4 % — HIGH (ref 0.1–0.3)
LYMPHOCYTES # BLD AUTO: 1.8 K/UL — SIGNIFICANT CHANGE UP (ref 1.2–3.4)
LYMPHOCYTES # BLD AUTO: 1.8 K/UL — SIGNIFICANT CHANGE UP (ref 1.2–3.4)
LYMPHOCYTES # BLD AUTO: 23.1 % — SIGNIFICANT CHANGE UP (ref 20.5–51.1)
LYMPHOCYTES # BLD AUTO: 23.1 % — SIGNIFICANT CHANGE UP (ref 20.5–51.1)
MCHC RBC-ENTMCNC: 30.6 PG — SIGNIFICANT CHANGE UP (ref 27–31)
MCHC RBC-ENTMCNC: 30.6 PG — SIGNIFICANT CHANGE UP (ref 27–31)
MCHC RBC-ENTMCNC: 32.4 G/DL — SIGNIFICANT CHANGE UP (ref 32–37)
MCHC RBC-ENTMCNC: 32.4 G/DL — SIGNIFICANT CHANGE UP (ref 32–37)
MCV RBC AUTO: 94.4 FL — HIGH (ref 80–94)
MCV RBC AUTO: 94.4 FL — HIGH (ref 80–94)
MONOCYTES # BLD AUTO: 0.9 K/UL — HIGH (ref 0.1–0.6)
MONOCYTES # BLD AUTO: 0.9 K/UL — HIGH (ref 0.1–0.6)
MONOCYTES NFR BLD AUTO: 11.6 % — HIGH (ref 1.7–9.3)
MONOCYTES NFR BLD AUTO: 11.6 % — HIGH (ref 1.7–9.3)
NEUTROPHILS # BLD AUTO: 4.8 K/UL — SIGNIFICANT CHANGE UP (ref 1.4–6.5)
NEUTROPHILS # BLD AUTO: 4.8 K/UL — SIGNIFICANT CHANGE UP (ref 1.4–6.5)
NEUTROPHILS NFR BLD AUTO: 61.5 % — SIGNIFICANT CHANGE UP (ref 42.2–75.2)
NEUTROPHILS NFR BLD AUTO: 61.5 % — SIGNIFICANT CHANGE UP (ref 42.2–75.2)
NRBC # BLD: 0 /100 WBCS — SIGNIFICANT CHANGE UP (ref 0–0)
NRBC # BLD: 0 /100 WBCS — SIGNIFICANT CHANGE UP (ref 0–0)
PLATELET # BLD AUTO: 190 K/UL — SIGNIFICANT CHANGE UP (ref 130–400)
PLATELET # BLD AUTO: 190 K/UL — SIGNIFICANT CHANGE UP (ref 130–400)
PMV BLD: 9.9 FL — SIGNIFICANT CHANGE UP (ref 7.4–10.4)
PMV BLD: 9.9 FL — SIGNIFICANT CHANGE UP (ref 7.4–10.4)
POTASSIUM SERPL-MCNC: 4.6 MMOL/L — SIGNIFICANT CHANGE UP (ref 3.5–5)
POTASSIUM SERPL-MCNC: 4.6 MMOL/L — SIGNIFICANT CHANGE UP (ref 3.5–5)
POTASSIUM SERPL-SCNC: 4.6 MMOL/L — SIGNIFICANT CHANGE UP (ref 3.5–5)
POTASSIUM SERPL-SCNC: 4.6 MMOL/L — SIGNIFICANT CHANGE UP (ref 3.5–5)
PROT SERPL-MCNC: 7.3 G/DL — SIGNIFICANT CHANGE UP (ref 6–8)
PROT SERPL-MCNC: 7.3 G/DL — SIGNIFICANT CHANGE UP (ref 6–8)
RBC # BLD: 5.2 M/UL — SIGNIFICANT CHANGE UP (ref 4.7–6.1)
RBC # BLD: 5.2 M/UL — SIGNIFICANT CHANGE UP (ref 4.7–6.1)
RBC # FLD: 12.4 % — SIGNIFICANT CHANGE UP (ref 11.5–14.5)
RBC # FLD: 12.4 % — SIGNIFICANT CHANGE UP (ref 11.5–14.5)
SODIUM SERPL-SCNC: 136 MMOL/L — SIGNIFICANT CHANGE UP (ref 135–146)
SODIUM SERPL-SCNC: 136 MMOL/L — SIGNIFICANT CHANGE UP (ref 135–146)
WBC # BLD: 7.79 K/UL — SIGNIFICANT CHANGE UP (ref 4.8–10.8)
WBC # BLD: 7.79 K/UL — SIGNIFICANT CHANGE UP (ref 4.8–10.8)
WBC # FLD AUTO: 7.79 K/UL — SIGNIFICANT CHANGE UP (ref 4.8–10.8)
WBC # FLD AUTO: 7.79 K/UL — SIGNIFICANT CHANGE UP (ref 4.8–10.8)

## 2024-01-04 PROCEDURE — 83036 HEMOGLOBIN GLYCOSYLATED A1C: CPT

## 2024-01-04 PROCEDURE — 36415 COLL VENOUS BLD VENIPUNCTURE: CPT

## 2024-01-04 PROCEDURE — 93005 ELECTROCARDIOGRAM TRACING: CPT

## 2024-01-04 PROCEDURE — 80053 COMPREHEN METABOLIC PANEL: CPT

## 2024-01-04 PROCEDURE — 99214 OFFICE O/P EST MOD 30 MIN: CPT | Mod: 25

## 2024-01-04 PROCEDURE — 85025 COMPLETE CBC W/AUTO DIFF WBC: CPT

## 2024-01-04 PROCEDURE — 93010 ELECTROCARDIOGRAM REPORT: CPT

## 2024-01-04 RX ORDER — OXYBUTYNIN CHLORIDE 5 MG
1 TABLET ORAL
Qty: 0 | Refills: 0 | DISCHARGE

## 2024-01-04 RX ORDER — SITAGLIPTIN AND METFORMIN HYDROCHLORIDE 500; 50 MG/1; MG/1
1 TABLET, FILM COATED ORAL
Qty: 0 | Refills: 0 | DISCHARGE

## 2024-01-04 NOTE — H&P PST ADULT - HISTORY OF PRESENT ILLNESS
PATIENT CURRENTLY DENIES CHEST PAIN  SHORTNESS OF BREATH  PALPITATIONS,  DYSURIA, OR UPPER RESPIRATORY INFECTION IN PAST 2 WEEKS  den[place] travel outside the [place] in the past 30 days  Patient denies any signs or symptoms of COVID 19 and denies contact with known positive individuals.  They have an appointment for repeat COVID testing pre-procedure and acknowledge its time and place.  They were instructed to quarantine pre-procedure, practice exposure control measures, continue to self-monitor and report any concerns to their proceduralist.  pt advised self quarantine till day of procedure    Anesthesia Alert  Difficult Airway class IV   NO--History of neck surgery or radiation  NO--Limited ROM of neck  NO--History of Malignant hyperthermia  NO--No personal or family history of Pseudocholinesterase deficiency.  NO--Prior Anesthesia Complication  NO--Latex Allergy  NO--Loose teeth  NO--History of Rheumatoid Arthritis  +Bleeding risk plavix   + ADAIR  NO--Other_____    PT DENIES ANY RASHES, ABRASION, OR OPEN WOUNDS OR CUTS    AS PER THE PT, THIS IS HIS/HER COMPLETE MEDICAL AND SURGICAL HX, INCLUDING MEDICATIONS PRESCRIBED AND OVER THE COUNTER    Patient verbalized understanding of instructions and was given the opportunity to ask questions and have them answered.    pt denies any suicidal ideation or thoughts, pt states not a threat to self or others    Obstructive sleep apnea syndrome    Encounter for other preprocedural examination    FH: stomach cancer (Father)    FH: CAD (coronary artery disease) (Mother)    Nonintractable epilepsy without status epilepticus, unspecified epilepsy type    Neuropathy    Hypertension, unspecified type    Type 2 diabetes mellitus with complication, without long-term current use of insulin    Anxiety    Cerebrovascular accident (CVA), unspecified mechanism    Seizures    TIA (transient ischemic attack)    Hypercholesteremia    Overactive bladder    Depression    BPH (benign prostatic hyperplasia)    Obese    ADAIR on CPAP    History of surgery    Capsular cataract of right eye    S/P ORIF (open reduction internal fixation) fracture    [id number]    SysAdmin_VstLnk    Duke Activity Status Index (DASI) from [employer]  RESULT SUMMARY:  24.2 points  The higher the score (maximum 58.2), the higher the functional status.    5.72 METs        INPUTS:  Take care of self —> 2.75 = Yes  Walk indoors —> 1.75 = Yes  Walk 1&ndash;2 blocks on level ground —> 2.75 = Yes  Climb a flight of stairs or walk up a hill —> 5.5 = Yes  Run a short distance —> 0 = No  Do light work around the house —> 2.7 = Yes  Do moderate work around the house —> 3.5 = Yes  Do heavy work around the house —> 0 = No  Do yardwork —> 0 = No  Have sexual relations —> 5.25 = Yes  Participate in moderate recreational activities —> 0 = No  Participate in strenuous sports —> 0 = No    Revised Cardiac Risk Index for Pre-Operative Risk from [employer]   RESULT SUMMARY:  2 points  Class III Risk    10.1 %  30-day risk of death, MI, or cardiac arrest    From [employer] 2017. These numbers are higher than those from the original study (Abdiel 1999). See Evidence for details.      INPUTS:  Elevated-risk surgery —> 0 = No  History of ischemic heart disease —> 1 = Yes  History of congestive heart failure —> 0 = No  History of cerebrovascular disease —> 1 = Yes  Pre-operative treatment with insulin —> 0 = No  Pre-operative creatinine >2 mg/dL / 176.8 µmol/L —> 0 = No       PATIENT CURRENTLY DENIES CHEST PAIN  SHORTNESS OF BREATH  PALPITATIONS,  DYSURIA, OR UPPER RESPIRATORY INFECTION IN PAST 2 WEEKS  den[place] travel outside the [place] in the past 30 days  Patient denies any signs or symptoms of COVID 19 and denies contact with known positive individuals.  They have an appointment for repeat COVID testing pre-procedure and acknowledge its time and place.  They were instructed to quarantine pre-procedure, practice exposure control measures, continue to self-monitor and report any concerns to their proceduralist.  pt advised self quarantine till day of procedure    Anesthesia Alert  Difficult Airway class IV   NO--History of neck surgery or radiation  NO--Limited ROM of neck  NO--History of Malignant hyperthermia  NO--No personal or family history of Pseudocholinesterase deficiency.  NO--Prior Anesthesia Complication  NO--Latex Allergy  NO--Loose teeth  NO--History of Rheumatoid Arthritis  +Bleeding risk plavix   + ADAIR  NO--Other_____    PT DENIES ANY RASHES, ABRASION, OR OPEN WOUNDS OR CUTS    AS PER THE PT, THIS IS HIS/HER COMPLETE MEDICAL AND SURGICAL HX, INCLUDING MEDICATIONS PRESCRIBED AND OVER THE COUNTER    Patient verbalized understanding of instructions and was given the opportunity to ask questions and have them answered.    pt denies any suicidal ideation or thoughts, pt states not a threat to self or others    Obstructive sleep apnea syndrome    Encounter for other preprocedural examination    FH: stomach cancer (Father)    FH: CAD (coronary artery disease) (Mother)    Nonintractable epilepsy without status epilepticus, unspecified epilepsy type    Neuropathy    Hypertension, unspecified type    Type 2 diabetes mellitus with complication, without long-term current use of insulin    Anxiety    Cerebrovascular accident (CVA), unspecified mechanism    Seizures    TIA (transient ischemic attack)    Hypercholesteremia    Overactive bladder    Depression    BPH (benign prostatic hyperplasia)    Obese    ADAIR on CPAP    History of surgery    Capsular cataract of right eye    S/P ORIF (open reduction internal fixation) fracture    [id number]    SysAdmin_VstLnk    Duke Activity Status Index (DASI) from [employer]  RESULT SUMMARY:  24.2 points  The higher the score (maximum 58.2), the higher the functional status.    5.72 METs        INPUTS:  Take care of self —> 2.75 = Yes  Walk indoors —> 1.75 = Yes  Walk 1&ndash;2 blocks on level ground —> 2.75 = Yes  Climb a flight of stairs or walk up a hill —> 5.5 = Yes  Run a short distance —> 0 = No  Do light work around the house —> 2.7 = Yes  Do moderate work around the house —> 3.5 = Yes  Do heavy work around the house —> 0 = No  Do yardwork —> 0 = No  Have sexual relations —> 5.25 = Yes  Participate in moderate recreational activities —> 0 = No  Participate in strenuous sports —> 0 = No    Revised Cardiac Risk Index for Pre-Operative Risk from [employer]   RESULT SUMMARY:  2 points  Class III Risk    10.1 %  30-day risk of death, MI, or cardiac arrest    From [employer] 2017. These numbers are higher than those from the original study (Abdiel 1999). See Evidence for details.      INPUTS:  Elevated-risk surgery —> 0 = No  History of ischemic heart disease —> 1 = Yes  History of congestive heart failure —> 0 = No  History of cerebrovascular disease —> 1 = Yes  Pre-operative treatment with insulin —> 0 = No  Pre-operative creatinine >2 mg/dL / 176.8 µmol/L —> 0 = No       PATIENT CURRENTLY DENIES CHEST PAIN  SHORTNESS OF BREATH  PALPITATIONS,  DYSURIA, OR UPPER RESPIRATORY INFECTION IN PAST 2 WEEKS  den[place] travel outside the [place] in the past 30 days  Patient denies any signs or symptoms of COVID 19 and denies contact with known positive individuals.  They have an appointment for repeat COVID testing pre-procedure and acknowledge its time and place.  They were instructed to quarantine pre-procedure, practice exposure control measures, continue to self-monitor and report any concerns to their proceduralist.  pt advised self quarantine till day of procedure    Anesthesia Alert  Difficult Airway class IV   NO--History of neck surgery or radiation  NO--Limited ROM of neck  NO--History of Malignant hyperthermia  NO--No personal or family history of Pseudocholinesterase deficiency.  NO--Prior Anesthesia Complication  NO--Latex Allergy  NO--Loose teeth  NO--History of Rheumatoid Arthritis  +Bleeding risk plavix   + ADAIR  NO--Other_____    PT DENIES ANY RASHES, ABRASION, OR OPEN WOUNDS OR CUTS    AS PER THE PT, THIS IS HIS/HER COMPLETE MEDICAL AND SURGICAL HX, INCLUDING MEDICATIONS PRESCRIBED AND OVER THE COUNTER    Patient verbalized understanding of instructions and was given the opportunity to ask questions and have them answered.    pt denies any suicidal ideation or thoughts, pt states not a threat to self or others    Obstructive sleep apnea syndrome    Encounter for other preprocedural examination    FH: stomach cancer (Father)    FH: CAD (coronary artery disease) (Mother)    Nonintractable epilepsy without status epilepticus, unspecified epilepsy type    Neuropathy    Hypertension, unspecified type    Type 2 diabetes mellitus with complication, without long-term current use of insulin    Anxiety    Cerebrovascular accident (CVA), unspecified mechanism    Seizures    TIA (transient ischemic attack)    Hypercholesteremia    Overactive bladder    Depression    BPH (benign prostatic hyperplasia)    Obese    ADAIR on CPAP    History of surgery    Capsular cataract of right eye    S/P ORIF (open reduction internal fixation) fracture    [id number]    SysAdmin_VstLnk    Duke Activity Status Index (DASI) from [employer]  RESULT SUMMARY:  24.2 points  The higher the score (maximum 58.2), the higher the functional status.    5.72 METs        INPUTS:  Take care of self —> 2.75 = Yes  Walk indoors —> 1.75 = Yes  Walk 1&ndash;2 blocks on level ground —> 2.75 = Yes  Climb a flight of stairs or walk up a hill —> 5.5 = Yes  Run a short distance —> 0 = No  Do light work around the house —> 2.7 = Yes  Do moderate work around the house —> 3.5 = Yes  Do heavy work around the house —> 0 = No  Do yardwork —> 0 = No  Have sexual relations —> 5.25 = Yes  Participate in moderate recreational activities —> 0 = No  Participate in strenuous sports —> 0 = No    Revised Cardiac Risk Index for Pre-Operative Risk from [employer]   RESULT SUMMARY:  2 points  Class III Risk    10.1 %  30-day risk of death, MI, or cardiac arrest    From [employer] 2017. These numbers are higher than those from the original study (Abdiel 1999). See Evidence for details.      INPUTS:  Elevated-risk surgery —> 0 = No  History of ischemic heart disease —> 1 = Yes  History of congestive heart failure —> 0 = No  History of cerebrovascular disease —> 1 = Yes  Pre-operative treatment with insulin —> 0 = No  Pre-operative creatinine >2 mg/dL / 176.8 µmol/L —> 0 = No       PATIENT CURRENTLY DENIES CHEST PAIN  SHORTNESS OF BREATH  PALPITATIONS,  DYSURIA, OR UPPER RESPIRATORY INFECTION IN PAST 2 WEEKS  den[place] travel outside the [place] in the past 30 days  Patient denies any signs or symptoms of COVID 19 and denies contact with known positive individuals.  They have an appointment for repeat COVID testing pre-procedure and acknowledge its time and place.  They were instructed to quarantine pre-procedure, practice exposure control measures, continue to self-monitor and report any concerns to their proceduralist.  pt advised self quarantine till day of procedure    Anesthesia Alert  Difficult Airway class IV   NO--History of neck surgery or radiation  NO--Limited ROM of neck  NO--History of Malignant hyperthermia  NO--No personal or family history of Pseudocholinesterase deficiency.  NO--Prior Anesthesia Complication  NO--Latex Allergy  NO--Loose teeth  NO--History of Rheumatoid Arthritis  +Bleeding risk plavix   + ADAIR  NO--Other_____    PT DENIES ANY RASHES, ABRASION, OR OPEN WOUNDS OR CUTS    AS PER THE PT, THIS IS HIS/HER COMPLETE MEDICAL AND SURGICAL HX, INCLUDING MEDICATIONS PRESCRIBED AND OVER THE COUNTER    Patient verbalized understanding of instructions and was given the opportunity to ask questions and have them answered.    pt denies any suicidal ideation or thoughts, pt states not a threat to self or others    Obstructive sleep apnea syndrome    Encounter for other preprocedural examination    FH: stomach cancer (Father)    FH: CAD (coronary artery disease) (Mother)    Nonintractable epilepsy without status epilepticus, unspecified epilepsy type    Neuropathy    Hypertension, unspecified type    Type 2 diabetes mellitus with complication, without long-term current use of insulin    Anxiety    Cerebrovascular accident (CVA), unspecified mechanism    Seizures    TIA (transient ischemic attack)    Hypercholesteremia    Overactive bladder    Depression    BPH (benign prostatic hyperplasia)    Obese    ADAIR on CPAP    History of surgery    Capsular cataract of right eye    S/P ORIF (open reduction internal fixation) fracture    [id number]    SysAdmin_VstLnk    Duke Activity Status Index (DASI) from [employer]  RESULT SUMMARY:  24.2 points  The higher the score (maximum 58.2), the higher the functional status.    5.72 METs        INPUTS:  Take care of self —> 2.75 = Yes  Walk indoors —> 1.75 = Yes  Walk 1&ndash;2 blocks on level ground —> 2.75 = Yes  Climb a flight of stairs or walk up a hill —> 5.5 = Yes  Run a short distance —> 0 = No  Do light work around the house —> 2.7 = Yes  Do moderate work around the house —> 3.5 = Yes  Do heavy work around the house —> 0 = No  Do yardwork —> 0 = No  Have sexual relations —> 5.25 = Yes  Participate in moderate recreational activities —> 0 = No  Participate in strenuous sports —> 0 = No    Revised Cardiac Risk Index for Pre-Operative Risk from [employer]   RESULT SUMMARY:  2 points  Class III Risk    10.1 %  30-day risk of death, MI, or cardiac arrest    From [employer]pe 2017. These numbers are higher than those from the original study (Abdiel 1999). See Evidence for details.      INPUTS:  Elevated-risk surgery —> 0 = No  History of ischemic heart disease —> 1 = Yes  History of congestive heart failure —> 0 = No  History of cerebrovascular disease —> 1 = Yes  Pre-operative treatment with insulin —> 0 = No  Pre-operative creatinine >2 mg/dL / 176.8 µmol/L —> 0 = No   pt with hx kendra- does not use cpap and cannot tolerate mask  per pt for inspire placement      PATIENT CURRENTLY DENIES CHEST PAIN  SHORTNESS OF BREATH  PALPITATIONS,  DYSURIA, OR UPPER RESPIRATORY INFECTION IN PAST 2 WEEKS  denies travel outside the [place] in the past 30 days  Patient denies any signs or symptoms of COVID 19 and denies contact with known positive individuals.  They have an appointment for repeat COVID testing pre-procedure and acknowledge its time and place.  They were instructed to quarantine pre-procedure, practice exposure control measures, continue to self-monitor and report any concerns to their proceduralist.  pt advised self quarantine till day of procedure    Anesthesia Alert  Difficult Airway class IV   NO--History of neck surgery or radiation  NO--Limited ROM of neck  NO--History of Malignant hyperthermia  NO--No personal or family history of Pseudocholinesterase deficiency.  NO--Prior Anesthesia Complication  NO--Latex Allergy  NO--Loose teeth  NO--History of Rheumatoid Arthritis  +Bleeding risk plavix   + KENDRA  NO--Other_____    PT DENIES ANY RASHES, ABRASION, OR OPEN WOUNDS OR CUTS    AS PER THE PT, THIS IS HIS/HER COMPLETE MEDICAL AND SURGICAL HX, INCLUDING MEDICATIONS PRESCRIBED AND OVER THE COUNTER    Patient verbalized understanding of instructions and was given the opportunity to ask questions and have them answered.    pt denies any suicidal ideation or thoughts, pt states not a threat to self or others    Obstructive sleep apnea syndrome    Encounter for other preprocedural examination    FH: stomach cancer (Father)    FH: CAD (coronary artery disease) (Mother)    Nonintractable epilepsy without status epilepticus, unspecified epilepsy type    Neuropathy    Hypertension, unspecified type    Type 2 diabetes mellitus with complication, without long-term current use of insulin    Anxiety    Cerebrovascular accident (CVA), unspecified mechanism    Seizures    TIA (transient ischemic attack)    Hypercholesteremia    Overactive bladder    Depression    BPH (benign prostatic hyperplasia)    Obese    KENDRA on CPAP    History of surgery    Capsular cataract of right eye    S/P ORIF (open reduction internal fixation) fracture    [id number]    SysAdmin_VstLnk    Duke Activity Status Index (DASI) from [email or web address]  RESULT SUMMARY:  24.2 points  The higher the score (maximum 58.2), the higher the functional status.    5.72 METs        INPUTS:  Take care of self —> 2.75 = Yes  Walk indoors —> 1.75 = Yes  Walk 1&ndash;2 blocks on level ground —> 2.75 = Yes  Climb a flight of stairs or walk up a hill —> 5.5 = Yes  Run a short distance —> 0 = No  Do light work around the house —> 2.7 = Yes  Do moderate work around the house —> 3.5 = Yes  Do heavy work around the house —> 0 = No  Do yardwork —> 0 = No  Have sexual relations —> 5.25 = Yes  Participate in moderate recreational activities —> 0 = No  Participate in strenuous sports —> 0 = No    Revised Cardiac Risk Index for Pre-Operative Risk from [employer]   RESULT SUMMARY:  2 points  Class III Risk    10.1 %  30-day risk of death, MI, or cardiac arrest    From [employer]ppe 2017. These numbers are higher than those from the original study (Abdiel 1999). See Evidence for details.      INPUTS:  Elevated-risk surgery —> 0 = No  History of ischemic heart disease —> 1 = Yes  History of congestive heart failure —> 0 = No  History of cerebrovascular disease —> 1 = Yes  Pre-operative treatment with insulin —> 0 = No  Pre-operative creatinine >2 mg/dL / 176.8 µmol/L —> 0 = No   pt with hx kendra- does not use cpap and cannot tolerate mask  per pt for inspire placement      PATIENT CURRENTLY DENIES CHEST PAIN  SHORTNESS OF BREATH  PALPITATIONS,  DYSURIA, OR UPPER RESPIRATORY INFECTION IN PAST 2 WEEKS  denies travel outside the [place] in the past 30 days  Patient denies any signs or symptoms of COVID 19 and denies contact with known positive individuals.  They have an appointment for repeat COVID testing pre-procedure and acknowledge its time and place.  They were instructed to quarantine pre-procedure, practice exposure control measures, continue to self-monitor and report any concerns to their proceduralist.  pt advised self quarantine till day of procedure    Anesthesia Alert  Difficult Airway class IV   NO--History of neck surgery or radiation  NO--Limited ROM of neck  NO--History of Malignant hyperthermia  NO--No personal or family history of Pseudocholinesterase deficiency.  NO--Prior Anesthesia Complication  NO--Latex Allergy  NO--Loose teeth  NO--History of Rheumatoid Arthritis  +Bleeding risk plavix   + KENDRA  NO--Other_____    PT DENIES ANY RASHES, ABRASION, OR OPEN WOUNDS OR CUTS    AS PER THE PT, THIS IS HIS/HER COMPLETE MEDICAL AND SURGICAL HX, INCLUDING MEDICATIONS PRESCRIBED AND OVER THE COUNTER    Patient verbalized understanding of instructions and was given the opportunity to ask questions and have them answered.    pt denies any suicidal ideation or thoughts, pt states not a threat to self or others    Obstructive sleep apnea syndrome    Encounter for other preprocedural examination    FH: stomach cancer (Father)    FH: CAD (coronary artery disease) (Mother)    Nonintractable epilepsy without status epilepticus, unspecified epilepsy type    Neuropathy    Hypertension, unspecified type    Type 2 diabetes mellitus with complication, without long-term current use of insulin    Anxiety    Cerebrovascular accident (CVA), unspecified mechanism    Seizures    TIA (transient ischemic attack)    Hypercholesteremia    Overactive bladder    Depression    BPH (benign prostatic hyperplasia)    Obese    KENDRA on CPAP    History of surgery    Capsular cataract of right eye    S/P ORIF (open reduction internal fixation) fracture    [id number]    SysAdmin_VstLnk    Duke Activity Status Index (DASI) from [email or web address]  RESULT SUMMARY:  24.2 points  The higher the score (maximum 58.2), the higher the functional status.    5.72 METs        INPUTS:  Take care of self —> 2.75 = Yes  Walk indoors —> 1.75 = Yes  Walk 1&ndash;2 blocks on level ground —> 2.75 = Yes  Climb a flight of stairs or walk up a hill —> 5.5 = Yes  Run a short distance —> 0 = No  Do light work around the house —> 2.7 = Yes  Do moderate work around the house —> 3.5 = Yes  Do heavy work around the house —> 0 = No  Do yardwork —> 0 = No  Have sexual relations —> 5.25 = Yes  Participate in moderate recreational activities —> 0 = No  Participate in strenuous sports —> 0 = No    Revised Cardiac Risk Index for Pre-Operative Risk from [employer]   RESULT SUMMARY:  2 points  Class III Risk    10.1 %  30-day risk of death, MI, or cardiac arrest    From [employer]ppe 2017. These numbers are higher than those from the original study (Abdiel 1999). See Evidence for details.      INPUTS:  Elevated-risk surgery —> 0 = No  History of ischemic heart disease —> 1 = Yes  History of congestive heart failure —> 0 = No  History of cerebrovascular disease —> 1 = Yes  Pre-operative treatment with insulin —> 0 = No  Pre-operative creatinine >2 mg/dL / 176.8 µmol/L —> 0 = No   pt with hx kendra- does not use cpap and cannot tolerate mask  per pt for inspire placement      PATIENT CURRENTLY DENIES CHEST PAIN  SHORTNESS OF BREATH  PALPITATIONS,  DYSURIA, OR UPPER RESPIRATORY INFECTION IN PAST 2 WEEKS  denies travel outside the [place] in the past 30 days  Patient denies any signs or symptoms of COVID 19 and denies contact with known positive individuals.  They have an appointment for repeat COVID testing pre-procedure and acknowledge its time and place.  They were instructed to quarantine pre-procedure, practice exposure control measures, continue to self-monitor and report any concerns to their proceduralist.  pt advised self quarantine till day of procedure    Anesthesia Alert  Difficult Airway class IV   NO--History of neck surgery or radiation  NO--Limited ROM of neck  NO--History of Malignant hyperthermia  NO--No personal or family history of Pseudocholinesterase deficiency.  NO--Prior Anesthesia Complication  NO--Latex Allergy  NO--Loose teeth  NO--History of Rheumatoid Arthritis  +Bleeding risk plavix   + KENDRA  NO--Other_____    PT DENIES ANY RASHES, ABRASION, OR OPEN WOUNDS OR CUTS    AS PER THE PT, THIS IS HIS/HER COMPLETE MEDICAL AND SURGICAL HX, INCLUDING MEDICATIONS PRESCRIBED AND OVER THE COUNTER    Patient verbalized understanding of instructions and was given the opportunity to ask questions and have them answered.    pt denies any suicidal ideation or thoughts, pt states not a threat to self or others    Obstructive sleep apnea syndrome    Encounter for other preprocedural examination    FH: stomach cancer (Father)    FH: CAD (coronary artery disease) (Mother)    Nonintractable epilepsy without status epilepticus, unspecified epilepsy type    Neuropathy    Hypertension, unspecified type    Type 2 diabetes mellitus with complication, without long-term current use of insulin    Anxiety    Cerebrovascular accident (CVA), unspecified mechanism    Seizures    TIA (transient ischemic attack)    Hypercholesteremia    Overactive bladder    Depression    BPH (benign prostatic hyperplasia)    Obese    KENDRA on CPAP    History of surgery    Capsular cataract of right eye    S/P ORIF (open reduction internal fixation) fracture    [id number]    SysAdmin_VstLnk    Duke Activity Status Index (DASI) from [email or web address]  RESULT SUMMARY:  24.2 points  The higher the score (maximum 58.2), the higher the functional status.    5.72 METs        INPUTS:  Take care of self —> 2.75 = Yes  Walk indoors —> 1.75 = Yes  Walk 1&ndash;2 blocks on level ground —> 2.75 = Yes  Climb a flight of stairs or walk up a hill —> 5.5 = Yes  Run a short distance —> 0 = No  Do light work around the house —> 2.7 = Yes  Do moderate work around the house —> 3.5 = Yes  Do heavy work around the house —> 0 = No  Do yardwork —> 0 = No  Have sexual relations —> 5.25 = Yes  Participate in moderate recreational activities —> 0 = No  Participate in strenuous sports —> 0 = No    Revised Cardiac Risk Index for Pre-Operative Risk from [employer]   RESULT SUMMARY:  2 points  Class III Risk    10.1 %  30-day risk of death, MI, or cardiac arrest    From [employer]ppe 2017. These numbers are higher than those from the original study (Abdiel 1999). See Evidence for details.      INPUTS:  Elevated-risk surgery —> 0 = No  History of ischemic heart disease —> 1 = Yes  History of congestive heart failure —> 0 = No  History of cerebrovascular disease —> 1 = Yes  Pre-operative treatment with insulin —> 0 = No  Pre-operative creatinine >2 mg/dL / 176.8 µmol/L —> 0 = No   pt with hx kendra- does not use cpap and cannot tolerate mask  per pt for inspire placement      PATIENT CURRENTLY DENIES CHEST PAIN  SHORTNESS OF BREATH  PALPITATIONS,  DYSURIA, OR UPPER RESPIRATORY INFECTION IN PAST 2 WEEKS  denies travel outside the [place] in the past 30 days  Patient denies any signs or symptoms of COVID 19 and denies contact with known positive individuals.  They have an appointment for repeat COVID testing pre-procedure and acknowledge its time and place.  They were instructed to quarantine pre-procedure, practice exposure control measures, continue to self-monitor and report any concerns to their proceduralist.  pt advised self quarantine till day of procedure    Anesthesia Alert  Difficult Airway class IV   NO--History of neck surgery or radiation  NO--Limited ROM of neck  NO--History of Malignant hyperthermia  NO--No personal or family history of Pseudocholinesterase deficiency.  NO--Prior Anesthesia Complication  NO--Latex Allergy  NO--Loose teeth  NO--History of Rheumatoid Arthritis  +Bleeding risk plavix   + KENDRA  NO--Other_____    PT DENIES ANY RASHES, ABRASION, OR OPEN WOUNDS OR CUTS    AS PER THE PT, THIS IS HIS/HER COMPLETE MEDICAL AND SURGICAL HX, INCLUDING MEDICATIONS PRESCRIBED AND OVER THE COUNTER    Patient verbalized understanding of instructions and was given the opportunity to ask questions and have them answered.    pt denies any suicidal ideation or thoughts, pt states not a threat to self or others    Obstructive sleep apnea syndrome    Encounter for other preprocedural examination    FH: stomach cancer (Father)    FH: CAD (coronary artery disease) (Mother)    Nonintractable epilepsy without status epilepticus, unspecified epilepsy type    Neuropathy    Hypertension, unspecified type    Type 2 diabetes mellitus with complication, without long-term current use of insulin    Anxiety    Cerebrovascular accident (CVA), unspecified mechanism    Seizures    TIA (transient ischemic attack)    Hypercholesteremia    Overactive bladder    Depression    BPH (benign prostatic hyperplasia)    Obese    KENDRA on CPAP    History of surgery    Capsular cataract of right eye    S/P ORIF (open reduction internal fixation) fracture    [id number]    SysAdmin_VstLnk    Duke Activity Status Index (DASI) from [email or web address]  RESULT SUMMARY:  24.2 points  The higher the score (maximum 58.2), the higher the functional status.    5.72 METs        INPUTS:  Take care of self —> 2.75 = Yes  Walk indoors —> 1.75 = Yes  Walk 1&ndash;2 blocks on level ground —> 2.75 = Yes  Climb a flight of stairs or walk up a hill —> 5.5 = Yes  Run a short distance —> 0 = No  Do light work around the house —> 2.7 = Yes  Do moderate work around the house —> 3.5 = Yes  Do heavy work around the house —> 0 = No  Do yardwork —> 0 = No  Have sexual relations —> 5.25 = Yes  Participate in moderate recreational activities —> 0 = No  Participate in strenuous sports —> 0 = No    Revised Cardiac Risk Index for Pre-Operative Risk from [email or web address]   RESULT SUMMARY:  2 points  Class III Risk    10.1 %  30-day risk of death, MI, or cardiac arrest    From Duceppe 2017. These numbers are higher than those from the original study (Abdiel 1999). See Evidence for details.      INPUTS:  Elevated-risk surgery —> 0 = No  History of ischemic heart disease —> 1 = Yes  History of congestive heart failure —> 0 = No  History of cerebrovascular disease —> 1 = Yes  Pre-operative treatment with insulin —> 0 = No  Pre-operative creatinine >2 mg/dL / 176.8 µmol/L —> 0 = No

## 2024-01-04 NOTE — H&P PST ADULT - REASON FOR ADMISSION
Patient is a  year old  male presenting to PAST in preparation for   on   under anesthesia by Dr. Elam Patient is a 72  year old  male presenting to PAST in preparation for sleep endoscopy   on 1/16/24  under general anesthesia by Dr. moore

## 2024-01-04 NOTE — H&P PST ADULT - NSICDXPASTMEDICALHX_GEN_ALL_CORE_FT
PAST MEDICAL HISTORY:  Anxiety     BPH (benign prostatic hyperplasia)     CAD (coronary artery disease)     Depression NO SUICIDAL THOUGHTS    Hypercholesteremia     Hypertension, unspecified type     Neuropathy     Obese BMI=32.9    ADAIR on CPAP     Overactive bladder     Seizures LAST SEIZURE 2016    TIA (transient ischemic attack) 2009    Type 2 diabetes mellitus with complication, without long-term current use of insulin

## 2024-01-05 DIAGNOSIS — Z01.818 ENCOUNTER FOR OTHER PREPROCEDURAL EXAMINATION: ICD-10-CM

## 2024-01-05 DIAGNOSIS — G47.33 OBSTRUCTIVE SLEEP APNEA (ADULT) (PEDIATRIC): ICD-10-CM

## 2024-01-15 NOTE — ASU PATIENT PROFILE, ADULT - FALL HARM RISK - HARM RISK INTERVENTIONS
Assistance with ambulation/Assistance OOB with selected safe patient handling equipment/Communicate Risk of Fall with Harm to all staff/Discuss with provider need for PT consult/Monitor gait and stability/Provide patient with walking aids - walker, cane, crutches/Reinforce activity limits and safety measures with patient and family/Tailored Fall Risk Interventions/Visual Cue: Yellow wristband and red socks/Bed in lowest position, wheels locked, appropriate side rails in place/Call bell, personal items and telephone in reach/Instruct patient to call for assistance before getting out of bed or chair/Non-slip footwear when patient is out of bed/[place] to call system/Physically safe environment - no spills, clutter or unnecessary equipment/Purposeful Proactive Rounding/Room/bathroom lighting operational, light cord in reach Assistance with ambulation/Assistance OOB with selected safe patient handling equipment/Communicate Risk of Fall with Harm to all staff/Discuss with provider need for PT consult/Monitor gait and stability/Provide patient with walking aids - walker, cane, crutches/Reinforce activity limits and safety measures with patient and family/Tailored Fall Risk Interventions/Visual Cue: Yellow wristband and red socks/Bed in lowest position, wheels locked, appropriate side rails in place/Call bell, personal items and telephone in reach/Instruct patient to call for assistance before getting out of bed or chair/Non-slip footwear when patient is out of bed/[place] to call system/Physically safe environment - no spills, clutter or unnecessary equipment/Purposeful Proactive Rounding/Room/bathroom lighting operational, light cord in reach

## 2024-01-16 ENCOUNTER — APPOINTMENT (OUTPATIENT)
Age: 74
End: 2024-01-16

## 2024-01-16 ENCOUNTER — TRANSCRIPTION ENCOUNTER (OUTPATIENT)
Age: 74
End: 2024-01-16

## 2024-01-16 ENCOUNTER — OUTPATIENT (OUTPATIENT)
Dept: OUTPATIENT SERVICES | Facility: HOSPITAL | Age: 74
LOS: 1 days | Discharge: HOME | End: 2024-01-16
Payer: MEDICARE

## 2024-01-16 VITALS
WEIGHT: 229.94 LBS | HEIGHT: 66 IN | RESPIRATION RATE: 17 BRPM | OXYGEN SATURATION: 99 % | DIASTOLIC BLOOD PRESSURE: 80 MMHG | TEMPERATURE: 98 F | HEART RATE: 60 BPM | SYSTOLIC BLOOD PRESSURE: 173 MMHG

## 2024-01-16 VITALS
HEART RATE: 62 BPM | TEMPERATURE: 98 F | RESPIRATION RATE: 15 BRPM | OXYGEN SATURATION: 100 % | DIASTOLIC BLOOD PRESSURE: 77 MMHG | SYSTOLIC BLOOD PRESSURE: 153 MMHG

## 2024-01-16 DIAGNOSIS — H26.9 UNSPECIFIED CATARACT: Chronic | ICD-10-CM

## 2024-01-16 DIAGNOSIS — Z98.890 OTHER SPECIFIED POSTPROCEDURAL STATES: Chronic | ICD-10-CM

## 2024-01-16 DIAGNOSIS — G47.33 OBSTRUCTIVE SLEEP APNEA (ADULT) (PEDIATRIC): ICD-10-CM

## 2024-01-16 PROCEDURE — 42975 DISE EVAL SLP DO BRTH FLX DX: CPT

## 2024-01-16 NOTE — ASU DISCHARGE PLAN (ADULT/PEDIATRIC) - FOLLOW UP APPOINTMENTS
[facility]:  [facility]:  [facility]

## 2024-01-16 NOTE — CHART NOTE - NSCHARTNOTEFT_GEN_A_CORE
PACU ANESTHESIA ADMISSION NOTE      Procedure: Sleep endoscopy      Post op diagnosis:  Obstructive sleep apnea        ____  Intubated  TV:______       Rate: ______      FiO2: ______    __x__  Patent Airway    __x__  Full return of protective reflexes    __x__  Full recovery from anesthesia / back to baseline     Vitals:   T:  37         R: 18                 BP: 124/83                 Sat:  100                 P: 67      Mental Status:  __x__ Awake   ___x__ Alert   _____ Drowsy   _____ Sedated    Nausea/Vomiting:  _x___ NO  ______Yes,   See Post - Op Orders          Pain Scale (0-10):  _____    Treatment: __x__ None    ____ See Post - Op/PCA Orders    Post - Operative Fluids:   ____ Oral   ___x_ See Post - Op Orders    Plan: Discharge:   __x__Home       _____Floor     _____Critical Care    _____  Other:_________________    Comments:    Uneventful anesthesia. Patient transported to  spontaneously breathing and hemodynamically stable.

## 2024-01-16 NOTE — PRE-ANESTHESIA EVALUATION ADULT - NSDENTALSD_ENT_ALL_CORE
appears normal and intact decayed, slightly chipped #8,9/appears normal and intact/caps/bridge/implants/missing teeth

## 2024-01-16 NOTE — ASU DISCHARGE PLAN (ADULT/PEDIATRIC) - NS MD DC FALL RISK RISK
For information on Fall & Injury Prevention, visit: [email or web address] OR  [email or web address] OR  https://www.cdc.gov/steadi/patient.html For information on Fall & Injury Prevention, visit: [email or web address]-[email or web address] OR  [email or web address]/[email or web address]-injury OR  https://www.cdc.gov/steadi/patient.html

## 2024-01-16 NOTE — ASU DISCHARGE PLAN (ADULT/PEDIATRIC) - PROVIDER TOKENS
PROVIDER:[TOKEN:[[id number]],FOLLOWUP:[2 weeks],ESTABLISHEDPATIENT:[T]] PROVIDER:[TOKEN:[[id number]],FOLLOWUP:[2 weeks],ESTABLISHEDPATIENT:[T]]

## 2024-01-16 NOTE — ASU PREOP CHECKLIST - AS BP NONINV METHOD
Mom calling to find out about pts surgery that was scheduled for 7/6 with Dr Isaac Helms - upset she has not heard anything in a week - see other closed encounter electronic

## 2024-01-16 NOTE — PRE-ANESTHESIA EVALUATION ADULT - NSANTHPMHFT_GEN_ALL_CORE
METS>4 without CP/palpitations/sob  Denies orthopnea  last seizure METS>4 without CP/palpitations/sob - activity limited by balance issues requiring walker  Denies orthopnea  last seizure in 2016, well controlled. Had CVA that led to seizures post-stroke. Residual balance issues after CVA.

## 2024-01-16 NOTE — ASU DISCHARGE PLAN (ADULT/PEDIATRIC) - CARE PROVIDER_API CALL
David Hahn  Head/Neck Surgery  [address] [place] [id number]  Phone: [phone]  Fax: [phone]  Established Patient  Follow Up Time: 2 weeks   David Hahn  Head/Neck Surgery  [address] [place] [id number]  Phone: [phone]  Fax: [phone]  Established Patient  Follow Up Time: 2 weeks

## 2024-01-17 PROBLEM — I25.10 ATHEROSCLEROTIC HEART DISEASE OF NATIVE CORONARY ARTERY WITHOUT ANGINA PECTORIS: Chronic | Status: ACTIVE | Noted: 2024-01-04

## 2024-01-18 DIAGNOSIS — Z79.85 LONG-TERM (CURRENT) USE OF INJECTABLE NON-INSULIN ANTIDIABETIC DRUGS: ICD-10-CM

## 2024-01-18 DIAGNOSIS — E11.40 TYPE 2 DIABETES MELLITUS WITH DIABETIC NEUROPATHY, UNSPECIFIED: ICD-10-CM

## 2024-01-18 DIAGNOSIS — R56.9 UNSPECIFIED CONVULSIONS: ICD-10-CM

## 2024-01-18 DIAGNOSIS — I10 ESSENTIAL (PRIMARY) HYPERTENSION: ICD-10-CM

## 2024-01-18 DIAGNOSIS — E78.00 PURE HYPERCHOLESTEROLEMIA, UNSPECIFIED: ICD-10-CM

## 2024-01-18 DIAGNOSIS — Z86.73 PERSONAL HISTORY OF TRANSIENT ISCHEMIC ATTACK (TIA), AND CEREBRAL INFARCTION WITHOUT RESIDUAL DEFICITS: ICD-10-CM

## 2024-01-18 DIAGNOSIS — G47.33 OBSTRUCTIVE SLEEP APNEA (ADULT) (PEDIATRIC): ICD-10-CM

## 2024-01-18 DIAGNOSIS — E66.9 OBESITY, UNSPECIFIED: ICD-10-CM

## 2024-01-18 DIAGNOSIS — I25.10 ATHEROSCLEROTIC HEART DISEASE OF NATIVE CORONARY ARTERY WITHOUT ANGINA PECTORIS: ICD-10-CM

## 2024-01-19 ENCOUNTER — APPOINTMENT (OUTPATIENT)
Dept: UROLOGY | Facility: CLINIC | Age: 74
End: 2024-01-19
Payer: MEDICARE

## 2024-01-19 VITALS
BODY MASS INDEX: 34.87 KG/M2 | RESPIRATION RATE: 17 BRPM | DIASTOLIC BLOOD PRESSURE: 71 MMHG | SYSTOLIC BLOOD PRESSURE: 142 MMHG | TEMPERATURE: 97.1 F | HEIGHT: 66 IN | HEART RATE: 62 BPM | WEIGHT: 217 LBS

## 2024-01-19 LAB
BILIRUB UR QL STRIP: NORMAL
COLLECTION METHOD: NORMAL
GLUCOSE UR-MCNC: NORMAL
HCG UR QL: 0.2 EU/DL
HGB UR QL STRIP.AUTO: NORMAL
KETONES UR-MCNC: NORMAL
LEUKOCYTE ESTERASE UR QL STRIP: NORMAL
NITRITE UR QL STRIP: NORMAL
PH UR STRIP: 6
PROT UR STRIP-MCNC: NORMAL
SP GR UR STRIP: 1.02

## 2024-01-19 PROCEDURE — 99214 OFFICE O/P EST MOD 30 MIN: CPT

## 2024-01-19 PROCEDURE — 81003 URINALYSIS AUTO W/O SCOPE: CPT | Mod: QW

## 2024-01-19 NOTE — ASSESSMENT
[FreeTextEntry1] : 1. BPH, urinary frequency maintained on Gemtasa and Finasteride 2.s/p PTNS  Plan: -continue meds -we will schedule pt for next monthly PTNS treatment

## 2024-01-19 NOTE — PHYSICAL EXAM
[Normal Appearance] : normal appearance [Well Groomed] : well groomed [General Appearance - In No Acute Distress] : no acute distress [Respiration, Rhythm And Depth] : normal respiratory rhythm and effort [Exaggerated Use Of Accessory Muscles For Inspiration] : no accessory muscle use [Abdomen Soft] : soft [Abdomen Tenderness] : non-tender [Costovertebral Angle Tenderness] : no ~M costovertebral angle tenderness [] : no rash [No Focal Deficits] : no focal deficits [Oriented To Time, Place, And Person] : oriented to person, place, and time [Affect] : the affect was normal [Mood] : the mood was normal [de-identified] : ambulates with RW

## 2024-01-19 NOTE — HISTORY OF PRESENT ILLNESS
[FreeTextEntry1] : 73 y.o male with h/o BPH and urinary frequency- here for f/u pt is maintained on Gemtasa and Finasteride he is also s/p PTNS treatment- he has been on monthly maintenance- last treatment 11/2023 he reports no improvement in his urinary symtpoms but when questioned furhter he reports nocturia 2x He only needs to wear a depends when he goes out and doesnt have easy bnathroom acess If he is hime he does not need a depends  Udip 1/19/24- neg PSA-  1.0  3/2023-    **on finasteride 5mg

## 2024-01-19 NOTE — END OF VISIT
[FreeTextEntry3] : I obtained history, formulated treatment plan which I discussed with the patient agree with above transcription of the physicians assistant

## 2024-02-07 ENCOUNTER — APPOINTMENT (OUTPATIENT)
Dept: OTOLARYNGOLOGY | Facility: CLINIC | Age: 74
End: 2024-02-07
Payer: MEDICARE

## 2024-02-07 DIAGNOSIS — M48.02 SPINAL STENOSIS, CERVICAL REGION: ICD-10-CM

## 2024-02-07 PROCEDURE — G2211 COMPLEX E/M VISIT ADD ON: CPT

## 2024-02-07 PROCEDURE — 99214 OFFICE O/P EST MOD 30 MIN: CPT

## 2024-02-07 NOTE — ASSESSMENT
[FreeTextEntry1] : Severe ADAIR intolerant of CPAP BMI 35 AHI 60 DISE with pure anterior posterior collapse, deemed a candidate  Plan for Inspire implant Risks of neck surgery were discussed including unsightly scarring, bleeding, neurovascular injury including CN11 and CN12,  infection, general anesthesia, and need for reoperation

## 2024-02-07 NOTE — HISTORY OF PRESENT ILLNESS
[FreeTextEntry1] : Patient presents today s/p DISE. HE states his symptoms are unchanged and he states he is doing well after his procedure. No further complaints.

## 2024-02-14 ENCOUNTER — APPOINTMENT (OUTPATIENT)
Dept: UROLOGY | Facility: CLINIC | Age: 74
End: 2024-02-14
Payer: MEDICARE

## 2024-02-14 DIAGNOSIS — R35.0 FREQUENCY OF MICTURITION: ICD-10-CM

## 2024-02-14 PROCEDURE — 64566 NEUROELTRD STIM POST TIBIAL: CPT

## 2024-03-11 NOTE — H&P PST ADULT - FUNCTIONAL ASSESSMENT - DAILY ACTIVITY PT AGE POP HIDDEN
Katherine Pizano  [facility] Physician [facility]  UROGYN [address]  Scheduled Appointment: 04/17/2024    
Adult

## 2024-03-13 ENCOUNTER — APPOINTMENT (OUTPATIENT)
Dept: UROLOGY | Facility: CLINIC | Age: 74
End: 2024-03-13
Payer: MEDICARE

## 2024-03-13 DIAGNOSIS — N13.8 BENIGN PROSTATIC HYPERPLASIA WITH LOWER URINARY TRACT SYMPMS: ICD-10-CM

## 2024-03-13 DIAGNOSIS — N32.81 OVERACTIVE BLADDER: ICD-10-CM

## 2024-03-13 DIAGNOSIS — N40.1 BENIGN PROSTATIC HYPERPLASIA WITH LOWER URINARY TRACT SYMPMS: ICD-10-CM

## 2024-03-13 DIAGNOSIS — R32 UNSPECIFIED URINARY INCONTINENCE: ICD-10-CM

## 2024-03-13 PROCEDURE — 64566 NEUROELTRD STIM POST TIBIAL: CPT

## 2024-04-01 ENCOUNTER — APPOINTMENT (OUTPATIENT)
Dept: NEUROLOGY | Facility: CLINIC | Age: 74
End: 2024-04-01
Payer: MEDICARE

## 2024-04-01 VITALS
BODY MASS INDEX: 43.74 KG/M2 | HEART RATE: 69 BPM | TEMPERATURE: 97.3 F | DIASTOLIC BLOOD PRESSURE: 78 MMHG | OXYGEN SATURATION: 99 % | HEIGHT: 66 IN | RESPIRATION RATE: 20 BRPM | WEIGHT: 272.13 LBS | SYSTOLIC BLOOD PRESSURE: 140 MMHG

## 2024-04-01 DIAGNOSIS — G40.109 LOCALIZATION-RELATED (FOCAL) (PARTIAL) SYMPTOMATIC EPILEPSY AND EPILEPTIC SYNDROMES WITH SIMPLE PARTIAL SEIZURES, NOT INTRACTABLE, W/OUT STATUS EPILEPTICUS: ICD-10-CM

## 2024-04-01 DIAGNOSIS — R26.9 UNSPECIFIED ABNORMALITIES OF GAIT AND MOBILITY: ICD-10-CM

## 2024-04-01 PROCEDURE — G2211 COMPLEX E/M VISIT ADD ON: CPT

## 2024-04-01 PROCEDURE — 99214 OFFICE O/P EST MOD 30 MIN: CPT

## 2024-04-01 RX ORDER — ENALAPRIL MALEATE 10 MG/1
10 TABLET ORAL
Refills: 0 | Status: DISCONTINUED | COMMUNITY
End: 2024-04-01

## 2024-04-01 RX ORDER — CITALOPRAM HYDROBROMIDE 20 MG/1
20 TABLET, FILM COATED ORAL
Refills: 0 | Status: DISCONTINUED | COMMUNITY
End: 2024-04-01

## 2024-04-01 RX ORDER — VALSARTAN 80 MG/1
80 TABLET, COATED ORAL
Refills: 0 | Status: DISCONTINUED | COMMUNITY
End: 2024-04-01

## 2024-04-01 RX ORDER — FINASTERIDE 5 MG/1
5 TABLET, FILM COATED ORAL
Qty: 30 | Refills: 0 | Status: DISCONTINUED | COMMUNITY
Start: 2022-08-18 | End: 2024-04-01

## 2024-04-01 RX ORDER — RIFAXIMIN 550 MG/1
550 TABLET ORAL
Refills: 0 | Status: DISCONTINUED | COMMUNITY
End: 2024-04-01

## 2024-04-01 RX ORDER — OXYBUTYNIN CHLORIDE 15 MG/1
15 TABLET, EXTENDED RELEASE ORAL
Qty: 90 | Refills: 3 | Status: DISCONTINUED | COMMUNITY
Start: 2021-08-25 | End: 2024-04-01

## 2024-04-01 RX ORDER — SITAGLIPTIN AND METFORMIN HYDROCHLORIDE 100; 1000 MG/1; MG/1
100-1000 TABLET, FILM COATED, EXTENDED RELEASE ORAL
Qty: 90 | Refills: 0 | Status: DISCONTINUED | COMMUNITY
Start: 2022-03-28 | End: 2024-04-01

## 2024-04-02 NOTE — PHYSICAL EXAM
[FreeTextEntry1] : A/A/Ox3 follows 4 step commands He appears to be tired and slightly sleepy good attention good memory of the current events good recall Full ROM of EOM no drift + right fixation  mild right paretic gait no dysmetria stable gait

## 2024-04-02 NOTE — HISTORY OF PRESENT ILLNESS
[FreeTextEntry1] : Ric is here for the F/U. He is using a walker. He is now residing in assisted living facility( [facility]) He allan have his own private area/apartment. He eats with the other residents. His problem is dealing with the other residents . some as it appears are having behavioral issues.  He denies having had any seizure or events suggestive of SzHowever he does have few that he socializes with. He likes the food and some of the programs. He regularly visits his sons He is not wearing his C-PAP however he is scheduled to receive an inspire device He is gaining weight and he is also only relying on the walker to ambulate  Not complaining of pain No Falls He is not feeling very refreshed in the morning and has excessive tiredness  His sleep is also affected by his trips to the bathroom did have a blood test done on March 28 showing Hb A1c= 6.3 normal Lipid profile His levels in October were 4.3 keppra and 7 Oxcarb

## 2024-04-02 NOTE — ASSESSMENT
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

## 2024-04-10 ENCOUNTER — APPOINTMENT (OUTPATIENT)
Dept: UROLOGY | Facility: CLINIC | Age: 74
End: 2024-04-10
Payer: MEDICARE

## 2024-04-10 VITALS
TEMPERATURE: 97.3 F | SYSTOLIC BLOOD PRESSURE: 139 MMHG | BODY MASS INDEX: 43.71 KG/M2 | WEIGHT: 272 LBS | HEIGHT: 66 IN | DIASTOLIC BLOOD PRESSURE: 77 MMHG | HEART RATE: 68 BPM

## 2024-04-10 DIAGNOSIS — R39.15 URGENCY OF URINATION: ICD-10-CM

## 2024-04-10 DIAGNOSIS — N39.41 URGE INCONTINENCE: ICD-10-CM

## 2024-04-10 DIAGNOSIS — R35.0 FREQUENCY OF MICTURITION: ICD-10-CM

## 2024-04-10 PROCEDURE — 64566 NEUROELTRD STIM POST TIBIAL: CPT

## 2024-04-10 RX ORDER — CITALOPRAM 30 MG/1
30 CAPSULE ORAL
Refills: 0 | Status: ACTIVE | COMMUNITY

## 2024-04-16 ENCOUNTER — OUTPATIENT (OUTPATIENT)
Dept: OUTPATIENT SERVICES | Facility: HOSPITAL | Age: 74
LOS: 1 days | End: 2024-04-16
Payer: MEDICARE

## 2024-04-16 VITALS
DIASTOLIC BLOOD PRESSURE: 78 MMHG | HEART RATE: 70 BPM | TEMPERATURE: 98 F | SYSTOLIC BLOOD PRESSURE: 145 MMHG | RESPIRATION RATE: 18 BRPM | OXYGEN SATURATION: 97 % | HEIGHT: 67 IN | WEIGHT: 225.09 LBS

## 2024-04-16 DIAGNOSIS — Z98.890 OTHER SPECIFIED POSTPROCEDURAL STATES: Chronic | ICD-10-CM

## 2024-04-16 DIAGNOSIS — H26.9 UNSPECIFIED CATARACT: Chronic | ICD-10-CM

## 2024-04-16 DIAGNOSIS — Z01.818 ENCOUNTER FOR OTHER PREPROCEDURAL EXAMINATION: ICD-10-CM

## 2024-04-16 DIAGNOSIS — G47.33 OBSTRUCTIVE SLEEP APNEA (ADULT) (PEDIATRIC): ICD-10-CM

## 2024-04-16 LAB
A1C WITH ESTIMATED AVERAGE GLUCOSE RESULT: 6.4 % — HIGH (ref 4–5.6)
ALBUMIN SERPL ELPH-MCNC: 4.1 G/DL — SIGNIFICANT CHANGE UP (ref 3.5–5.2)
ALP SERPL-CCNC: 68 U/L — SIGNIFICANT CHANGE UP (ref 30–115)
ALT FLD-CCNC: 16 U/L — SIGNIFICANT CHANGE UP (ref 0–41)
ANION GAP SERPL CALC-SCNC: 13 MMOL/L — SIGNIFICANT CHANGE UP (ref 7–14)
APTT BLD: 33.6 SEC — SIGNIFICANT CHANGE UP (ref 27–39.2)
AST SERPL-CCNC: 18 U/L — SIGNIFICANT CHANGE UP (ref 0–41)
BASOPHILS # BLD AUTO: 0.05 K/UL — SIGNIFICANT CHANGE UP (ref 0–0.2)
BASOPHILS NFR BLD AUTO: 0.7 % — SIGNIFICANT CHANGE UP (ref 0–1)
BILIRUB SERPL-MCNC: 0.3 MG/DL — SIGNIFICANT CHANGE UP (ref 0.2–1.2)
BUN SERPL-MCNC: 17 MG/DL — SIGNIFICANT CHANGE UP (ref 10–20)
CALCIUM SERPL-MCNC: 9.1 MG/DL — SIGNIFICANT CHANGE UP (ref 8.4–10.5)
CHLORIDE SERPL-SCNC: 101 MMOL/L — SIGNIFICANT CHANGE UP (ref 98–110)
CO2 SERPL-SCNC: 25 MMOL/L — SIGNIFICANT CHANGE UP (ref 17–32)
CREAT SERPL-MCNC: 0.8 MG/DL — SIGNIFICANT CHANGE UP (ref 0.7–1.5)
EGFR: 94 ML/MIN/1.73M2 — SIGNIFICANT CHANGE UP
EOSINOPHIL # BLD AUTO: 0.27 K/UL — SIGNIFICANT CHANGE UP (ref 0–0.7)
EOSINOPHIL NFR BLD AUTO: 3.6 % — SIGNIFICANT CHANGE UP (ref 0–8)
ESTIMATED AVERAGE GLUCOSE: 137 MG/DL — HIGH (ref 68–114)
GLUCOSE SERPL-MCNC: 120 MG/DL — HIGH (ref 70–99)
HCT VFR BLD CALC: 46.7 % — SIGNIFICANT CHANGE UP (ref 42–52)
HGB BLD-MCNC: 15.6 G/DL — SIGNIFICANT CHANGE UP (ref 14–18)
IMM GRANULOCYTES NFR BLD AUTO: 0.4 % — HIGH (ref 0.1–0.3)
INR BLD: 0.99 RATIO — SIGNIFICANT CHANGE UP (ref 0.65–1.3)
LYMPHOCYTES # BLD AUTO: 1.96 K/UL — SIGNIFICANT CHANGE UP (ref 1.2–3.4)
LYMPHOCYTES # BLD AUTO: 26.3 % — SIGNIFICANT CHANGE UP (ref 20.5–51.1)
MCHC RBC-ENTMCNC: 31.3 PG — HIGH (ref 27–31)
MCHC RBC-ENTMCNC: 33.4 G/DL — SIGNIFICANT CHANGE UP (ref 32–37)
MCV RBC AUTO: 93.6 FL — SIGNIFICANT CHANGE UP (ref 80–94)
MONOCYTES # BLD AUTO: 0.82 K/UL — HIGH (ref 0.1–0.6)
MONOCYTES NFR BLD AUTO: 11 % — HIGH (ref 1.7–9.3)
NEUTROPHILS # BLD AUTO: 4.33 K/UL — SIGNIFICANT CHANGE UP (ref 1.4–6.5)
NEUTROPHILS NFR BLD AUTO: 58 % — SIGNIFICANT CHANGE UP (ref 42.2–75.2)
NRBC # BLD: 0 /100 WBCS — SIGNIFICANT CHANGE UP (ref 0–0)
PLATELET # BLD AUTO: 174 K/UL — SIGNIFICANT CHANGE UP (ref 130–400)
PMV BLD: 10.6 FL — HIGH (ref 7.4–10.4)
POTASSIUM SERPL-MCNC: 4.5 MMOL/L — SIGNIFICANT CHANGE UP (ref 3.5–5)
POTASSIUM SERPL-SCNC: 4.5 MMOL/L — SIGNIFICANT CHANGE UP (ref 3.5–5)
PROT SERPL-MCNC: 6.6 G/DL — SIGNIFICANT CHANGE UP (ref 6–8)
PROTHROM AB SERPL-ACNC: 11.3 SEC — SIGNIFICANT CHANGE UP (ref 9.95–12.87)
RBC # BLD: 4.99 M/UL — SIGNIFICANT CHANGE UP (ref 4.7–6.1)
RBC # FLD: 12.8 % — SIGNIFICANT CHANGE UP (ref 11.5–14.5)
SODIUM SERPL-SCNC: 139 MMOL/L — SIGNIFICANT CHANGE UP (ref 135–146)
WBC # BLD: 7.46 K/UL — SIGNIFICANT CHANGE UP (ref 4.8–10.8)
WBC # FLD AUTO: 7.46 K/UL — SIGNIFICANT CHANGE UP (ref 4.8–10.8)

## 2024-04-16 PROCEDURE — 99214 OFFICE O/P EST MOD 30 MIN: CPT | Mod: 25

## 2024-04-16 PROCEDURE — 80053 COMPREHEN METABOLIC PANEL: CPT

## 2024-04-16 PROCEDURE — 83036 HEMOGLOBIN GLYCOSYLATED A1C: CPT

## 2024-04-16 PROCEDURE — 85730 THROMBOPLASTIN TIME PARTIAL: CPT

## 2024-04-16 PROCEDURE — 85610 PROTHROMBIN TIME: CPT

## 2024-04-16 PROCEDURE — 85025 COMPLETE CBC W/AUTO DIFF WBC: CPT

## 2024-04-16 PROCEDURE — 36415 COLL VENOUS BLD VENIPUNCTURE: CPT

## 2024-04-16 RX ORDER — VALSARTAN 80 MG/1
0 TABLET ORAL
Refills: 0 | DISCHARGE

## 2024-04-16 RX ORDER — LEVETIRACETAM 250 MG/1
1 TABLET, FILM COATED ORAL
Qty: 0 | Refills: 0 | DISCHARGE

## 2024-04-16 RX ORDER — SEMAGLUTIDE 0.68 MG/ML
0.25 INJECTION, SOLUTION SUBCUTANEOUS
Refills: 0 | DISCHARGE

## 2024-04-16 RX ORDER — METFORMIN HYDROCHLORIDE 850 MG/1
1 TABLET ORAL
Refills: 0 | DISCHARGE

## 2024-04-16 RX ORDER — VIBEGRON 75 MG/1
1 TABLET, FILM COATED ORAL
Refills: 0 | DISCHARGE

## 2024-04-16 RX ORDER — METOPROLOL TARTRATE 50 MG
1 TABLET ORAL
Qty: 0 | Refills: 0 | DISCHARGE

## 2024-04-16 RX ORDER — LISINOPRIL 2.5 MG/1
1 TABLET ORAL
Refills: 0 | DISCHARGE

## 2024-04-16 NOTE — H&P PST ADULT - NSICDXPASTMEDICALHX_GEN_ALL_CORE_FT
PAST MEDICAL HISTORY:  Anxiety     BPH (benign prostatic hyperplasia)     CAD (coronary artery disease)     Depression NO SUICIDAL THOUGHTS    Hypercholesteremia     Hypertension, unspecified type     Neuropathy     Obese BMI=32.9    ADAIR (obstructive sleep apnea)     ADAIR on CPAP     Overactive bladder     Seizures LAST SEIZURE 2016    TIA (transient ischemic attack) 2009    Type 2 diabetes mellitus with complication, without long-term current use of insulin      PAST MEDICAL HISTORY:  Anxiety     BPH (benign prostatic hyperplasia)     CAD (coronary artery disease)     Depression NO SUICIDAL THOUGHTS    Hypercholesteremia     Hypertension, unspecified type     Neuropathy     Obese BMI=32.9    ADAIR (obstructive sleep apnea)     Overactive bladder     Seizures LAST SEIZURE 2016    TIA (transient ischemic attack) 2009    Type 2 diabetes mellitus with complication, without long-term current use of insulin

## 2024-04-16 NOTE — H&P PST ADULT - OTHER CARE PROVIDERS
cardio dr nichols neuro ghiasian lv 4/24 note in [place] /allscripts cardio dr nichols lv 2 mos aware of the sx  neuro ghiasian lv 4/24 note in [place] /allscripts
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
## 2024-04-16 NOTE — H&P PST ADULT - REASON FOR ADMISSION
Case Type: OP Block TimeSuite: CASProceduralist: David Hahn  Confirmed Surgery DateTime: 04- - 0:00PAST DateTime: 04- - 15:00Procedure: RIGHT HYPOGLOSSAL NERVE IMPLANT (INSPIRE)  ERP?: UnavailableLaterality: RightLength of Procedure: 180 Minutes  Anesthesia Type: General

## 2024-04-17 DIAGNOSIS — G47.33 OBSTRUCTIVE SLEEP APNEA (ADULT) (PEDIATRIC): ICD-10-CM

## 2024-04-17 DIAGNOSIS — Z01.818 ENCOUNTER FOR OTHER PREPROCEDURAL EXAMINATION: ICD-10-CM

## 2024-04-30 ENCOUNTER — INPATIENT (INPATIENT)
Facility: HOSPITAL | Age: 74
LOS: 0 days | Discharge: NURSING, REHAB OR LONG-TERM CARE | DRG: 909 | End: 2024-05-01
Attending: STUDENT IN AN ORGANIZED HEALTH CARE EDUCATION/TRAINING PROGRAM | Admitting: STUDENT IN AN ORGANIZED HEALTH CARE EDUCATION/TRAINING PROGRAM
Payer: MEDICARE

## 2024-04-30 ENCOUNTER — APPOINTMENT (OUTPATIENT)
Dept: OTOLARYNGOLOGY | Facility: AMBULATORY SURGERY CENTER | Age: 74
End: 2024-04-30

## 2024-04-30 VITALS
TEMPERATURE: 98 F | RESPIRATION RATE: 18 BRPM | HEART RATE: 62 BPM | HEIGHT: 67 IN | OXYGEN SATURATION: 98 % | WEIGHT: 225.09 LBS | DIASTOLIC BLOOD PRESSURE: 79 MMHG | SYSTOLIC BLOOD PRESSURE: 163 MMHG

## 2024-04-30 DIAGNOSIS — Z96.1 PRESENCE OF INTRAOCULAR LENS: Chronic | ICD-10-CM

## 2024-04-30 DIAGNOSIS — H26.9 UNSPECIFIED CATARACT: Chronic | ICD-10-CM

## 2024-04-30 DIAGNOSIS — G47.33 OBSTRUCTIVE SLEEP APNEA (ADULT) (PEDIATRIC): ICD-10-CM

## 2024-04-30 DIAGNOSIS — Z98.890 OTHER SPECIFIED POSTPROCEDURAL STATES: Chronic | ICD-10-CM

## 2024-04-30 LAB — GLUCOSE BLDC GLUCOMTR-MCNC: 110 MG/DL — HIGH (ref 70–99)

## 2024-04-30 PROCEDURE — 70360 X-RAY EXAM OF NECK: CPT

## 2024-04-30 PROCEDURE — 71045 X-RAY EXAM CHEST 1 VIEW: CPT | Mod: 26

## 2024-04-30 PROCEDURE — 71045 X-RAY EXAM CHEST 1 VIEW: CPT

## 2024-04-30 PROCEDURE — 87635 SARS-COV-2 COVID-19 AMP PRB: CPT

## 2024-04-30 PROCEDURE — 70360 X-RAY EXAM OF NECK: CPT | Mod: 26

## 2024-04-30 RX ORDER — ONDANSETRON 8 MG/1
4 TABLET, FILM COATED ORAL ONCE
Refills: 0 | Status: DISCONTINUED | OUTPATIENT
Start: 2024-04-30 | End: 2024-05-01

## 2024-04-30 RX ORDER — VALSARTAN 80 MG/1
160 TABLET ORAL DAILY
Refills: 0 | Status: DISCONTINUED | OUTPATIENT
Start: 2024-04-30 | End: 2024-05-01

## 2024-04-30 RX ORDER — OXCARBAZEPINE 300 MG/1
300 TABLET, FILM COATED ORAL
Refills: 0 | Status: DISCONTINUED | OUTPATIENT
Start: 2024-04-30 | End: 2024-05-01

## 2024-04-30 RX ORDER — LANOLIN ALCOHOL/MO/W.PET/CERES
3 CREAM (GRAM) TOPICAL AT BEDTIME
Refills: 0 | Status: DISCONTINUED | OUTPATIENT
Start: 2024-04-30 | End: 2024-05-01

## 2024-04-30 RX ORDER — ACETAMINOPHEN 500 MG
650 TABLET ORAL EVERY 6 HOURS
Refills: 0 | Status: DISCONTINUED | OUTPATIENT
Start: 2024-04-30 | End: 2024-05-01

## 2024-04-30 RX ORDER — METOPROLOL TARTRATE 50 MG
2.5 TABLET ORAL
Refills: 0 | Status: COMPLETED | OUTPATIENT
Start: 2024-04-30 | End: 2024-04-30

## 2024-04-30 RX ORDER — LISINOPRIL 2.5 MG/1
20 TABLET ORAL DAILY
Refills: 0 | Status: DISCONTINUED | OUTPATIENT
Start: 2024-04-30 | End: 2024-05-01

## 2024-04-30 RX ORDER — HYDROMORPHONE HYDROCHLORIDE 2 MG/ML
0.25 INJECTION INTRAMUSCULAR; INTRAVENOUS; SUBCUTANEOUS
Refills: 0 | Status: DISCONTINUED | OUTPATIENT
Start: 2024-04-30 | End: 2024-05-01

## 2024-04-30 RX ORDER — LABETALOL HCL 100 MG
10 TABLET ORAL
Refills: 0 | Status: DISCONTINUED | OUTPATIENT
Start: 2024-04-30 | End: 2024-05-01

## 2024-04-30 RX ORDER — SODIUM CHLORIDE 9 MG/ML
1000 INJECTION, SOLUTION INTRAVENOUS
Refills: 0 | Status: DISCONTINUED | OUTPATIENT
Start: 2024-04-30 | End: 2024-05-01

## 2024-04-30 RX ORDER — METOPROLOL TARTRATE 50 MG
25 TABLET ORAL
Refills: 0 | Status: DISCONTINUED | OUTPATIENT
Start: 2024-04-30 | End: 2024-05-01

## 2024-04-30 RX ORDER — FINASTERIDE 5 MG/1
5 TABLET, FILM COATED ORAL DAILY
Refills: 0 | Status: DISCONTINUED | OUTPATIENT
Start: 2024-04-30 | End: 2024-05-01

## 2024-04-30 RX ORDER — ATORVASTATIN CALCIUM 80 MG/1
40 TABLET, FILM COATED ORAL AT BEDTIME
Refills: 0 | Status: DISCONTINUED | OUTPATIENT
Start: 2024-04-30 | End: 2024-05-01

## 2024-04-30 RX ORDER — ACETAMINOPHEN 500 MG
1000 TABLET ORAL ONCE
Refills: 0 | Status: COMPLETED | OUTPATIENT
Start: 2024-04-30 | End: 2024-04-30

## 2024-04-30 RX ORDER — METFORMIN HYDROCHLORIDE 850 MG/1
1000 TABLET ORAL DAILY
Refills: 0 | Status: DISCONTINUED | OUTPATIENT
Start: 2024-04-30 | End: 2024-05-01

## 2024-04-30 RX ORDER — OXYCODONE HYDROCHLORIDE 5 MG/1
5 TABLET ORAL ONCE
Refills: 0 | Status: DISCONTINUED | OUTPATIENT
Start: 2024-04-30 | End: 2024-05-01

## 2024-04-30 RX ORDER — LEVETIRACETAM 250 MG/1
750 TABLET, FILM COATED ORAL
Refills: 0 | Status: DISCONTINUED | OUTPATIENT
Start: 2024-04-30 | End: 2024-05-01

## 2024-04-30 RX ADMIN — Medication 1000 MILLIGRAM(S): at 22:02

## 2024-04-30 RX ADMIN — LEVETIRACETAM 750 MILLIGRAM(S): 250 TABLET, FILM COATED ORAL at 17:41

## 2024-04-30 RX ADMIN — ATORVASTATIN CALCIUM 40 MILLIGRAM(S): 80 TABLET, FILM COATED ORAL at 22:01

## 2024-04-30 RX ADMIN — Medication 25 MILLIGRAM(S): at 17:41

## 2024-04-30 RX ADMIN — Medication 2.5 MILLIGRAM(S): at 13:55

## 2024-04-30 RX ADMIN — Medication 2.5 MILLIGRAM(S): at 13:43

## 2024-04-30 RX ADMIN — OXCARBAZEPINE 300 MILLIGRAM(S): 300 TABLET, FILM COATED ORAL at 17:41

## 2024-04-30 RX ADMIN — Medication 1000 MILLIGRAM(S): at 23:00

## 2024-04-30 RX ADMIN — SODIUM CHLORIDE 100 MILLILITER(S): 9 INJECTION, SOLUTION INTRAVENOUS at 17:41

## 2024-04-30 RX ADMIN — Medication 3 MILLIGRAM(S): at 23:21

## 2024-04-30 RX ADMIN — Medication 10 MILLIGRAM(S): at 15:10

## 2024-04-30 NOTE — ASU DISCHARGE PLAN (ADULT/PEDIATRIC) - ASU DC SPECIAL INSTRUCTIONSFT
Do neck rolling exercises at least three times daily for one month.    The dressings on the neck and chest will fall off usually in a few days - this is not a problem. You can shower, just don't scrub incisions.

## 2024-04-30 NOTE — ASU DISCHARGE PLAN (ADULT/PEDIATRIC) - CARE PROVIDER_API CALL
David Hahn  Head/Neck Surgery  [address] [place] [id number]  Phone: [phone]  Fax: [phone]  Established Patient  Follow Up Time: 1 week

## 2024-04-30 NOTE — ASU PATIENT PROFILE, ADULT - NSICDXPASTSURGICALHX_GEN_ALL_CORE_FT
PAST SURGICAL HISTORY:  Bilateral artificial lens implant     Capsular cataract of right eye     S/P ORIF (open reduction internal fixation) fracture LEFT TIB-FIB

## 2024-04-30 NOTE — PROGRESS NOTE ADULT - ASSESSMENT
Pt is a 73y M POD#0 s/p R hypoglossal nerve implant (INSPIRE).    ·	Will plan to restart Plavix tomorrow  ·	Advance diet as tolerated   ·	Home meds restarted   ·	d/w Dr. Hahn      ADDENDUM:  Called by RN, pt with urge to void. Bedside bladder scan showing approx 700cc  Pt is a 73y M POD#0 s/p R hypoglossal nerve implant (INSPIRE).    ·	Will plan to restart Plavix tomorrow  ·	Advance diet as tolerated   ·	Home meds restarted   ·	d/w Dr. Hahn      ADDENDUM:  ·	Called by RN, pt with urge to void. Bedside bladder scan showing approx 700cc.   ·	Straight catheterize  Pt is a 73y M POD#0 s/p R hypoglossal nerve implant (INSPIRE).    ·	Will plan to restart Plavix tomorrow  ·	Advance diet as tolerated   ·	Home meds restarted   ·	d/w Dr. Hahn      ADDENDUM:  ·	Called by RN, pt with urge to void. Bedside bladder scan showing approx 700cc.; RN to straight cath  ·	Cont with q 4-6hr bladder scans and CIC as needed

## 2024-04-30 NOTE — PATIENT PROFILE ADULT - FALL HARM RISK - HARM RISK INTERVENTIONS
Assistance with ambulation/Assistance OOB with selected safe patient handling equipment/Communicate Risk of Fall with Harm to all staff/Discuss with provider need for PT consult/Monitor gait and stability/Provide patient with walking aids - walker, cane, crutches/Reinforce activity limits and safety measures with patient and family/Sit up slowly, dangle for a short time, stand at bedside before walking/Tailored Fall Risk Interventions/Use of alarms - bed, chair and/or voice tab/Visual Cue: Yellow wristband and red socks/Bed in lowest position, wheels locked, appropriate side rails in place/Call bell, personal items and telephone in reach/Instruct patient to call for assistance before getting out of bed or chair/Non-slip footwear when patient is out of bed/[place] to call system/Physically safe environment - no spills, clutter or unnecessary equipment/Purposeful Proactive Rounding/Room/bathroom lighting operational, light cord in reach

## 2024-04-30 NOTE — CHART NOTE - NSCHARTNOTEFT_GEN_A_CORE
PACU ANESTHESIA ADMISSION NOTE      Procedure: Right hypoglossal nerve implant - Inspire  Post op diagnosis:      ____  Intubated  TV:______       Rate: ______      FiO2: ______    _x___  Patent Airway    _x___  Full return of protective reflexes    __  Full recovery from anesthesia / back to baseline status    Vitals:            T:     98.1           BP :    178/105            R:  14            Sat:    98           P:  99      Mental Status:  _x___ Awake   _____ Alert   _____ Drowsy   _____ Sedated    Nausea/Vomiting:  _x___  NO       ______Yes,   See Post - Op Orders         Pain Scale (0-10):  __0___    Treatment: ___ None    _x___ See Post - Op/PCA Orders    Post - Operative Fluids:   ____ Oral   __x__ See Post - Op Orders    Plan: Discharge:   ___Home       __x___Floor     _____Critical Care    _____  Other:_________________    Comments:  No anesthesia issues or complications noted.  Discharge when criteria met.

## 2024-04-30 NOTE — PATIENT PROFILE ADULT - HOW PATIENT ADDRESSED, PROFILE
May 2, 2017      Forrest Clemente MD  [address] [facility] [address] [place] [address]           [facility]  [address] [facility] [address] [place] [address]  Phone: [phone]  Fax: [phone]          Patient: Dell Motley   MR Number: [id number]   YOB: 1961   Date of Visit: 5/2/2017       Dear Dr. Forrest Clemente:    Thank you for referring Dell Motley to me for evaluation. Attached you will find relevant portions of my assessment and plan of care.    If you have questions, please do not hesitate to call me. I look forward to following Dell Motley along with you.    Sincerely,    France Ford MD    Enclosure  CC:  No Recipients    If you would like to receive this communication electronically, please contact [email or web address] or [phone] to request more information on [employer] Link access.    For providers and/or their staff who would like to refer a patient to [facility], please contact us through our one-stop-shop provider referral line, [facility], at [phone].    If you feel you have received this communication in error or would no longer like to receive these types of communications, please e-mail [email or web address]         
Edwardo

## 2024-04-30 NOTE — ASU PATIENT PROFILE, ADULT - FALL HARM RISK - HARM RISK INTERVENTIONS

## 2024-04-30 NOTE — PROGRESS NOTE ADULT - SUBJECTIVE AND OBJECTIVE BOX
ENT POST-OP CHECK:    Pt is a 73y M POD#0 s/p R hypoglossal nerve implant (INSPIRE). Pt seen at bedside in RR. Pt states he is doing well, no complaints at this time. Tolerating PO liquid.     REVIEW OF SYSTEMS   [x] A ten-point review of systems was otherwise negative except as noted.      Allergies  No Known Allergies      MEDICATIONS:  acetaminophen     Tablet .. 1000 milliGRAM(s) Oral once PRN  acetaminophen     Tablet .. 650 milliGRAM(s) Oral every 6 hours PRN  atorvastatin 40 milliGRAM(s) Oral at bedtime  finasteride 5 milliGRAM(s) Oral daily  HYDROmorphone  Injectable 0.25 milliGRAM(s) IV Push every 10 minutes PRN  labetalol Injectable 10 milliGRAM(s) IV Push every 10 minutes PRN  lactated ringers. 1000 milliLiter(s) IV Continuous <Continuous>  levETIRAcetam 750 milliGRAM(s) Oral two times a day  lisinopril 20 milliGRAM(s) Oral daily  metFORMIN 1000 milliGRAM(s) Oral daily  metoprolol tartrate 25 milliGRAM(s) Oral two times a day  metoprolol tartrate Injectable 2.5 milliGRAM(s) IV Push every 5 minutes PRN  ondansetron Injectable 4 milliGRAM(s) IV Push once PRN  OXcarbazepine 300 milliGRAM(s) Oral two times a day  oxyCODONE    IR 5 milliGRAM(s) Oral once PRN  valsartan 160 milliGRAM(s) Oral daily      Vital Signs Last 24 Hrs  T(C): 36.7 (30 Apr 2024 12:18), Max: 36.7 (30 Apr 2024 12:18)  T(F): 98.1 (30 Apr 2024 12:18), Max: 98.1 (30 Apr 2024 12:18)  HR: 92 (30 Apr 2024 13:45) (62 - 103)  BP: 185/99 (30 Apr 2024 13:45) (163/79 - 188/98)  RR: 14 (30 Apr 2024 13:45) (12 - 18)  SpO2: 100% (30 Apr 2024 13:45) (98% - 100%)    Parameters below as of 30 Apr 2024 13:30  Patient On (Oxygen Delivery Method): nasal cannula  O2 Flow (L/min): 3      PHYSICAL EXAM:  GEN: NAD, awake and alert. No drooling or pooling of secretions. No stridor. Good vocal quality.  SKIN: Good color, non diaphoretic  NECK: +Dressing to R neck C/D/I; area soft, no palpable hematoma   RESP: Non-labored breathing  CHEST: +Dressing to R chest, C/D/I  EXT: CAST x 4

## 2024-05-01 ENCOUNTER — TRANSCRIPTION ENCOUNTER (OUTPATIENT)
Age: 74
End: 2024-05-01

## 2024-05-01 VITALS — DIASTOLIC BLOOD PRESSURE: 77 MMHG | SYSTOLIC BLOOD PRESSURE: 148 MMHG | HEART RATE: 74 BPM

## 2024-05-01 PROBLEM — G47.33 OBSTRUCTIVE SLEEP APNEA (ADULT) (PEDIATRIC): Chronic | Status: ACTIVE | Noted: 2024-04-16

## 2024-05-01 LAB — SARS-COV-2 RNA SPEC QL NAA+PROBE: SIGNIFICANT CHANGE UP

## 2024-05-01 RX ORDER — FINASTERIDE 5 MG/1
1 TABLET, FILM COATED ORAL
Qty: 0 | Refills: 0 | DISCHARGE
Start: 2024-05-01

## 2024-05-01 RX ORDER — OXYCODONE HYDROCHLORIDE 5 MG/1
1 TABLET ORAL
Qty: 8 | Refills: 0
Start: 2024-05-01 | End: 2024-05-03

## 2024-05-01 RX ADMIN — LEVETIRACETAM 750 MILLIGRAM(S): 250 TABLET, FILM COATED ORAL at 18:00

## 2024-05-01 RX ADMIN — OXCARBAZEPINE 300 MILLIGRAM(S): 300 TABLET, FILM COATED ORAL at 06:13

## 2024-05-01 RX ADMIN — Medication 25 MILLIGRAM(S): at 17:59

## 2024-05-01 RX ADMIN — OXCARBAZEPINE 300 MILLIGRAM(S): 300 TABLET, FILM COATED ORAL at 17:59

## 2024-05-01 RX ADMIN — LISINOPRIL 20 MILLIGRAM(S): 2.5 TABLET ORAL at 06:13

## 2024-05-01 RX ADMIN — FINASTERIDE 5 MILLIGRAM(S): 5 TABLET, FILM COATED ORAL at 11:04

## 2024-05-01 RX ADMIN — Medication 25 MILLIGRAM(S): at 06:13

## 2024-05-01 RX ADMIN — METFORMIN HYDROCHLORIDE 1000 MILLIGRAM(S): 850 TABLET ORAL at 11:05

## 2024-05-01 RX ADMIN — LEVETIRACETAM 750 MILLIGRAM(S): 250 TABLET, FILM COATED ORAL at 06:13

## 2024-05-01 RX ADMIN — VALSARTAN 160 MILLIGRAM(S): 80 TABLET ORAL at 06:13

## 2024-05-01 NOTE — DISCHARGE NOTE PROVIDER - NSDCCPCAREPLAN_GEN_ALL_CORE_FT
PRINCIPAL DISCHARGE DIAGNOSIS  Diagnosis: ADAIR (obstructive sleep apnea)  Assessment and Plan of Treatment:

## 2024-05-01 NOTE — DISCHARGE NOTE NURSING/CASE MANAGEMENT/SOCIAL WORK - NSDCPEFALRISK_GEN_ALL_CORE
For information on Fall & Injury Prevention, visit: [email or web address] OR  [email or web address] OR  https://www.cdc.gov/steadi/patient.html

## 2024-05-01 NOTE — DISCHARGE NOTE PROVIDER - NSDCMRMEDTOKEN_GEN_ALL_CORE_FT
atorvastatin 40 mg oral tablet: 1 tab(s) orally once a day (at bedtime)  citalopram 20 mg oral tablet: 1 tab(s) orally once a day  clopidogrel 75 mg oral tablet: 1 tab(s) orally once a day  finasteride 5 mg oral tablet: 1 tab(s) orally once a day (at bedtime)  Gemtesa 75 mg oral tablet: 1 tab(s) orally once a day  Glucophage 1000 mg oral tablet: 1 tab(s) orally once a day  Keppra 750 mg oral tablet: 1 tab(s) orally 2 times a day  lisinopril 20 mg oral tablet: 1 tab(s) orally once a day  metoprolol tartrate 25 mg oral tablet: 1 tab(s) orally 2 times a day  OXcarbazepine 300 mg oral tablet: 1 tab(s) orally 2 times a day  oxyCODONE 5 mg oral tablet: 1 tab(s) orally every 8 hours as needed for Moderate Pain (4 - 6) MDD: 3 tablets  Proscar 5 mg oral tablet: 1 tab(s) orally once a day  semaglutide 0.25 mg/0.5 mL (0.25 mg dose) subcutaneous solution: 0.25 milligram(s) subcutaneously once a week  valsartan 160 mg oral capsule: orally once a day (at bedtime)   atorvastatin 40 mg oral tablet: 1 tab(s) orally once a day (at bedtime)  citalopram 20 mg oral tablet: 1 tab(s) orally once a day  clopidogrel 75 mg oral tablet: 1 tab(s) orally once a day  finasteride 5 mg oral tablet: 1 tab(s) orally once a day (at bedtime)  Gemtesa 75 mg oral tablet: 1 tab(s) orally once a day  Glucophage 1000 mg oral tablet: 1 tab(s) orally once a day  Keppra 750 mg oral tablet: 1 tab(s) orally 2 times a day  lisinopril 20 mg oral tablet: 1 tab(s) orally once a day  metoprolol tartrate 25 mg oral tablet: 1 tab(s) orally 2 times a day  OXcarbazepine 300 mg oral tablet: 1 tab(s) orally 2 times a day  oxyCODONE 5 mg oral tablet: 1 tab(s) orally every 8 hours MDD: 3 tablets  Proscar 5 mg oral tablet: 1 tab(s) orally once a day  semaglutide 0.25 mg/0.5 mL (0.25 mg dose) subcutaneous solution: 0.25 milligram(s) subcutaneously once a week  valsartan 160 mg oral capsule: orally once a day (at bedtime)

## 2024-05-01 NOTE — DISCHARGE NOTE PROVIDER - NSDCFUADDINST_GEN_ALL_CORE_FT
- Take pain medication (Tylenol/Morphine),  can take oxycodone for severe breakthrough pain as needed   - Do not submerge dressing/ incision site   - Please restart PLAVIX today as per Dr. Hahn   - Follow up with Dr. Hahn as scheduled for follow up appointment   - Please call ENT office and/or go to ED if you have any fever >101.4, increased facial swelling, persistent pain   - Take pain medication (Tylenol/Morphine),  can take oxycodone for severe breakthrough pain as needed   - Do not submerge dressing/ incision site   - Please restart PLAVIX today as per Dr. Hahn   - Follow up with Dr. Hahn as scheduled on 5/7/24 at 9am for follow up appointment   - Please call ENT office and/or go to ED if you have any fever >101.4, increased facial swelling, persistent pain

## 2024-05-01 NOTE — DISCHARGE NOTE PROVIDER - HOSPITAL COURSE
Pt is a 73y M with PMH with BPH, CAD, HTN, HLD, DM, TIA, Overactive bladder, ADAIR presented for elective R hypoglossal nerve implant (INSPIRE) with Dr. Hahn. Patient remained hemodynamically stable in RR. He was admitted for overnight observation. Patient had retention requiring CIC with 1L output. Patient reports he was able to void afterward without issue. Patient remained stable POD#1. Patient will be able to resume Plavix on POD#1. Patient is stable for discharge on 5/1/2024. Pt is a 73y M with PMH with BPH, CAD, HTN, HLD, DM, TIA, Overactive bladder, ADAIR presented for elective R hypoglossal nerve implant (INSPIRE) with Dr. Hahn. Patient remained hemodynamically stable in RR. He was admitted for overnight observation. Patient was in retention post operation requiring CIC once with 1L output. Patient reports he was able to void afterwards without issue. Patient remained stable POD#1. Patient will be able to resume Plavix on POD#1. Patient is stable for discharge on 5/1/2024.

## 2024-05-01 NOTE — PROGRESS NOTE ADULT - SUBJECTIVE AND OBJECTIVE BOX
ENT DAILY PROGRESS NOTE    Pt is a 73y M POD#1 s/p R hypoglossal nerve implant (INSPIRE). Patient seen and examined at bedside. Patient c/o mild neck discomfort. Patient reports he was able to void after CIC yesterday. Denies any fever, chills, N/V,  SOB/difficulty breathing, dysphagia, odynophagia, abdominal pain, dysuria. No acute overnight events.       REVIEW OF SYSTEMS   [x] A ten-point review of systems was otherwise negative except as noted.    Allergies    No Known Allergies    Intolerances        MEDICATIONS:  acetaminophen     Tablet .. 650 milliGRAM(s) Oral every 6 hours PRN  atorvastatin 40 milliGRAM(s) Oral at bedtime  finasteride 5 milliGRAM(s) Oral daily  HYDROmorphone  Injectable 0.25 milliGRAM(s) IV Push every 10 minutes PRN  labetalol Injectable 10 milliGRAM(s) IV Push every 10 minutes PRN  lactated ringers. 1000 milliLiter(s) IV Continuous <Continuous>  levETIRAcetam 750 milliGRAM(s) Oral two times a day  lisinopril 20 milliGRAM(s) Oral daily  melatonin 3 milliGRAM(s) Oral at bedtime  metFORMIN 1000 milliGRAM(s) Oral daily  metoprolol tartrate 25 milliGRAM(s) Oral two times a day  ondansetron Injectable 4 milliGRAM(s) IV Push once PRN  OXcarbazepine 300 milliGRAM(s) Oral two times a day  oxyCODONE    IR 5 milliGRAM(s) Oral once PRN  valsartan 160 milliGRAM(s) Oral daily      Vital Signs Last 24 Hrs  T(C): 36.4 (01 May 2024 04:21), Max: 36.8 (30 Apr 2024 20:49)  T(F): 97.5 (01 May 2024 04:21), Max: 98.3 (30 Apr 2024 20:49)  HR: 75 (01 May 2024 04:21) (62 - 103)  BP: 104/62 (01 May 2024 04:21) (104/62 - 199/111)  BP(mean): --  RR: 18 (01 May 2024 04:21) (12 - 24)  SpO2: 97% (01 May 2024 04:21) (96% - 100%)    Parameters below as of 30 Apr 2024 18:51  Patient On (Oxygen Delivery Method): room air          04-30 @ 07:01  -  05-01 @ 07:00  --------------------------------------------------------  IN:    Lactated Ringers: 475 mL  Total IN: 475 mL    OUT:    Voided (mL): 1525 mL  Total OUT: 1525 mL    Total NET: -1050 mL          PHYSICAL EXAM:    GEN:  NAD, awake and alert. No drooling or pooling of secretions. No stridor or stertor. Good vocal quality, no hoarseness.   SKIN: Good color, non diaphoretic  HEENT: NC/AT; Oral mucosa pink and moist. No erythema or edema noted to buccal mucosa, tongue, FOM, uvula or posterior oropharynx. Uvula midline  NECK:  +RIGHT neck dressing - c/d/i. +inferior border ecchymosis. Trachea midline. Neck supple, no TTP to B/L lateral neck, no cervical LAD.  RESP: No dyspnea, non-labored breathing. No use of accessory muscles.  CARDIO: +S1/S2  ABDO: Soft, NT.  EXT: CAST x 4    LABS:  CBC-    BMP/CMP-        Coagulation Studies-    Endocrine Panel-              RADIOLOGY & ADDITIONAL STUDIES:  < from: Xray Neck Soft Tissue (04.30.24 @ 13:34) >  ACC: [id number] EXAM:  XR NECK SOFT TISSUE   ORDERED BY: AMBROSE PHILIP     PROCEDURE DATE:  04/30/2024          INTERPRETATION:  Clinical History / Reason for exam: Post Inspire   hypoglossal nerve implant.    < from: Xray Chest 1 View AP/PA (04.30.24 @ 13:35) >  ACC: [id number] EXAM:  XR CHEST 1 VIEW   ORDERED BY: AMBROSE PHILIP     PROCEDURE DATE:  04/30/2024          INTERPRETATION:  Reason for study : Post INSPIRE (right hypoglossal nerve   implant) device  Technique:  Frontal view the chest      Comparison: Chest x-ray11/9/2022    Findings/  impression:    Monitoring devices overlie lung fields, obscuring underlying anatomy.    Support devices: Battery pack/wire from right hypoglossal nerve implant   overlies right chest    Cardiac/ Mediastinum: unremarkable    Lungs/ Pleura: No consolidation, pleural effusions or pneumothorax.    Skeletal/ soft tissues: Stable      --- End of Report ---    JERICA LEBLANC MD; Attending Radiologist  This document has been electronically signed. Apr 30 2024  3:29PM    < end of copied text >  2 views of the soft tissue neck.    COMPARISON: None    Findings/  impression:    Post Inspire implant, with lead traversing the right side of the neck   superiorly terminating at the base of the tongue, with postsurgical   changes including soft tissue air at the right side of the neck. The   prevertebral soft tissues are not thickened. Degenerative changes of the   cervical spine are noted.    --- End of Report ---    SHREYAS BOOTHE MD; Attending Radiologist  This document has been electronically signed. Apr 30 2024  5:38PM    < end of copied text >

## 2024-05-01 NOTE — DISCHARGE NOTE PROVIDER - PROVIDER TOKENS
PROVIDER:[TOKEN:[[id number]],ESTABLISHEDPATIENT:[T]] PROVIDER:[TOKEN:[[id number]],SCHEDULEDAPPT:[05/07/2024],SCHEDULEDAPPTTIME:[09:00 AM],ESTABLISHEDPATIENT:[T]]

## 2024-05-01 NOTE — DISCHARGE NOTE PROVIDER - NSDCFUADDAPPT_GEN_ALL_CORE_FT
Pt is a 73y M with PMH with BPH, CAD, HTN, HLD, DM, TIA, Overactive bladder, ADAIR presented for elective R hypoglossal nerve implant (INSPIRE) with Dr. Hahn. Patient remained hemodynamically stable in RR. He was admitted for overnight observation. Patient was in retention post operation requiring CIC once with 1L output. Patient reports he was able to void afterwards without any issue. Patient remained stable POD#1. Patient will be able to resume Plavix on POD#1. Patient is stable for discharge on 5/1/2024.

## 2024-05-01 NOTE — PROGRESS NOTE ADULT - ASSESSMENT
Pt is a 73y M POD#1 s/p R hypoglossal nerve implant (INSPIRE). Patient clinically doing well.     Plan:   - Will plan to restart Plavix today  - Advance diet as tolerated   - Home meds restarted   - Will d/w attending

## 2024-05-01 NOTE — DISCHARGE NOTE PROVIDER - CARE PROVIDER_API CALL
David Hahn  Head/Neck Surgery  [address] [place] [id number]  Phone: [phone]  Fax: [phone]  Established Patient  Follow Up Time:    David Hahn  Head/Neck Surgery  [address] [place] [id number]  Phone: [phone]  Fax: [phone]  Established Patient  Scheduled Appointment: 05/07/2024 09:00 AM

## 2024-05-01 NOTE — DISCHARGE NOTE PROVIDER - NSDCCPTREATMENT_GEN_ALL_CORE_FT
PRINCIPAL PROCEDURE  Procedure: Implantation of hypoglossal nerve stimulator  Findings and Treatment: RIGHT

## 2024-05-01 NOTE — DISCHARGE NOTE NURSING/CASE MANAGEMENT/SOCIAL WORK - PATIENT PORTAL LINK FT
You can access the FollowMyHealth Patient Portal offered by [facility] by registering at the following website: [email or web address]. By joining [facility]’s FollowMyHealth portal, you will also be able to view your health information using other applications (apps) compatible with our system.

## 2024-05-01 NOTE — DISCHARGE NOTE PROVIDER - NSDCFUSCHEDAPPT_GEN_ALL_CORE_FT
Ira Sinha  [facility] Physician [facility]  UROLOGY [address] [facility]  Scheduled Appointment: 05/08/2024

## 2024-05-01 NOTE — DISCHARGE NOTE PROVIDER - DISCHARGE SERVICE FOR PATIENT
98.4 on the discharge service for the patient. I have reviewed and made amendments to the documentation where necessary.

## 2024-05-07 ENCOUNTER — APPOINTMENT (OUTPATIENT)
Dept: OTOLARYNGOLOGY | Facility: CLINIC | Age: 74
End: 2024-05-07
Payer: MEDICARE

## 2024-05-07 PROCEDURE — 99024 POSTOP FOLLOW-UP VISIT: CPT

## 2024-05-07 NOTE — HISTORY OF PRESENT ILLNESS
[FreeTextEntry1] : Patient returns today s/p hypoglossal nerve implant 4/30/24. States that he is experiencing some pain at site of surgery. Otherwise doing well.

## 2024-05-07 NOTE — PHYSICAL EXAM
[Normal] : temporomandibular joint is normal [de-identified] : Mild ecchymosis neck and chest. Incisions intact, flat and soft, no hematoma

## 2024-05-08 ENCOUNTER — APPOINTMENT (OUTPATIENT)
Dept: UROLOGY | Facility: CLINIC | Age: 74
End: 2024-05-08

## 2024-05-13 DIAGNOSIS — Z79.02 LONG TERM (CURRENT) USE OF ANTITHROMBOTICS/ANTIPLATELETS: ICD-10-CM

## 2024-05-13 DIAGNOSIS — Z86.73 PERSONAL HISTORY OF TRANSIENT ISCHEMIC ATTACK (TIA), AND CEREBRAL INFARCTION WITHOUT RESIDUAL DEFICITS: ICD-10-CM

## 2024-05-13 DIAGNOSIS — Z91.199 PATIENT'S NONCOMPLIANCE WITH OTHER MEDICAL TREATMENT AND REGIMEN DUE TO UNSPECIFIED REASON: ICD-10-CM

## 2024-05-13 DIAGNOSIS — Z79.84 LONG TERM (CURRENT) USE OF ORAL HYPOGLYCEMIC DRUGS: ICD-10-CM

## 2024-05-13 DIAGNOSIS — R33.8 OTHER RETENTION OF URINE: ICD-10-CM
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
## 2024-05-13 DIAGNOSIS — I10 ESSENTIAL (PRIMARY) HYPERTENSION: ICD-10-CM

## 2024-05-13 DIAGNOSIS — F32.A DEPRESSION, UNSPECIFIED: ICD-10-CM

## 2024-05-13 DIAGNOSIS — I25.10 ATHEROSCLEROTIC HEART DISEASE OF NATIVE CORONARY ARTERY WITHOUT ANGINA PECTORIS: ICD-10-CM

## 2024-05-13 DIAGNOSIS — E11.40 TYPE 2 DIABETES MELLITUS WITH DIABETIC NEUROPATHY, UNSPECIFIED: ICD-10-CM

## 2024-05-13 DIAGNOSIS — Z79.85 LONG-TERM (CURRENT) USE OF INJECTABLE NON-INSULIN ANTIDIABETIC DRUGS: ICD-10-CM

## 2024-05-13 DIAGNOSIS — I97.42 INTRAOPERATIVE HEMORRHAGE AND HEMATOMA OF A CIRCULATORY SYSTEM ORGAN OR STRUCTURE COMPLICATING OTHER PROCEDURE: ICD-10-CM

## 2024-05-13 DIAGNOSIS — F41.9 ANXIETY DISORDER, UNSPECIFIED: ICD-10-CM

## 2024-05-13 DIAGNOSIS — R35.0 FREQUENCY OF MICTURITION: ICD-10-CM

## 2024-05-13 DIAGNOSIS — N40.1 BENIGN PROSTATIC HYPERPLASIA WITH LOWER URINARY TRACT SYMPTOMS: ICD-10-CM

## 2024-05-13 DIAGNOSIS — E78.00 PURE HYPERCHOLESTEROLEMIA, UNSPECIFIED: ICD-10-CM

## 2024-05-13 DIAGNOSIS — G47.33 OBSTRUCTIVE SLEEP APNEA (ADULT) (PEDIATRIC): ICD-10-CM

## 2024-05-22 ENCOUNTER — APPOINTMENT (OUTPATIENT)
Dept: UROLOGY | Facility: CLINIC | Age: 74
End: 2024-05-22
Payer: MEDICARE

## 2024-05-22 PROCEDURE — 64566 NEUROELTRD STIM POST TIBIAL: CPT

## 2024-05-28 ENCOUNTER — APPOINTMENT (OUTPATIENT)
Dept: OTOLARYNGOLOGY | Facility: CLINIC | Age: 74
End: 2024-05-28
Payer: MEDICARE

## 2024-05-28 VITALS — HEIGHT: 66 IN | BODY MASS INDEX: 43.71 KG/M2 | WEIGHT: 272 LBS

## 2024-05-28 DIAGNOSIS — Z96.82 PRESENCE OF NEUROSTIMULATOR: ICD-10-CM

## 2024-05-28 DIAGNOSIS — G47.33 OBSTRUCTIVE SLEEP APNEA (ADULT) (PEDIATRIC): ICD-10-CM

## 2024-05-28 PROCEDURE — 99024 POSTOP FOLLOW-UP VISIT: CPT

## 2024-05-28 PROCEDURE — 95976 ALYS SMPL CN NPGT PRGRMG: CPT

## 2024-05-28 NOTE — HISTORY OF PRESENT ILLNESS
[FreeTextEntry1] : Patient here today  s/p right hypoglossal nerve implant  (inspire)  05/01/2024. Doing well without issue. Here for actrivation.

## 2024-05-28 NOTE — REASON FOR VISIT
[Post-Operative Visit] : a post-operative visit [FreeTextEntry2] : s/p right hypoglossal nerve implant  (inspire)  05/01/2024

## 2024-06-19 ENCOUNTER — APPOINTMENT (OUTPATIENT)
Dept: UROLOGY | Facility: CLINIC | Age: 74
End: 2024-06-19
Payer: MEDICARE

## 2024-06-19 VITALS
SYSTOLIC BLOOD PRESSURE: 140 MMHG | TEMPERATURE: 98 F | BODY MASS INDEX: 43.71 KG/M2 | WEIGHT: 272 LBS | HEIGHT: 66 IN | DIASTOLIC BLOOD PRESSURE: 80 MMHG

## 2024-06-19 DIAGNOSIS — R32 UNSPECIFIED URINARY INCONTINENCE: ICD-10-CM

## 2024-06-19 PROCEDURE — 64566 NEUROELTRD STIM POST TIBIAL: CPT

## 2024-07-15 ENCOUNTER — APPOINTMENT (OUTPATIENT)
Dept: OTOLARYNGOLOGY | Facility: CLINIC | Age: 74
End: 2024-07-15

## 2024-07-15 VITALS — WEIGHT: 272 LBS | HEIGHT: 66 IN | BODY MASS INDEX: 43.71 KG/M2

## 2024-07-15 DIAGNOSIS — G47.33 OBSTRUCTIVE SLEEP APNEA (ADULT) (PEDIATRIC): ICD-10-CM

## 2024-07-15 DIAGNOSIS — Z96.82 PRESENCE OF NEUROSTIMULATOR: ICD-10-CM

## 2024-07-15 DIAGNOSIS — J30.0 VASOMOTOR RHINITIS: ICD-10-CM

## 2024-07-15 DIAGNOSIS — R06.83 SNORING: ICD-10-CM

## 2024-07-15 PROCEDURE — 95976 ALYS SMPL CN NPGT PRGRMG: CPT

## 2024-07-15 PROCEDURE — 99024 POSTOP FOLLOW-UP VISIT: CPT

## 2024-07-15 RX ORDER — IPRATROPIUM BROMIDE 42 UG/1
0.06 SPRAY NASAL 3 TIMES DAILY
Qty: 1 | Refills: 6 | Status: ACTIVE | COMMUNITY
Start: 2024-07-15 | End: 1900-01-01

## 2024-07-17 ENCOUNTER — APPOINTMENT (OUTPATIENT)
Dept: UROLOGY | Facility: CLINIC | Age: 74
End: 2024-07-17
Payer: MEDICARE

## 2024-07-17 DIAGNOSIS — R32 UNSPECIFIED URINARY INCONTINENCE: ICD-10-CM

## 2024-07-17 PROCEDURE — 64566 NEUROELTRD STIM POST TIBIAL: CPT

## 2024-08-14 ENCOUNTER — APPOINTMENT (OUTPATIENT)
Dept: UROLOGY | Facility: CLINIC | Age: 74
End: 2024-08-14

## 2024-08-14 DIAGNOSIS — R32 UNSPECIFIED URINARY INCONTINENCE: ICD-10-CM

## 2024-08-14 PROCEDURE — 64566 NEUROELTRD STIM POST TIBIAL: CPT

## 2024-08-14 NOTE — H&P PST ADULT - CENTRAL VENOUS CATHETER
Writer extended TSH lab and attempted to call patient to relay information. No answer, left detailed message    no

## 2024-08-26 ENCOUNTER — APPOINTMENT (OUTPATIENT)
Dept: OTOLARYNGOLOGY | Facility: CLINIC | Age: 74
End: 2024-08-26
Payer: MEDICARE

## 2024-08-26 VITALS — HEIGHT: 66 IN | BODY MASS INDEX: 43.71 KG/M2 | WEIGHT: 272 LBS

## 2024-08-26 DIAGNOSIS — J30.0 VASOMOTOR RHINITIS: ICD-10-CM

## 2024-08-26 DIAGNOSIS — G47.33 OBSTRUCTIVE SLEEP APNEA (ADULT) (PEDIATRIC): ICD-10-CM

## 2024-08-26 DIAGNOSIS — Z96.82 PRESENCE OF NEUROSTIMULATOR: ICD-10-CM

## 2024-08-26 PROCEDURE — G2211 COMPLEX E/M VISIT ADD ON: CPT

## 2024-08-26 PROCEDURE — 99213 OFFICE O/P EST LOW 20 MIN: CPT

## 2024-08-26 PROCEDURE — 95976 ALYS SMPL CN NPGT PRGRMG: CPT

## 2024-08-26 NOTE — HISTORY OF PRESENT ILLNESS
[FreeTextEntry1] : Patient here today s/p  right hypoglossal nerve implant (inspire) 05/01/2024., obstructive sleep apnea. Patient states he is feeling good and has no complaints.

## 2024-08-26 NOTE — REASON FOR VISIT
[Subsequent Evaluation] : a subsequent evaluation for [FreeTextEntry2] :  right hypoglossal nerve implant (inspire) 05/01/2024., obstructive sleep apnea.

## 2024-09-25 ENCOUNTER — APPOINTMENT (OUTPATIENT)
Dept: UROLOGY | Facility: CLINIC | Age: 74
End: 2024-09-25

## 2024-10-07 ENCOUNTER — APPOINTMENT (OUTPATIENT)
Dept: OTOLARYNGOLOGY | Facility: CLINIC | Age: 74
End: 2024-10-07
Payer: MEDICARE

## 2024-10-07 DIAGNOSIS — G47.33 OBSTRUCTIVE SLEEP APNEA (ADULT) (PEDIATRIC): ICD-10-CM

## 2024-10-07 PROCEDURE — 99213 OFFICE O/P EST LOW 20 MIN: CPT

## 2024-10-07 PROCEDURE — 95976 ALYS SMPL CN NPGT PRGRMG: CPT

## 2024-10-07 RX ORDER — FINASTERIDE 5 MG/1
5 TABLET, FILM COATED ORAL
Refills: 0 | Status: ACTIVE | COMMUNITY

## 2024-10-07 RX ORDER — LISINOPRIL 20 MG/1
20 TABLET ORAL
Refills: 0 | Status: ACTIVE | COMMUNITY

## 2024-10-07 RX ORDER — VALSARTAN 160 MG/1
160 TABLET, COATED ORAL
Refills: 0 | Status: ACTIVE | COMMUNITY

## 2024-10-07 RX ORDER — LOTEPREDNOL ETABONATE AND TOBRAMYCIN 5; 3 MG/ML; MG/ML
0.5-0.3 SUSPENSION/ DROPS OPHTHALMIC
Refills: 0 | Status: ACTIVE | COMMUNITY

## 2024-10-07 RX ORDER — AMLODIPINE BESYLATE 5 MG/1
5 TABLET ORAL
Refills: 0 | Status: ACTIVE | COMMUNITY

## 2024-10-07 RX ORDER — ENALAPRIL MALEATE 10 MG/1
10 TABLET ORAL
Refills: 0 | Status: ACTIVE | COMMUNITY

## 2024-10-09 ENCOUNTER — APPOINTMENT (OUTPATIENT)
Dept: UROLOGY | Facility: CLINIC | Age: 74
End: 2024-10-09

## 2024-10-09 DIAGNOSIS — R32 UNSPECIFIED URINARY INCONTINENCE: ICD-10-CM

## 2024-10-09 PROCEDURE — 64566 NEUROELTRD STIM POST TIBIAL: CPT

## 2024-10-15 ENCOUNTER — APPOINTMENT (OUTPATIENT)
Dept: NEUROLOGY | Facility: CLINIC | Age: 74
End: 2024-10-15
Payer: MEDICARE

## 2024-10-15 DIAGNOSIS — G40.109 LOCALIZATION-RELATED (FOCAL) (PARTIAL) SYMPTOMATIC EPILEPSY AND EPILEPTIC SYNDROMES WITH SIMPLE PARTIAL SEIZURES, NOT INTRACTABLE, W/OUT STATUS EPILEPTICUS: ICD-10-CM

## 2024-10-15 DIAGNOSIS — Z96.82 PRESENCE OF NEUROSTIMULATOR: ICD-10-CM

## 2024-10-15 DIAGNOSIS — F39 UNSPECIFIED MOOD [AFFECTIVE] DISORDER: ICD-10-CM

## 2024-10-15 DIAGNOSIS — R26.9 UNSPECIFIED ABNORMALITIES OF GAIT AND MOBILITY: ICD-10-CM

## 2024-10-15 PROCEDURE — G2211 COMPLEX E/M VISIT ADD ON: CPT

## 2024-10-15 PROCEDURE — 99213 OFFICE O/P EST LOW 20 MIN: CPT

## 2024-12-05 ENCOUNTER — APPOINTMENT (OUTPATIENT)
Dept: SLEEP CENTER | Facility: HOSPITAL | Age: 74
End: 2024-12-05

## 2024-12-05 ENCOUNTER — OUTPATIENT (OUTPATIENT)
Dept: OUTPATIENT SERVICES | Facility: HOSPITAL | Age: 74
LOS: 1 days | Discharge: HOME | End: 2024-12-05
Payer: MEDICARE

## 2024-12-05 DIAGNOSIS — H26.9 UNSPECIFIED CATARACT: Chronic | ICD-10-CM

## 2024-12-05 DIAGNOSIS — Z98.890 OTHER SPECIFIED POSTPROCEDURAL STATES: Chronic | ICD-10-CM

## 2024-12-05 DIAGNOSIS — Z96.1 PRESENCE OF INTRAOCULAR LENS: Chronic | ICD-10-CM

## 2024-12-05 DIAGNOSIS — G47.33 OBSTRUCTIVE SLEEP APNEA (ADULT) (PEDIATRIC): ICD-10-CM

## 2024-12-05 PROCEDURE — 95810 POLYSOM 6/> YRS 4/> PARAM: CPT

## 2024-12-05 PROCEDURE — 95977 ALYS CPLX CN NPGT PRGRMG: CPT

## 2024-12-07 DIAGNOSIS — G47.33 OBSTRUCTIVE SLEEP APNEA (ADULT) (PEDIATRIC): ICD-10-CM

## 2025-04-09 ENCOUNTER — LABORATORY RESULT (OUTPATIENT)
Age: 75
End: 2025-04-09

## 2025-04-09 ENCOUNTER — APPOINTMENT (OUTPATIENT)
Dept: UROLOGY | Facility: CLINIC | Age: 75
End: 2025-04-09
Payer: MEDICARE

## 2025-04-09 DIAGNOSIS — R32 UNSPECIFIED URINARY INCONTINENCE: ICD-10-CM

## 2025-04-09 LAB
BILIRUB UR QL STRIP: NORMAL
COLLECTION METHOD: NORMAL
GLUCOSE UR-MCNC: NORMAL
HCG UR QL: NORMAL EU/DL
HGB UR QL STRIP.AUTO: NORMAL
KETONES UR-MCNC: NORMAL
LEUKOCYTE ESTERASE UR QL STRIP: NORMAL
NITRITE UR QL STRIP: NORMAL
PH UR STRIP: 6
PROT UR STRIP-MCNC: NORMAL
SP GR UR STRIP: 1.02

## 2025-04-09 PROCEDURE — 64566 NEUROELTRD STIM POST TIBIAL: CPT

## 2025-04-15 DIAGNOSIS — A49.9 URINARY TRACT INFECTION, SITE NOT SPECIFIED: ICD-10-CM

## 2025-04-15 DIAGNOSIS — N39.0 URINARY TRACT INFECTION, SITE NOT SPECIFIED: ICD-10-CM

## 2025-04-15 RX ORDER — AMOXICILLIN AND CLAVULANATE POTASSIUM 500; 125 MG/1; MG/1
500-125 TABLET, FILM COATED ORAL
Qty: 14 | Refills: 0 | Status: ACTIVE | COMMUNITY
Start: 2025-04-15 | End: 1900-01-01

## 2025-04-17 ENCOUNTER — NON-APPOINTMENT (OUTPATIENT)
Age: 75
End: 2025-04-17

## 2025-04-17 ENCOUNTER — APPOINTMENT (OUTPATIENT)
Dept: NEUROLOGY | Facility: CLINIC | Age: 75
End: 2025-04-17
Payer: MEDICARE

## 2025-04-17 VITALS
BODY MASS INDEX: 37.61 KG/M2 | HEART RATE: 70 BPM | HEIGHT: 66 IN | SYSTOLIC BLOOD PRESSURE: 156 MMHG | RESPIRATION RATE: 19 BRPM | OXYGEN SATURATION: 99 % | DIASTOLIC BLOOD PRESSURE: 82 MMHG | WEIGHT: 234 LBS

## 2025-04-17 DIAGNOSIS — G40.109 LOCALIZATION-RELATED (FOCAL) (PARTIAL) SYMPTOMATIC EPILEPSY AND EPILEPTIC SYNDROMES WITH SIMPLE PARTIAL SEIZURES, NOT INTRACTABLE, W/OUT STATUS EPILEPTICUS: ICD-10-CM

## 2025-04-17 PROCEDURE — 99213 OFFICE O/P EST LOW 20 MIN: CPT

## 2025-04-17 RX ORDER — SEMAGLUTIDE 2.68 MG/ML
8 INJECTION, SOLUTION SUBCUTANEOUS
Refills: 0 | Status: ACTIVE | COMMUNITY

## 2025-05-01 ENCOUNTER — APPOINTMENT (OUTPATIENT)
Dept: OTOLARYNGOLOGY | Facility: CLINIC | Age: 75
End: 2025-05-01
Payer: MEDICARE

## 2025-05-01 VITALS — BODY MASS INDEX: 37.61 KG/M2 | WEIGHT: 234 LBS | HEIGHT: 66 IN

## 2025-05-01 DIAGNOSIS — Z96.82 PRESENCE OF NEUROSTIMULATOR: ICD-10-CM

## 2025-05-01 DIAGNOSIS — J30.0 VASOMOTOR RHINITIS: ICD-10-CM

## 2025-05-01 DIAGNOSIS — G47.33 OBSTRUCTIVE SLEEP APNEA (ADULT) (PEDIATRIC): ICD-10-CM

## 2025-05-01 DIAGNOSIS — K11.7 DISTURBANCES OF SALIVARY SECRETION: ICD-10-CM

## 2025-05-01 PROCEDURE — 31575 DIAGNOSTIC LARYNGOSCOPY: CPT

## 2025-05-01 PROCEDURE — 99214 OFFICE O/P EST MOD 30 MIN: CPT | Mod: 25

## 2025-05-01 RX ORDER — FAMOTIDINE 40 MG/1
40 TABLET, FILM COATED ORAL
Qty: 30 | Refills: 6 | Status: ACTIVE | COMMUNITY
Start: 2025-05-01 | End: 1900-01-01

## 2025-05-28 ENCOUNTER — APPOINTMENT (OUTPATIENT)
Dept: UROLOGY | Facility: CLINIC | Age: 75
End: 2025-05-28
Payer: MEDICARE

## 2025-05-28 DIAGNOSIS — N32.81 OVERACTIVE BLADDER: ICD-10-CM

## 2025-05-28 PROCEDURE — 64566 NEUROELTRD STIM POST TIBIAL: CPT

## 2025-06-06 ENCOUNTER — RESULT REVIEW (OUTPATIENT)
Age: 75
End: 2025-06-06

## 2025-06-06 ENCOUNTER — OUTPATIENT (OUTPATIENT)
Dept: OUTPATIENT SERVICES | Facility: HOSPITAL | Age: 75
LOS: 1 days | End: 2025-06-06
Payer: MEDICARE

## 2025-06-06 DIAGNOSIS — Z96.1 PRESENCE OF INTRAOCULAR LENS: Chronic | ICD-10-CM

## 2025-06-06 DIAGNOSIS — K11.7 DISTURBANCES OF SALIVARY SECRETION: ICD-10-CM

## 2025-06-06 DIAGNOSIS — Z98.890 OTHER SPECIFIED POSTPROCEDURAL STATES: Chronic | ICD-10-CM

## 2025-06-06 DIAGNOSIS — H26.9 UNSPECIFIED CATARACT: Chronic | ICD-10-CM

## 2025-06-06 DIAGNOSIS — Z00.8 ENCOUNTER FOR OTHER GENERAL EXAMINATION: ICD-10-CM

## 2025-06-06 PROCEDURE — 74220 X-RAY XM ESOPHAGUS 1CNTRST: CPT | Mod: 26

## 2025-06-06 PROCEDURE — 74220 X-RAY XM ESOPHAGUS 1CNTRST: CPT

## 2025-06-07 DIAGNOSIS — K11.7 DISTURBANCES OF SALIVARY SECRETION: ICD-10-CM

## 2025-06-12 ENCOUNTER — APPOINTMENT (OUTPATIENT)
Dept: OTOLARYNGOLOGY | Facility: CLINIC | Age: 75
End: 2025-06-12
Payer: MEDICARE

## 2025-06-12 PROBLEM — R13.10 DYSPHAGIA, UNSPECIFIED TYPE: Status: ACTIVE | Noted: 2025-06-12

## 2025-06-12 PROBLEM — K21.9 CHRONIC GERD: Status: ACTIVE | Noted: 2025-06-12

## 2025-06-12 PROBLEM — K22.4 ESOPHAGEAL DYSMOTILITY: Status: ACTIVE | Noted: 2025-06-12

## 2025-06-12 PROCEDURE — 99214 OFFICE O/P EST MOD 30 MIN: CPT | Mod: 25

## 2025-06-12 PROCEDURE — 31575 DIAGNOSTIC LARYNGOSCOPY: CPT

## 2025-06-24 NOTE — H&P PST ADULT - GASTROINTESTINAL
Wash hands before and after wound care   Clean with vashe or  baby shampoo and water  Apply aquacel ag advantage to wound.  Cover and secure with 4x4s, kerlix, and paper tape  Change daily and as needed for drainage or soilage.  Keep leg elevated and avoid pressure on wound.       Monitor closely for s/s of infection including fever, chills, increase in pain, odor from wound, and increased redness from foot. Go to ER if any complications develop.   Keep leg elevated and avoid pressure on wound.     normal/soft/nontender/nondistended/normal active bowel sounds

## 2025-07-02 ENCOUNTER — APPOINTMENT (OUTPATIENT)
Dept: UROLOGY | Facility: CLINIC | Age: 75
End: 2025-07-02
Payer: MEDICARE

## 2025-07-02 PROBLEM — R39.9 URINARY SYMPTOM OR SIGN: Status: ACTIVE | Noted: 2025-07-02

## 2025-07-02 PROCEDURE — 99214 OFFICE O/P EST MOD 30 MIN: CPT | Mod: 25

## 2025-07-02 PROCEDURE — 64566 NEUROELTRD STIM POST TIBIAL: CPT

## 2025-07-02 RX ORDER — VIBEGRON 75 MG/1
75 TABLET, FILM COATED ORAL
Qty: 90 | Refills: 3 | Status: ACTIVE | COMMUNITY
Start: 2025-07-02 | End: 1900-01-01

## 2025-07-29 NOTE — ASU PATIENT PROFILE, ADULT - AS [facility]
Goal Outcome Evaluation:  Plan of Care Reviewed With: patient           Outcome Evaluation: Pt presents with weakness, impaired balance, and difficulty walking and is below baseline level of function for mobility. Pt amb with RW and Agus x2 due to unsteadiness. Pt will benefit from IPPT services to address limitations. PT rec d/c IRF.    Anticipated Discharge Disposition (PT): inpatient rehabilitation facility                         (4) no impairment

## 2025-07-30 ENCOUNTER — APPOINTMENT (OUTPATIENT)
Dept: UROLOGY | Facility: CLINIC | Age: 75
End: 2025-07-30
Payer: MEDICARE

## 2025-07-30 DIAGNOSIS — N32.81 OVERACTIVE BLADDER: ICD-10-CM

## 2025-07-30 PROCEDURE — 64566 NEUROELTRD STIM POST TIBIAL: CPT

## 2025-08-27 ENCOUNTER — APPOINTMENT (OUTPATIENT)
Dept: UROLOGY | Facility: CLINIC | Age: 75
End: 2025-08-27
Payer: MEDICARE

## 2025-08-27 DIAGNOSIS — E11.59 TYPE 2 DIABETES MELLITUS WITH OTHER CIRCULATORY COMPLICATIONS: ICD-10-CM

## 2025-08-27 DIAGNOSIS — R32 UNSPECIFIED URINARY INCONTINENCE: ICD-10-CM

## 2025-08-27 DIAGNOSIS — R39.15 URGENCY OF URINATION: ICD-10-CM

## 2025-08-27 DIAGNOSIS — N32.81 OVERACTIVE BLADDER: ICD-10-CM

## 2025-08-27 DIAGNOSIS — R35.0 FREQUENCY OF MICTURITION: ICD-10-CM

## 2025-08-27 PROCEDURE — 64566 NEUROELTRD STIM POST TIBIAL: CPT

## 2025-09-05 ENCOUNTER — APPOINTMENT (OUTPATIENT)
Dept: GASTROENTEROLOGY | Facility: CLINIC | Age: 75
End: 2025-09-05